# Patient Record
Sex: FEMALE | Race: WHITE | NOT HISPANIC OR LATINO | Employment: OTHER | URBAN - METROPOLITAN AREA
[De-identification: names, ages, dates, MRNs, and addresses within clinical notes are randomized per-mention and may not be internally consistent; named-entity substitution may affect disease eponyms.]

---

## 2017-02-14 ENCOUNTER — HISTORICAL (OUTPATIENT)
Dept: RADIOLOGY | Facility: HOSPITAL | Age: 62
End: 2017-02-14

## 2017-05-04 ENCOUNTER — HISTORICAL (OUTPATIENT)
Dept: HEMATOLOGY/ONCOLOGY | Facility: CLINIC | Age: 62
End: 2017-05-04

## 2017-07-24 ENCOUNTER — HISTORICAL (OUTPATIENT)
Dept: HEMATOLOGY/ONCOLOGY | Facility: CLINIC | Age: 62
End: 2017-07-24

## 2017-10-25 ENCOUNTER — HISTORICAL (OUTPATIENT)
Dept: HEMATOLOGY/ONCOLOGY | Facility: CLINIC | Age: 62
End: 2017-10-25

## 2017-11-03 ENCOUNTER — HISTORICAL (OUTPATIENT)
Dept: RADIOLOGY | Facility: HOSPITAL | Age: 62
End: 2017-11-03

## 2017-11-08 ENCOUNTER — HISTORICAL (OUTPATIENT)
Dept: LAB | Facility: HOSPITAL | Age: 62
End: 2017-11-08

## 2017-12-13 ENCOUNTER — HISTORICAL (OUTPATIENT)
Dept: ADMINISTRATIVE | Facility: HOSPITAL | Age: 62
End: 2017-12-13

## 2018-02-23 ENCOUNTER — HISTORICAL (OUTPATIENT)
Dept: HEMATOLOGY/ONCOLOGY | Facility: CLINIC | Age: 63
End: 2018-02-23

## 2018-05-25 ENCOUNTER — HISTORICAL (OUTPATIENT)
Dept: HEMATOLOGY/ONCOLOGY | Facility: CLINIC | Age: 63
End: 2018-05-25

## 2018-05-29 ENCOUNTER — HISTORICAL (OUTPATIENT)
Dept: HEMATOLOGY/ONCOLOGY | Facility: CLINIC | Age: 63
End: 2018-05-29

## 2018-06-05 ENCOUNTER — HISTORICAL (OUTPATIENT)
Dept: CARDIOLOGY | Facility: HOSPITAL | Age: 63
End: 2018-06-05

## 2018-06-05 ENCOUNTER — TELEPHONE (OUTPATIENT)
Dept: TRANSPLANT | Facility: CLINIC | Age: 63
End: 2018-06-05

## 2018-06-05 NOTE — TELEPHONE ENCOUNTER
----- Message from Greyson Hale sent at 6/5/2018  8:57 AM CDT -----  Rec'brian recs on pt, but there were no pt demos. Called ref MD office, Dr. DOMONIQUE Schneider @ [phone] and was transferred to HIM were I LVM for them to send pt demo sheet.

## 2018-06-11 ENCOUNTER — TELEPHONE (OUTPATIENT)
Dept: TRANSPLANT | Facility: CLINIC | Age: 63
End: 2018-06-11

## 2018-06-11 NOTE — TELEPHONE ENCOUNTER
----- Message from Sharron Trivedi sent at 6/11/2018 10:16 AM CDT -----  Contact: Dr. DOMONIQUE Schneider Office  Needs Advice    Reason for call:   Status check on Referral  Communication Preference: [phone]  Additional Information: n/a

## 2018-06-12 ENCOUNTER — TELEPHONE (OUTPATIENT)
Dept: TRANSPLANT | Facility: CLINIC | Age: 63
End: 2018-06-12

## 2018-06-12 ENCOUNTER — HISTORICAL (OUTPATIENT)
Dept: CARDIOLOGY | Facility: HOSPITAL | Age: 63
End: 2018-06-12

## 2018-06-12 NOTE — TELEPHONE ENCOUNTER
Pt called re referral rec. elhamm to call back. Call to mert Wakefield to return call. Dr Lujan placed a call to MERT Grover on his phone per DR Lujan. PT chart reviewed, Per Dr Lujan she does not meet criteria for liver transplant.

## 2018-06-12 NOTE — TELEPHONE ENCOUNTER
Initial referral received from Dr Heriberto Schneider    Patient with multifocal HCC   Referred for  SURGICAL CONSULT   Referral completed and forwarded to [facility].      Insurance: [employer]  Contact # [phone]  Policy [id number]  [facility] # [id number]

## 2018-06-12 NOTE — TELEPHONE ENCOUNTER
Call placed to AMELIA Sneed again regarding pt referral. Requested a call back.  Also called pt home twice, lvm on phone number listed.

## 2018-06-12 NOTE — TELEPHONE ENCOUNTER
Called spoke to the , agreed to appt on 6/20 but maybe sooner if wife would want.  She is currently being Mapped for a Tace procedure. He will call back. AMELIA Ramos requested the CD's of all imaging and the  will pick it up. As of now, the appt is made for 6/20 .

## 2018-06-12 NOTE — TELEPHONE ENCOUNTER
Placed call to patient no answer, left voice message.  Patient is scheduled to see Dr. Perkins on 06/20/18 at 2:00 pm.

## 2018-06-19 ENCOUNTER — TELEPHONE (OUTPATIENT)
Dept: TRANSPLANT | Facility: CLINIC | Age: 63
End: 2018-06-19

## 2018-06-19 ENCOUNTER — HISTORICAL (OUTPATIENT)
Dept: CARDIOLOGY | Facility: HOSPITAL | Age: 63
End: 2018-06-19

## 2018-06-19 RX ORDER — MULTIVITAMIN
1 TABLET ORAL DAILY
COMMUNITY

## 2018-06-19 RX ORDER — BIOTIN 1 MG
5000 TABLET ORAL DAILY
COMMUNITY

## 2018-06-19 RX ORDER — AMOXICILLIN 500 MG
2 CAPSULE ORAL DAILY
COMMUNITY
End: 2023-03-01

## 2018-06-19 NOTE — TELEPHONE ENCOUNTER
Pt called to confirm her appt for tomorrow, lvm on mobile [phone] phone. Call place to patients home number [phone] LV. Call placed to phone numer [phone] LV with Lev Lewis.      Called to speak with Lorenzo CISNEROS with DR Schneider's office. LVM to call if she has any updates.

## 2018-06-20 ENCOUNTER — EVALUATION (OUTPATIENT)
Dept: TRANSPLANT | Facility: CLINIC | Age: 63
End: 2018-06-20
Payer: COMMERCIAL

## 2018-06-20 ENCOUNTER — TELEPHONE (OUTPATIENT)
Dept: TRANSPLANT | Facility: CLINIC | Age: 63
End: 2018-06-20

## 2018-06-20 VITALS
DIASTOLIC BLOOD PRESSURE: 78 MMHG | HEIGHT: 66 IN | HEART RATE: 75 BPM | OXYGEN SATURATION: 96 % | TEMPERATURE: 99 F | SYSTOLIC BLOOD PRESSURE: 108 MMHG | WEIGHT: 143.06 LBS | BODY MASS INDEX: 22.99 KG/M2 | RESPIRATION RATE: 18 BRPM

## 2018-06-20 DIAGNOSIS — C22.0 HCC (HEPATOCELLULAR CARCINOMA): ICD-10-CM

## 2018-06-20 PROCEDURE — 3008F BODY MASS INDEX DOCD: CPT | Mod: CPTII,S$GLB,, | Performed by: TRANSPLANT SURGERY

## 2018-06-20 PROCEDURE — 99999 PR PBB SHADOW E&M-EST. PATIENT-LVL III: CPT | Mod: PBBFAC,TXP,,

## 2018-06-20 PROCEDURE — 99204 OFFICE O/P NEW MOD 45 MIN: CPT | Mod: S$GLB,,, | Performed by: TRANSPLANT SURGERY

## 2018-06-20 NOTE — TELEPHONE ENCOUNTER
Patient: Maci Lewis       MRN: [id number]      : 1955     Age: 62 y.o.  [address] [place]  [place] [address]    Provider: Gigi Perkins MD    Urgency of review: urgent    Patient Transplant Status: Not a candidate    Reason for presentation: Non-Transplant    Clinical Summary: 62 F with recurrent, multifocal HCC. No history of liver disease, prior breast cancer in 2012 treated, no recurrence. Initially presented in 2016 with tumor rupture and bleed, non-anatomic liver resection. She was disease free for over a year then developed a recurrent at the resection margin requiring a second, apparent non-anatomic liver resection at that time. On her second post op imaging, she had 4 tumors, biopsy proven HCC. No evidence of extrahepatic disease, macrovascular invasion or significant lymphadenopathy. Got first treatment of Y90 in [place] of right lobe.    Imaging to be reviewed: CT    HCC Treatment History: Resection x 2 and Y90    ABO:     Platelets: No results found for: PLT, EXTPLATELETS  Creatinine: No results found for: CREATININE, EXTCREATININ  Bilirubin: No results found for: BILITOT, EXTBILITOTAL, EXTBILIRUBIN  AFP Last 3 each if available: No results found for: AFP, EXTAFP    MELD: Computed MELD-Na score unavailable. Necessary lab results were not found in the last year.  Computed MELD score unavailable. Necessary lab results were not found in the last year.    Plan:     Follow-up Provider:

## 2018-06-20 NOTE — PROGRESS NOTES
Subjective:       Patient ID: Maci Lewis is a 62 y.o. female.    Chief Complaint: Hepatic Cancer (HCC)    62 F referred for consultation regarding multi-focal recurrent HCC. She initally presented in 2016 with acute intra-abdominal bleed from peripheral liver mass that required non-anatomic liver resection. Pathology confirmed moderately differentiated HCC by report. She recovered well and was disease free for over a year, but then developed a recurrence at the resection margin treated with a second liver resection. Based on review of imaging this appears to be a non-anatomic resection (op report not available). On her second follow up imaging she unfortunately had multifocal disease with three masses in the right lobe and a fourth mass in the left lobe. The dominant lesion was biopsy proven HCC. She recently had Y90 radioembolization of the right lobe. Most recent imaging is negative for extrahepatic disease, macrovascular invasion or extra-hepatic disease. She otherwise feels well and is very healthy. Her past medical history is significant for breast cancer in 2013 treated with bilateral mastectomy and adjuvant chemotherapy. She has remained disease free for nearly 5 years with respect to her breast cancer.       Review of Systems   Constitutional: Negative for activity change and chills.   HENT: Negative for congestion and sore throat.    Eyes: Negative for discharge and redness.   Respiratory: Negative for cough and shortness of breath.    Cardiovascular: Negative for chest pain and palpitations.   Gastrointestinal: Negative for nausea and vomiting.   Endocrine: Negative for polydipsia and polyuria.   Genitourinary: Negative for dysuria and frequency.   Musculoskeletal: Negative for arthralgias and gait problem.   Skin: Negative for pallor and rash.   Neurological: Negative for dizziness and light-headedness.   Hematological: Negative for adenopathy. Does not bruise/bleed easily.   Psychiatric/Behavioral:  Negative for agitation and suicidal ideas.       Objective:      Physical Exam   Constitutional: She is oriented to person, place, and time. No distress.   Neck: No JVD present.   Cardiovascular: Normal rate and regular rhythm.    Pulmonary/Chest: Effort normal. No respiratory distress. She has no wheezes.   Abdominal: Soft.   Neurological: She is alert and oriented to person, place, and time.   Skin: Skin is warm and dry. She is not diaphoretic.       Assessment:       1. HCC (hepatocellular carcinoma)        Plan:       Ms Lewis presents with multifocal recurrent HCC. We had an extensive discussion about treatment options including the potential role for liver transplantation. Based on her current disease burden, she is outside of [place] and [facility] staging criteria and is not a candidate for liver transplantation. The priority of treatment should be locoregional management with the intent to potentially downstage her tumor burden to within [place] criteria. I discussed that based on the multi-focal, bilobar distribution sustainable downstaging would be unlikely. However, there are cases from our center that have had significant response to Y90 treatment. We also discussed the possibility of living donor liver transplantation which may be a potential option in the future if we were to achieve downstaging.    I plan to present her case at tumor board and review her imaging to discuss additional treatment options. I addressed all of her questions.    Gigi Perkins MD  Liver Transplantation and Hepatobiliary Surgery

## 2018-07-06 ENCOUNTER — TELEPHONE (OUTPATIENT)
Dept: TRANSPLANT | Facility: CLINIC | Age: 63
End: 2018-07-06

## 2018-07-06 NOTE — TELEPHONE ENCOUNTER
Spoke with patient about the recommendations for Y90 treatment.  Patient reports that she has been treated about 2 weeks ago in [place] with Y90..  Patient given information about IR conference.  Patient requested that Dr Perkins give her a call.

## 2018-07-16 ENCOUNTER — TELEPHONE (OUTPATIENT)
Dept: TRANSPLANT | Facility: CLINIC | Age: 63
End: 2018-07-16

## 2018-07-18 ENCOUNTER — TELEPHONE (OUTPATIENT)
Dept: TRANSPLANT | Facility: CLINIC | Age: 63
End: 2018-07-18

## 2018-07-18 NOTE — TELEPHONE ENCOUNTER
----- Message from Jaleesa Gordon RN sent at 7/18/2018  8:30 AM CDT -----  Contact: Elinor  Allison called to say that patient referred for transplant and that patient needs treatment and she has patient's records from 06/20/18 but she needs further records.  Contact # [phone], FAX # [phone].  Please advise.

## 2018-07-18 NOTE — TELEPHONE ENCOUNTER
Call placed to [facility] , attempted to speak with Elinor, message left on nurse answering machine to see what is needed.

## 2018-07-19 ENCOUNTER — TELEPHONE (OUTPATIENT)
Dept: TRANSPLANT | Facility: CLINIC | Age: 63
End: 2018-07-19

## 2018-07-19 NOTE — TELEPHONE ENCOUNTER
"Received call from Elinor Pineda, stating she has been trying for a week to get information regarding "systemic"  treatmenton on this patient. She is asking if the pt has a return clinic visit with Dr Perkins. I informed her no. I will question need for return to clinic at [facility] with hepatology or Dr Perkins. She stated the pt informed her someone called the pt and spoke about NEXAVAR.  I do not see any notes related to that conversation.   Call back number [phone].   "

## 2018-08-01 ENCOUNTER — HISTORICAL (OUTPATIENT)
Dept: CARDIOLOGY | Facility: HOSPITAL | Age: 63
End: 2018-08-01

## 2018-08-14 ENCOUNTER — HISTORICAL (OUTPATIENT)
Dept: CARDIOLOGY | Facility: HOSPITAL | Age: 63
End: 2018-08-14

## 2018-08-22 ENCOUNTER — HISTORICAL (OUTPATIENT)
Dept: HEMATOLOGY/ONCOLOGY | Facility: CLINIC | Age: 63
End: 2018-08-22

## 2018-08-30 ENCOUNTER — HISTORICAL (OUTPATIENT)
Dept: HEMATOLOGY/ONCOLOGY | Facility: CLINIC | Age: 63
End: 2018-08-30

## 2018-09-11 ENCOUNTER — HISTORICAL (OUTPATIENT)
Dept: INFUSION THERAPY | Facility: HOSPITAL | Age: 63
End: 2018-09-11

## 2018-10-01 ENCOUNTER — HISTORICAL (OUTPATIENT)
Dept: HEMATOLOGY/ONCOLOGY | Facility: CLINIC | Age: 63
End: 2018-10-01

## 2018-10-08 ENCOUNTER — TELEPHONE (OUTPATIENT)
Dept: HEMATOLOGY/ONCOLOGY | Facility: CLINIC | Age: 63
End: 2018-10-08

## 2018-10-08 NOTE — TELEPHONE ENCOUNTER
----- Message from Marielos Dawn sent at 10/8/2018  3:36 PM CDT -----  Contact: Dr. Wyatt Schneider called to speak with Dr Owens about Pt   Callback#[phone]  Thank You  KENNY Dawn

## 2018-10-10 ENCOUNTER — TELEPHONE (OUTPATIENT)
Dept: HEMATOLOGY/ONCOLOGY | Facility: CLINIC | Age: 63
End: 2018-10-10

## 2018-10-18 ENCOUNTER — INITIAL CONSULT (OUTPATIENT)
Dept: HEMATOLOGY/ONCOLOGY | Facility: CLINIC | Age: 63
End: 2018-10-18
Payer: COMMERCIAL

## 2018-10-18 VITALS
TEMPERATURE: 97 F | BODY MASS INDEX: 22.82 KG/M2 | HEIGHT: 66 IN | DIASTOLIC BLOOD PRESSURE: 102 MMHG | SYSTOLIC BLOOD PRESSURE: 131 MMHG | HEART RATE: 68 BPM | OXYGEN SATURATION: 97 % | RESPIRATION RATE: 16 BRPM | WEIGHT: 142 LBS

## 2018-10-18 DIAGNOSIS — Z15.01 BRCA2 GENE MUTATION POSITIVE: ICD-10-CM

## 2018-10-18 DIAGNOSIS — Z15.09 BRCA2 GENE MUTATION POSITIVE: ICD-10-CM

## 2018-10-18 DIAGNOSIS — Z85.3 HISTORY OF BREAST CANCER: ICD-10-CM

## 2018-10-18 DIAGNOSIS — C22.0 HCC (HEPATOCELLULAR CARCINOMA): Primary | ICD-10-CM

## 2018-10-18 PROCEDURE — 3008F BODY MASS INDEX DOCD: CPT | Mod: CPTII,S$GLB,, | Performed by: INTERNAL MEDICINE

## 2018-10-18 PROCEDURE — 99999 PR PBB SHADOW E&M-EST. PATIENT-LVL IV: CPT | Mod: PBBFAC,,, | Performed by: INTERNAL MEDICINE

## 2018-10-18 PROCEDURE — 99205 OFFICE O/P NEW HI 60 MIN: CPT | Mod: S$GLB,,, | Performed by: INTERNAL MEDICINE

## 2018-10-18 RX ORDER — PANTOPRAZOLE SODIUM 40 MG/1
TABLET, DELAYED RELEASE ORAL
COMMUNITY
Start: 2018-10-08 | End: 2022-07-11 | Stop reason: ALTCHOICE

## 2018-10-18 RX ORDER — ONDANSETRON 4 MG/1
TABLET, ORALLY DISINTEGRATING ORAL
Refills: 4 | COMMUNITY
Start: 2018-07-25 | End: 2022-07-11 | Stop reason: ALTCHOICE

## 2018-10-18 RX ORDER — NAPROXEN 250 MG/1
TABLET ORAL
Refills: 0 | COMMUNITY
Start: 2018-08-09 | End: 2022-07-11 | Stop reason: ALTCHOICE

## 2018-10-18 RX ORDER — CALCIUM CARBONATE 600 MG
TABLET ORAL
COMMUNITY
Start: 2014-01-09

## 2018-10-18 NOTE — PATIENT INSTRUCTIONS
We will request your scans from [facility].         Nivolumab injection  What is this medicine?  NIVOLUMAB (ras VOL ue mab) is a monoclonal antibody. It is used to treat melanoma, lung cancer, kidney cancer, and Hodgkin lymphoma.  How should I use this medicine?  This medicine is for infusion into a vein. It is given by a health care professional in a hospital or clinic setting.  A special MedGuide will be given to you before each treatment. Be sure to read this information carefully each time.  Talk to your pediatrician regarding the use of this medicine in children. Special care may be needed.  What side effects may I notice from receiving this medicine?  Side effects that you should report to your doctor or health care professional as soon as possible:  · allergic reactions like skin rash, itching or hives, swelling of the face, lips, or tongue  · black, tarry stools  · blood in the urine  · bloody or watery diarrhea  · changes in vision  · change in sex drive  · changes in emotions or moods  · chest pain  · confusion  · cough  · decreased appetite  · diarrhea  · facial flushing  · feeling faint or lightheaded  · fever, chills  · hair loss  · hallucination, loss of contact with reality  · headache  · irritable  · joint pain  · loss of memory  · muscle pain  · muscle weakness  · seizures  · shortness of breath  · signs and symptoms of high blood sugar such as dizziness; dry mouth; dry skin; fruity breath; nausea; stomach pain; increased hunger or thirst; increased urination  · signs and symptoms of kidney injury like trouble passing urine or change in the amount of urine  · signs and symptoms of liver injury like dark yellow or brown urine; general ill feeling or flu-like symptoms; light-colored stools; loss of appetite; nausea; right upper belly pain; unusually weak or tired; yellowing of the eyes or skin  · stiff neck  · swelling of the ankles, feet, hands  · weight gain  Side effects that usually do not require  medical attention (report to your doctor or health care professional if they continue or are bothersome):  · bone pain  · constipation  · tiredness  · vomiting  What may interact with this medicine?  Interactions have not been studied.  Give your health care provider a list of all the medicines, herbs, non-prescription drugs, or dietary supplements you use. Also tell them if you smoke, drink alcohol, or use illegal drugs. Some items may interact with your medicine.  What if I miss a dose?  It is important not to miss your dose. Call your doctor or health care professional if you are unable to keep an appointment.  Where should I keep my medicine?  This drug is given in a hospital or clinic and will not be stored at home.  What should I tell my health care provider before I take this medicine?  They need to know if you have any of these conditions:  · diabetes  · immune system problems  · kidney disease  · liver disease  · lung disease  · organ transplant  · stomach or intestine problems  · thyroid disease  · an unusual or allergic reaction to nivolumab, other medicines, foods, dyes, or preservatives  · pregnant or trying to get pregnant  · breast-feeding  What should I watch for while using this medicine?  This drug may make you feel generally unwell. Continue your course of treatment even though you feel ill unless your doctor tells you to stop.  You may need blood work done while you are taking this medicine.  Do not become pregnant while taking this medicine or for 5 months after stopping it. Women should inform their doctor if they wish to become pregnant or think they might be pregnant. There is a potential for serious side effects to an unborn child. Talk to your health care professional or pharmacist for more information. Do not breast-feed an infant while taking this medicine.  NOTE:This sheet is a summary. It may not cover all possible information. If you have questions about this medicine, talk to your  doctor, pharmacist, or health care provider. Copyright© 2017 Gold Standard        Regorafenib tablets  What is this medicine?  REGORAFENIB (RE carlie STEPHEN e nib) is a medicine that targets proteins in cancer cells and stops the cancer cells from growing. It is used to treat colorectal cancer and gastrointestinal stromal tumors (GIST).  How should I use this medicine?  Take this medicine by mouth with a glass of water. Follow the directions on the prescription label. Do not take it more often than directed. Take this medicine with food. Do not take with grapefruit juice. Do not stop taking except on your doctor's advice.  Talk to your pediatrician regarding the use of this medicine in children. Special care may be needed.  What side effects may I notice from receiving this medicine?  Side effects that you should report to your doctor or health care professional as soon as possible:  · allergic reactions like skin rash, itching or hives, swelling of the face, lips, or tongue  · bloody or black, tarry stools  · breathing problems  · changes in vision  · chest pain or chest tightness  · confusion  · dizziness  · feeling faint or lightheaded  · high fever  · light-colored stools  · nausea, vomiting  · red or dark-brown urine  · red spots on the skin  · right upper belly pain  · seizures  · severe headache  · sores on the hands or feet  · spitting up blood or brown material that looks like coffee grounds  · stomach pain  · unusual bruising or bleeding from the eye, gums, or nose  · unusually weak or tired  · yellowing of the eyes or skin  Side effects that usually do not require medical attention (Report these to your doctor or health care professional if they continue or are bothersome.):  · diarrhea  · hoarseness  · loss of appetite  · sore throat  · tiredness  · weight loss  What may interact with this medicine?  This medicine may interact with the  following:  · carbamazepine  · irinotecan  · itraconazole  · ketoconazole  · phenobarbital  · phenytoin  · posaconazole  · rifampin  · Krysten's Wort  · telithromycin  · voriconazole  · warfarin  What if I miss a dose?  If you miss a dose, take it as soon as you can. If it is almost time for your next dose, take only that dose. Do not take double or extra doses.  Where should I keep my medicine?  Keep out of the reach of children.  Store between 20 and 25 degrees C (68 and 77 degrees F). Keep this medicine in the original container. Throw away any unused medicine after the expiration date.  What should I tell my health care provider before I take this medicine?  They need to know if you have any of these conditions:  · bleeding disorders  · heart disease  · high blood pressure  · liver disease  · recent surgery  · an unusual or allergic reaction to regorafenib, other medicines, foods, dyes, or preservatives  · pregnant or trying to get pregnant  · breast-feeding  What should I watch for while using this medicine?  This drug may make you feel generally unwell. This is not uncommon, as chemotherapy can affect healthy cells as well as cancer cells. Report any side effects. Continue your course of treatment even though you feel ill unless your doctor tells you to stop.  You may need blood work done while you are taking this medicine.  Do not become pregnant while taking this medicine or for 2 months after stopping it. Women should inform their doctor if they wish to become pregnant or think they might be pregnant. Men should not father a child while taking this medicine and for 2 months after stopping it. There is a potential for serious side effects to an unborn child. Talk to your health care professional or pharmacist for more information. Do not breast-feed an infant while taking this medicine.  If you are going to have surgery or any other procedures, tell your doctor you are taking this medicine.  Talk to your  doctor about your risk of cancer. You may be more at risk for certain types of cancers if you take this medicine.  NOTE:This sheet is a summary. It may not cover all possible information. If you have questions about this medicine, talk to your doctor, pharmacist, or health care provider. Copyright© 2017 Gold Standard        Lenvatinib oral capsule  What is this medicine?  LENVATINIB (saravanan VA ti nib) is a medicine that targets proteins in cancer cells and stops the cancer cells from growing. It is used to treat thyroid cancer and kidney cancer.  How should I use this medicine?  Take this medicine by mouth with a glass of water. Follow the directions on the prescription label. Swallow the capsules whole. Do not cut, crush or chew this medicine. Take your medicine at regular intervals. Do not take it more often than directed. Do not stop taking except on your doctor's advice.  Talk to your pediatrician regarding the use of this medicine in children. Special care may be needed.  What side effects may I notice from receiving this medicine?  Side effects that you should report to your doctor or health care professional as soon as possible:  · allergic reactions like skin rash, itching or hives, swelling of the face, lips, or tongue  · breathing problems  · chest pain or palpitations  · dizziness  · feeling faint or lightheaded, falls  · headache  · high blood pressure  · seizures  · signs and symptoms of bleeding such as bloody or black, tarry stools; red or dark-brown urine; spitting up blood or brown material that looks like coffee grounds; red spots on the skin; unusual bruising or bleeding from the eye, gums, or nose  · signs and symptoms of a dangerous change in heartbeat or heart rhythm like chest pain; dizziness; fast or irregular heartbeat; palpitations; feeling faint or lightheaded, falls; breathing problems  · signs and symptoms of kidney injury like trouble passing urine or change in the amount of urine  · signs  and symptoms of liver injury like dark yellow or brown urine; general ill feeling or flu-like symptoms; light-colored stools; loss of appetite; nausea; right upper belly pain; unusually weak or tired; yellowing of the eyes or skin  · signs and symptoms of low potassium like muscle cramps or muscle pain; chest pain; dizziness; feeling faint or lightheaded, falls; palpitations; breathing problems; or fast, irregular heartbeat  · signs and symptoms of a stroke like changes in vision; confusion; trouble speaking or understanding; severe headaches; sudden numbness or weakness of the face, arm or leg; trouble walking; dizziness; loss of balance or coordination  · stomach pain  · swelling of the legs or ankles  · unusually weak or tired  Side effects that usually do not require medical attention (Report these to your doctor or health care professional if they continue or are bothersome.):  · diarrhea  · joint pain  · loss of appetite  · mouth sores  · muscle pain  · nausea, vomiting  · weight loss  What may interact with this medicine?  Do not take this medicine with any of the following medications:  · cisapride  · dofetilide  · dronedarone  · pimozide  · thioridazine  · ziprasidone  This medicine may also interact with the following medications:  · alfuzosin  · bedaquiline  · certain antibiotics like azithromycin, clarithromycin or erythromycin  · certain medicines for bladder problems like solifenacin, tolterodine  · certain medicines for depression, anxiety, or psychotic disturbances  · certain medicines for fungal infections like ketoconazole, posaconazole, voriconazole, fluconazole, and itraconazole  · certain medicines for irregular heart beat like amiodarone, dofetilide, flecainide, propafenone, quinidine  · chloroquine  · ciprofloxacin  · cyclobenzaprine  · ezogabine  · fingolimod  · granisetron  · leuprolide  · ritonavir  · lopinavir;  ritonavir  · methadone  · metronidazole  · mifepristone  · octreotide  · ondansetron  · other medicines that prolong the QT interval (cause an abnormal heart rhythm)  · pasireotide  · pentamidine  · promethazine  · quinine  · ranolazine  · rifampin  · rilpivirine  · romidepsin  · saquinavir  · tacrolimus  · telavancin  · telithromycin  · tetrabenazine  · tizanidine  · toremifene  · vardenafil  · vorinostat  What if I miss a dose?  If you miss a dose, take it as soon as you can. If your next dose is to be taken in less than 12 hours, then do not take the missed dose. Take the next dose at your regular time. Do not take double or extra doses.  Where should I keep my medicine?  Keep out of the reach of children.  Store between 20 and 25 degrees C (68 and 77 degrees F). Throw away any unused medicine after the expiration date.  What should I tell my health care provider before I take this medicine?  They need to know if you have any of these conditions:  · diabetes  · high blood pressure  · heart disease  · history of blood clots  · history of a drug or alcohol abuse problem  · history of irregular heartbeat  · history of low levels of calcium, magnesium, or potassium in the blood  · inflammatory bowel disease  · kidney disease  · liver disease  · protein in your urine  · an unusual or allergic reaction to lenvatinib, other medicines, foods, dyes, or preservatives  · pregnant or trying to get pregnant  · breast-feeding  What should I watch for while using this medicine?  Visit your doctor for regular check ups. Report any side effects. Continue your course of treatment unless your doctor tells you to stop. You will need blood work done while you are taking this medicine.  Do not become pregnant while taking this medicine or for 2 weeks after stopping it. Women should inform their doctor if they wish to become pregnant or think they might be pregnant. There is a potential for serious side effects to an unborn child. Talk  to your health care professional or pharmacist for more information. Do not breast-feed an infant while taking this medicine.  Be careful brushing and flossing your teeth or using a toothpick because you may get an infection or bleed more easily. If you have any dental work done, tell your dentist you are receiving this medicine.  This medicine may increase your risk to bruise or bleed. Call your doctor or health care professional if you notice any unusual bleeding.  This drug may make you feel generally unwell. This is not uncommon, as chemotherapy can affect healthy cells as well as cancer cells. Report any side effects. Continue your course of treatment even though you feel ill unless your doctor tells you to stop.  This medicine may interfere with the ability to have a child. You should talk with your doctor or health care professional if you are concerned about your fertility.  NOTE:This sheet is a summary. It may not cover all possible information. If you have questions about this medicine, talk to your doctor, pharmacist, or health care provider. Copyright© 2017 Gold Standard

## 2018-10-18 NOTE — PROGRESS NOTES
PATIENT: Maci Lewis  MRN: [id number]  DATE: 10/18/2018      Diagnosis:   1. HCC (hepatocellular carcinoma)    2. BRCA2 gene mutation positive    3. History of breast cancer        Chief Complaint: HCC and research patient      Oncologic History:      Oncologic History Hepatocellular carcinoma diagnosed 07/2016    Oncologic Treatment Partial right hepatectomy 07/2016  Y90 6/2018, 8/2018  Nexavar 8/2018 (discontinued following 2 weeks due to adverse reactions)    Pathology 07/2016:  Intermediate grade hepatocellular carcinoma          Subjective:    Interval History: Ms. Lewis is a 62 y.o. female who is seen as an initial visit for hepatocellular carcinoma.  She has a history of left breast cancer which was diagnosed in 10/2013 with final pathology showing a 2.5 cm, grade 3 infiltrating ductal carcinoma.  Lymph nodes were negative for involvement and she was [place], [place], HER2 negative.  She underwent adjuvant chemotherapy with Adriamycin/Cytoxan followed by Taxol and radiation.  She completed all adjuvant therapy in 06/2014.  She did test positive for a BRCA2 mutation and then underwent bilateral mastectomies followed by prophylactic bilateral salpingo oophorectomy in 06/2015.  She was followed with screening abdominal ultrasounds of the pancreas, liver and bile ducts which were negative.  In 07/2016 she sought medical attention because of abdominal pain and a CT of the abdomen was performed showing 2 complex heterogeneous masses in the right hepatic lobe.  One of the masses showed evidence of active hemorrhage and there was a large subcapsular hematoma with free fluid in the pelvis.  She was taken to the OR for exploratory laparotomy and was found to have tumor in segment 6 with necrosis and rupture but no active bleeding. A partial right hepatectomy was performed.  An intraoperative ultrasound did not show evidence other lesions. Her final pathology had shown a 5.3 cm intermediate grade hepatocellular carcinoma  with clear margins.  Subsequent scans did not show any residual disease and she was placed on surveillance.  A CT in 07/2017 had shown a new area of hypoattenuation along the suture line possibly suggesting recurrence and short-term follow-up was recommended.  The follow-up CT in 10/2017 showed a significant increase in this lesion now measuring 4.7 x 4.2.  She was referred to Surgical Oncology and underwent resection of segment 6 in 11/2017 with pathology showing a moderately differentiated hepatocellular carcinoma measuring 6.7 cm with clear margins.  Follow-up CT scan in 05/2018 had shown progression within the liver in segment 5 measuring 6.7 cm and a new mass in the right lobe measuring 3.7 cm and also in segment 8 measuring 1.6 cm.  She underwent a CT-guided biopsy in 06/2018 with pathology confirming hepatocellular carcinoma, well-differentiated.  She was evaluated for liver transplantation in 06/2018 but was felt to not be a candidate due to her multifocal disease and radioembolization was recommended.  She underwent her 1st Y 90 radioembolization to the right hepatic artery in 06/2018.  She subsequently underwent a 2nd Y 90 radioembolization to the left hepatic artery in 08/2018.  At around the same time she was started on treatment with Nexavar but developed a severe rash and itching to her face and scalp along with worsening transaminitis and anemia and this was discontinued following 2 weeks.  Repeat imaging in 10/2018 had shown that the dominant lesion in hepatic segment 5 had decreased slightly to 5.8 x 5.4 cm however there was disease progression elsewhere with a dominant left hepatic lobe mass measuring 10.1 x 6.9 cm which had more than doubled in size.  Other lesions were also larger.    She is referred here today for consideration of a clinical trial.  Other treatment options had previously been discussed with her including reduced dosed Nexavar, nivolumab, Stivarga.  She states that she generally  feels well.  She does have occasional right-sided abdominal pain and indigestion.  She is otherwise fully active and has traveled recently.  Her weight has been stable.  She does desire further treatment if it will prolong her quality of life.  She has no other new complaints.      Past Medical History:   Past Medical History:   Diagnosis Date    Arthritis     BRCA2 gene mutation positive 10/18/2018    Breast cancer     Cancer     History of breast cancer 10/18/2018       Past Surgical HIstory:   Past Surgical History:   Procedure Laterality Date    BREAST LUMPECTOMY      BREAST RECONSTRUCTION      Reconstruction twice    HEEL SPUR SURGERY      LIVER RESECTION      2016 and 2017     MASTECTOMY      double 2014    OOPHORECTOMY      TONSILLECTOMY  1959       Family History:   Family History   Problem Relation Age of Onset    Heart disease Father     Cancer Father         RCC    Cancer Paternal Uncle         RCC    Cancer Paternal Grandmother         RCC       Social History:  reports that  has never smoked. She does not have any smokeless tobacco history on file. She reports that she drinks alcohol. She reports that she does not use drugs.    Allergies:  Review of patient's allergies indicates:  No Known Allergies    Medications:  Current Outpatient Medications   Medication Sig Dispense Refill    calcium carbonate (OS-MENDEZ) 600 mg calcium (1,500 mg) Tab Take by mouth.      omega-3 fatty acids fish oil (OMEGA-3 2100) 1,050-1,200 mg Cap capsule Take by mouth.      pantoprazole (PROTONIX) 40 MG tablet Take by mouth.      pedi multivit no.12 w-fluoride (MULTIVITAMINS-FLUORIDE-FOLIC A ORAL) Take by mouth.      ranitidine (ZANTAC) 150 MG tablet Take 150 mg by mouth.      biotin 1 mg tablet Take 5,000 mcg by mouth once daily.       calcium-vitamin D (OSCAL) 250 (625)-125 mg-unit per tablet Take 1 tablet by mouth once daily.      fish oil-omega-3 fatty acids 300-1,000 mg capsule Take 2 capsules by mouth  "once daily.       multivitamin (THERAGRAN) per tablet Take 1 tablet by mouth once daily.      naproxen (NAPROSYN) 250 MG tablet TAKE 1 TO 2 TABLETS EVERY 12 HOURS AS NEEDED FOR FEVER  0    ondansetron (ZOFRAN-ODT) 4 MG TbDL DISSOLVE ONE TABLET ON TONGUE EVERY 8 HOURS AS NEEDED FOR NAUSEA  4     No current facility-administered medications for this visit.        Review of Systems   Constitutional: Negative for chills, fever and unexpected weight change.   HENT: Negative for congestion, hearing loss and nosebleeds.    Eyes: Negative for visual disturbance.   Respiratory: Negative for cough and shortness of breath.    Cardiovascular: Negative for chest pain and palpitations.   Gastrointestinal: Positive for abdominal pain. Negative for blood in stool, constipation, diarrhea, nausea and vomiting.   Genitourinary: Negative for dysuria.   Musculoskeletal: Negative for back pain and gait problem.   Skin: Negative for color change and rash.   Neurological: Negative for dizziness, weakness and headaches.   Hematological: Negative for adenopathy. Does not bruise/bleed easily.   Psychiatric/Behavioral: Negative for confusion.       ECOG Performance Status:   ECOG SCORE    0 - Fully active-able to carry on all pre-disease performance without restriction         Objective:      Vitals:   Vitals:    10/18/18 1021   BP: (!) 131/102   Pulse: 68   Resp: 16   Temp: 97.2 °F (36.2 °C)   SpO2: 97%   Weight: 64.4 kg (141 lb 15.6 oz)   Height: 5' 6" (1.676 m)     BMI: Body mass index is 22.92 kg/m².    Physical Exam   Constitutional: She is oriented to person, place, and time. She appears well-developed and well-nourished. No distress.   HENT:   Head: Normocephalic.   Mouth/Throat: No oropharyngeal exudate.   Eyes: EOM are normal. No scleral icterus.   Neck: Neck supple. No tracheal deviation present. No thyromegaly present.   Cardiovascular: Normal rate and regular rhythm.   Pulmonary/Chest: Effort normal and breath sounds normal. No " respiratory distress. She has no wheezes. She has no rales.   Abdominal: Soft. She exhibits no distension and no mass. There is no tenderness. There is no rebound and no guarding.   Musculoskeletal: Normal range of motion. She exhibits no edema.   Lymphadenopathy:     She has no cervical adenopathy.   Neurological: She is alert and oriented to person, place, and time. No cranial nerve deficit.   Skin: Skin is warm and dry.   Psychiatric: She has a normal mood and affect.       Laboratory Data:  No visits with results within 1 Week(s) from this visit.   Latest known visit with results is:   No results found for any previous visit.         Imaging:   Outside imaging reports reviewed     Assessment:       1. HCC (hepatocellular carcinoma)    2. BRCA2 gene mutation positive    3. History of breast cancer           Plan:     I have had a long conversation with Mrs. Lewis and her  today concerning her disease.  She has a multifocal hepatocellular carcinoma and has received treatments with radioembolization x2 and also treatment with Nexavar which she did not tolerate.  Her most recent scans show overall rapid progression of disease.  Fortunately, she is minimally symptomatic right now.  We have discussed further standard of care options including rechallenge with Nexavar at a lower dose, Lenvima, nivolumab or regorafenib.  We have also discussed the role for clinical trials and she does have excellent performance status, however, I do not have any treatment trials that she qualifies for at this time.  We do have a clinical trial examining the use next generation sequencing in patients with advanced malignancies which she is interested in and we will plan to send her prior biopsy specimen for testing which may open doors for other clinical trials and treatment.  In the interim, I do think he would probably be best to start her on treatment with nivolumab and have given her written information on this.  She is  going to follow back up with Dr. Schneider for further discussion and initiation of treatment.  I have told her we would notify her of the results of the next generation sequencing and if there are further options for therapy based on this.  All questions were answered and she is agreeable with this plan.      Errol Christopher DO, FACP  Hematology & Oncology  [address] [place] [address]  ph. [phone]  Fax. [phone]    60 minutes were spent in coordination of patient's care, record review and counseling.  More than 50% of the time was face-to-face.

## 2018-10-18 NOTE — LETTER
October 18, 2018      René Mederos MD  [address]  [facility] [address]           [facility]  [address]  [facility] [address]  Phone: [phone]          Patient: Maci Lewis   MR Number: [id number]   YOB: 1955   Date of Visit: 10/18/2018       Dear Dr. René Mederos:    Thank you for referring Maci Lewis to me for evaluation. Attached you will find relevant portions of my assessment and plan of care.    If you have questions, please do not hesitate to call me. I look forward to following Maci Lewis along with you.    Sincerely,    Errol Christopher DO, FACP    Enclosure  CC:  Heriberto Schneider MD    If you would like to receive this communication electronically, please contact [email or web address] or [phone] to request more information on [facility] access.    For providers and/or their staff who would like to refer a patient to [facility], please contact us through our one-stop-shop provider referral line, [facility] , at [phone].    If you feel you have received this communication in error or would no longer like to receive these types of communications, please e-mail [email or web address]

## 2018-10-25 ENCOUNTER — HISTORICAL (OUTPATIENT)
Dept: INFUSION THERAPY | Facility: HOSPITAL | Age: 63
End: 2018-10-25

## 2018-11-07 ENCOUNTER — TELEPHONE (OUTPATIENT)
Dept: HEMATOLOGY/ONCOLOGY | Facility: CLINIC | Age: 63
End: 2018-11-07

## 2018-11-07 NOTE — TELEPHONE ENCOUNTER
----- Message from Tessie Sandhu sent at 11/7/2018  9:35 AM CST -----  Contact: Pt  Shauna,    Pt states she had a test done that was sent off to be analyzed, she would like to know if the results are in?    Contact number [phone] (home) or [phone] (cell)  Thanks

## 2018-11-08 ENCOUNTER — HISTORICAL (OUTPATIENT)
Dept: INFUSION THERAPY | Facility: HOSPITAL | Age: 63
End: 2018-11-08

## 2018-11-21 ENCOUNTER — HISTORICAL (OUTPATIENT)
Dept: HEMATOLOGY/ONCOLOGY | Facility: CLINIC | Age: 63
End: 2018-11-21

## 2018-11-23 ENCOUNTER — HISTORICAL (OUTPATIENT)
Dept: INFUSION THERAPY | Facility: HOSPITAL | Age: 63
End: 2018-11-23

## 2018-12-06 ENCOUNTER — HISTORICAL (OUTPATIENT)
Dept: INFUSION THERAPY | Facility: HOSPITAL | Age: 63
End: 2018-12-06

## 2018-12-21 ENCOUNTER — HISTORICAL (OUTPATIENT)
Dept: INFUSION THERAPY | Facility: HOSPITAL | Age: 63
End: 2018-12-21

## 2019-01-04 ENCOUNTER — HISTORICAL (OUTPATIENT)
Dept: INFUSION THERAPY | Facility: HOSPITAL | Age: 64
End: 2019-01-04

## 2019-01-15 ENCOUNTER — HISTORICAL (OUTPATIENT)
Dept: RADIOLOGY | Facility: HOSPITAL | Age: 64
End: 2019-01-15

## 2019-01-18 ENCOUNTER — HISTORICAL (OUTPATIENT)
Dept: INFUSION THERAPY | Facility: HOSPITAL | Age: 64
End: 2019-01-18

## 2019-01-24 ENCOUNTER — TELEPHONE (OUTPATIENT)
Dept: HEMATOLOGY/ONCOLOGY | Facility: CLINIC | Age: 64
End: 2019-01-24

## 2019-01-24 NOTE — TELEPHONE ENCOUNTER
Spoke with patient's oncologist today.  The patient was placed on nivolumab after I had seen her and had a phenomenal response to therapy.  Some tumors that were 8 cm are now 2 cm and the patient is tolerating treatment very well. He asked about reconsideration for transplant and I have asked him to follow up with Dr. Perkins

## 2019-02-06 ENCOUNTER — HISTORICAL (OUTPATIENT)
Dept: ADMINISTRATIVE | Facility: HOSPITAL | Age: 64
End: 2019-02-06

## 2019-02-06 LAB
CHOLEST SERPL-MCNC: 160 MG/DL (ref 0–200)
CHOLEST/HDLC SERPL: 3 {RATIO} (ref 0–4)
HDLC SERPL-MCNC: 53 MG/DL (ref 35–60)
LDLC SERPL CALC-MCNC: 88 MG/DL (ref 0–129)
TRIGL SERPL-MCNC: 97 MG/DL (ref 30–150)
VLDLC SERPL CALC-MCNC: 19 MG/DL

## 2019-02-08 ENCOUNTER — HISTORICAL (OUTPATIENT)
Dept: HEMATOLOGY/ONCOLOGY | Facility: CLINIC | Age: 64
End: 2019-02-08

## 2019-02-08 LAB
ABS NEUT (OLG): 3.55 X10(3)/MCL (ref 2.1–9.2)
ALBUMIN SERPL-MCNC: 3.7 GM/DL (ref 3.4–5)
ALP SERPL-CCNC: 121 UNIT/L (ref 38–126)
ALT SERPL-CCNC: 42 UNIT/L (ref 12–78)
ANION GAP SERPL CALC-SCNC: 16 MMOL/L
AST SERPL-CCNC: 26 UNIT/L (ref 15–37)
BASOPHILS # BLD AUTO: 0 X10(3)/MCL (ref 0–0.2)
BASOPHILS NFR BLD AUTO: 0.4 %
BILIRUB SERPL-MCNC: 0.7 MG/DL (ref 0.2–1)
BILIRUBIN DIRECT+TOT PNL SERPL-MCNC: 0.2 MG/DL (ref 0–0.5)
BILIRUBIN DIRECT+TOT PNL SERPL-MCNC: 0.5 MG/DL (ref 0–0.8)
BUN SERPL-MCNC: 19 MG/DL (ref 8–26)
CHLORIDE SERPL-SCNC: 102 MMOL/L (ref 98–109)
CREAT SERPL-MCNC: 0.8 MG/DL (ref 0.6–1.3)
EOSINOPHIL # BLD AUTO: 0.2 X10(3)/MCL (ref 0–0.9)
EOSINOPHIL NFR BLD AUTO: 3 %
ERYTHROCYTE [DISTWIDTH] IN BLOOD BY AUTOMATED COUNT: 14.3 % (ref 11.5–17)
GLUCOSE SERPL-MCNC: 94 MG/DL (ref 70–105)
HCT VFR BLD AUTO: 43.7 % (ref 37–47)
HCT VFR BLD CALC: 43 % (ref 38–51)
HGB BLD-MCNC: 14.1 GM/DL (ref 12–16)
HGB BLD-MCNC: 14.6 MG/DL (ref 12–17)
LIVER PROFILE INTERP: NORMAL
LYMPHOCYTES # BLD AUTO: 0.7 X10(3)/MCL (ref 0.6–4.6)
LYMPHOCYTES NFR BLD AUTO: 14.2 %
MCH RBC QN AUTO: 29.4 PG (ref 27–31)
MCHC RBC AUTO-ENTMCNC: 32.3 GM/DL (ref 33–36)
MCV RBC AUTO: 91 FL (ref 80–94)
MONOCYTES # BLD AUTO: 0.6 X10(3)/MCL (ref 0.1–1.3)
MONOCYTES NFR BLD AUTO: 11 %
NEUTROPHILS # BLD AUTO: 3.6 X10(3)/MCL (ref 2.1–9.2)
NEUTROPHILS NFR BLD AUTO: 71.2 %
PLATELET # BLD AUTO: 158 X10(3)/MCL (ref 130–400)
PMV BLD AUTO: 8.6 FL (ref 9.4–12.4)
POC IONIZED CALCIUM: 1.16 MMOL/L (ref 1.12–1.32)
POC TCO2: 27 MMOL/L (ref 24–29)
POTASSIUM BLD-SCNC: 3.9 MMOL/L (ref 3.5–4.9)
PROT SERPL-MCNC: 7.3 GM/DL (ref 6.4–8.2)
RBC # BLD AUTO: 4.8 X10(6)/MCL (ref 4.2–5.4)
SODIUM BLD-SCNC: 140 MMOL/L (ref 138–146)
TSH SERPL-ACNC: 1.26 MIU/L (ref 0.36–3.74)
WBC # SPEC AUTO: 5 X10(3)/MCL (ref 4.5–11.5)

## 2019-02-15 ENCOUNTER — HISTORICAL (OUTPATIENT)
Dept: INFUSION THERAPY | Facility: HOSPITAL | Age: 64
End: 2019-02-15

## 2019-03-13 ENCOUNTER — HISTORICAL (OUTPATIENT)
Dept: ADMINISTRATIVE | Facility: HOSPITAL | Age: 64
End: 2019-03-13

## 2019-03-13 LAB
ABS NEUT (OLG): 3.5 X10(3)/MCL (ref 2.1–9.2)
ALBUMIN SERPL-MCNC: 3.5 GM/DL (ref 3.4–5)
ALP SERPL-CCNC: 106 UNIT/L (ref 38–126)
ALT SERPL-CCNC: 37 UNIT/L (ref 12–78)
ANION GAP SERPL CALC-SCNC: 15 MMOL/L
AST SERPL-CCNC: 28 UNIT/L (ref 15–37)
BASOPHILS # BLD AUTO: 0 X10(3)/MCL (ref 0–0.2)
BASOPHILS NFR BLD AUTO: 0.4 %
BILIRUB SERPL-MCNC: 0.6 MG/DL (ref 0.2–1)
BILIRUBIN DIRECT+TOT PNL SERPL-MCNC: 0.1 MG/DL (ref 0–0.2)
BILIRUBIN DIRECT+TOT PNL SERPL-MCNC: 0.5 MG/DL (ref 0–0.8)
BUN SERPL-MCNC: 16 MG/DL (ref 8–26)
CHLORIDE SERPL-SCNC: 107 MMOL/L (ref 98–109)
CREAT SERPL-MCNC: 0.8 MG/DL (ref 0.6–1.3)
EOSINOPHIL # BLD AUTO: 0.1 X10(3)/MCL (ref 0–0.9)
EOSINOPHIL NFR BLD AUTO: 2.9 %
ERYTHROCYTE [DISTWIDTH] IN BLOOD BY AUTOMATED COUNT: 14 % (ref 11.5–17)
GLUCOSE SERPL-MCNC: 107 MG/DL (ref 70–105)
HCT VFR BLD AUTO: 43.7 % (ref 37–47)
HCT VFR BLD CALC: 42 % (ref 38–51)
HGB BLD-MCNC: 14.2 GM/DL (ref 12–16)
HGB BLD-MCNC: 14.3 MG/DL (ref 12–17)
LIVER PROFILE INTERP: NORMAL
LYMPHOCYTES # BLD AUTO: 0.6 X10(3)/MCL (ref 0.6–4.6)
LYMPHOCYTES NFR BLD AUTO: 12.4 %
MCH RBC QN AUTO: 30.1 PG (ref 27–31)
MCHC RBC AUTO-ENTMCNC: 32.5 GM/DL (ref 33–36)
MCV RBC AUTO: 92.8 FL (ref 80–94)
MONOCYTES # BLD AUTO: 0.5 X10(3)/MCL (ref 0.1–1.3)
MONOCYTES NFR BLD AUTO: 10.5 %
NEUTROPHILS # BLD AUTO: 3.5 X10(3)/MCL (ref 2.1–9.2)
NEUTROPHILS NFR BLD AUTO: 73.8 %
PLATELET # BLD AUTO: 165 X10(3)/MCL (ref 130–400)
PMV BLD AUTO: 8.8 FL (ref 9.4–12.4)
POC IONIZED CALCIUM: 1.15 MMOL/L (ref 1.12–1.32)
POC TCO2: 26 MMOL/L (ref 24–29)
POTASSIUM BLD-SCNC: 4.3 MMOL/L (ref 3.5–4.9)
PROT SERPL-MCNC: 7.4 GM/DL (ref 6.4–8.2)
RBC # BLD AUTO: 4.71 X10(6)/MCL (ref 4.2–5.4)
SODIUM BLD-SCNC: 142 MMOL/L (ref 138–146)
TSH SERPL-ACNC: 1.12 MIU/L (ref 0.36–3.74)
WBC # SPEC AUTO: 4.8 X10(3)/MCL (ref 4.5–11.5)

## 2019-03-14 ENCOUNTER — HISTORICAL (OUTPATIENT)
Dept: INFUSION THERAPY | Facility: HOSPITAL | Age: 64
End: 2019-03-14

## 2019-04-04 ENCOUNTER — HISTORICAL (OUTPATIENT)
Dept: RADIOLOGY | Facility: HOSPITAL | Age: 64
End: 2019-04-04

## 2019-04-10 ENCOUNTER — HISTORICAL (OUTPATIENT)
Dept: ADMINISTRATIVE | Facility: HOSPITAL | Age: 64
End: 2019-04-10

## 2019-04-10 LAB
ABS NEUT (OLG): 3.62 X10(3)/MCL (ref 2.1–9.2)
ALBUMIN SERPL-MCNC: 3.7 GM/DL (ref 3.4–5)
ALP SERPL-CCNC: 115 UNIT/L (ref 38–126)
ALT SERPL-CCNC: 44 UNIT/L (ref 12–78)
ANION GAP SERPL CALC-SCNC: 17 MMOL/L
AST SERPL-CCNC: 22 UNIT/L (ref 15–37)
BASOPHILS # BLD AUTO: 0 X10(3)/MCL (ref 0–0.2)
BASOPHILS NFR BLD AUTO: 0.4 %
BILIRUB SERPL-MCNC: 0.5 MG/DL (ref 0.2–1)
BILIRUBIN DIRECT+TOT PNL SERPL-MCNC: 0.2 MG/DL (ref 0–0.5)
BILIRUBIN DIRECT+TOT PNL SERPL-MCNC: 0.3 MG/DL (ref 0–0.8)
BUN SERPL-MCNC: 20 MG/DL (ref 8–26)
CHLORIDE SERPL-SCNC: 103 MMOL/L (ref 98–109)
CREAT SERPL-MCNC: 0.8 MG/DL (ref 0.6–1.3)
EOSINOPHIL # BLD AUTO: 0.1 X10(3)/MCL (ref 0–0.9)
EOSINOPHIL NFR BLD AUTO: 2.5 %
ERYTHROCYTE [DISTWIDTH] IN BLOOD BY AUTOMATED COUNT: 13.1 % (ref 11.5–17)
GLUCOSE SERPL-MCNC: 119 MG/DL (ref 70–105)
HCT VFR BLD AUTO: 45.4 % (ref 37–47)
HCT VFR BLD CALC: 45 % (ref 38–51)
HGB BLD-MCNC: 14.9 GM/DL (ref 12–16)
HGB BLD-MCNC: 15.3 MG/DL (ref 12–17)
LIVER PROFILE INTERP: NORMAL
LYMPHOCYTES # BLD AUTO: 0.6 X10(3)/MCL (ref 0.6–4.6)
LYMPHOCYTES NFR BLD AUTO: 12.6 %
MCH RBC QN AUTO: 30.7 PG (ref 27–31)
MCHC RBC AUTO-ENTMCNC: 32.8 GM/DL (ref 33–36)
MCV RBC AUTO: 93.4 FL (ref 80–94)
MONOCYTES # BLD AUTO: 0.4 X10(3)/MCL (ref 0.1–1.3)
MONOCYTES NFR BLD AUTO: 8.4 %
NEUTROPHILS # BLD AUTO: 3.6 X10(3)/MCL (ref 2.1–9.2)
NEUTROPHILS NFR BLD AUTO: 76.1 %
PLATELET # BLD AUTO: 160 X10(3)/MCL (ref 130–400)
PMV BLD AUTO: 8.8 FL (ref 9.4–12.4)
POC IONIZED CALCIUM: 1.18 MMOL/L (ref 1.12–1.32)
POC TCO2: 26 MMOL/L (ref 24–29)
POTASSIUM BLD-SCNC: 3.8 MMOL/L (ref 3.5–4.9)
PROT SERPL-MCNC: 7.3 GM/DL (ref 6.4–8.2)
RBC # BLD AUTO: 4.86 X10(6)/MCL (ref 4.2–5.4)
SODIUM BLD-SCNC: 142 MMOL/L (ref 138–146)
TSH SERPL-ACNC: 1.83 MIU/L (ref 0.36–3.74)
WBC # SPEC AUTO: 4.8 X10(3)/MCL (ref 4.5–11.5)

## 2019-04-11 ENCOUNTER — HISTORICAL (OUTPATIENT)
Dept: INFUSION THERAPY | Facility: HOSPITAL | Age: 64
End: 2019-04-11

## 2019-04-20 LAB
BILIRUB SERPL-MCNC: NEGATIVE MG/DL
BLOOD URINE, POC: NORMAL
CLARITY, POC UA: NORMAL
GLUCOSE UR QL STRIP: NEGATIVE
KETONES UR QL STRIP: NEGATIVE
LEUKOCYTE EST, POC UA: NORMAL
NITRITE, POC UA: NEGATIVE
PH, POC UA: 6.5
PROTEIN, POC: NORMAL
SPECIFIC GRAVITY, POC UA: 1.01
UROBILINOGEN, POC UA: NORMAL

## 2019-04-29 ENCOUNTER — HISTORICAL (OUTPATIENT)
Dept: ADMINISTRATIVE | Facility: HOSPITAL | Age: 64
End: 2019-04-29

## 2019-04-29 LAB
APPEARANCE, UA: ABNORMAL
BACTERIA SPEC CULT: ABNORMAL /HPF
BILIRUB UR QL STRIP: NEGATIVE
COLOR UR: YELLOW
GLUCOSE (UA): NEGATIVE
HGB UR QL STRIP: ABNORMAL
KETONES UR QL STRIP: NEGATIVE
LEUKOCYTE ESTERASE UR QL STRIP: ABNORMAL
NITRITE UR QL STRIP: NEGATIVE
PH UR STRIP: 6.5 [PH] (ref 5–9)
PROT UR QL STRIP: ABNORMAL
RBC #/AREA URNS HPF: 6 /HPF (ref 0–2)
SP GR UR STRIP: 1.01 (ref 1–1.03)
SQUAMOUS EPITHELIAL, UA: ABNORMAL
UROBILINOGEN UR STRIP-ACNC: 0.2
WBC #/AREA URNS HPF: 211 /HPF (ref 0–3)

## 2019-05-06 ENCOUNTER — HISTORICAL (OUTPATIENT)
Dept: ADMINISTRATIVE | Facility: HOSPITAL | Age: 64
End: 2019-05-06

## 2019-05-06 LAB
APPEARANCE, UA: ABNORMAL
BACTERIA SPEC CULT: ABNORMAL /HPF
BILIRUB UR QL STRIP: NEGATIVE
COLOR UR: YELLOW
GLUCOSE (UA): NEGATIVE
HGB UR QL STRIP: ABNORMAL
KETONES UR QL STRIP: NEGATIVE
LEUKOCYTE ESTERASE UR QL STRIP: ABNORMAL
NITRITE UR QL STRIP: NEGATIVE
PH UR STRIP: 5 [PH] (ref 5–9)
PROT UR QL STRIP: ABNORMAL
RBC #/AREA URNS HPF: 76 /HPF (ref 0–2)
SP GR UR STRIP: 1.02 (ref 1–1.03)
SQUAMOUS EPITHELIAL, UA: 0 /HPF (ref 0–4)
UROBILINOGEN UR STRIP-ACNC: 0.2
WBC #/AREA URNS HPF: >900 /HPF (ref 0–3)

## 2019-05-08 ENCOUNTER — HISTORICAL (OUTPATIENT)
Dept: ADMINISTRATIVE | Facility: HOSPITAL | Age: 64
End: 2019-05-08

## 2019-05-08 LAB
ABS NEUT (OLG): 4.31 X10(3)/MCL (ref 2.1–9.2)
ALBUMIN SERPL-MCNC: 3.5 GM/DL (ref 3.4–5)
ALP SERPL-CCNC: 121 UNIT/L (ref 38–126)
ALT SERPL-CCNC: 31 UNIT/L (ref 12–78)
ANION GAP SERPL CALC-SCNC: 16 MMOL/L
AST SERPL-CCNC: 23 UNIT/L (ref 15–37)
BASOPHILS # BLD AUTO: 0 X10(3)/MCL (ref 0–0.2)
BASOPHILS NFR BLD AUTO: 0.6 %
BILIRUB SERPL-MCNC: 0.8 MG/DL (ref 0.2–1)
BILIRUBIN DIRECT+TOT PNL SERPL-MCNC: 0.2 MG/DL (ref 0–0.2)
BILIRUBIN DIRECT+TOT PNL SERPL-MCNC: 0.6 MG/DL (ref 0–0.8)
BUN SERPL-MCNC: 20 MG/DL (ref 8–26)
CHLORIDE SERPL-SCNC: 103 MMOL/L (ref 98–109)
CREAT SERPL-MCNC: 1.2 MG/DL (ref 0.6–1.3)
EOSINOPHIL # BLD AUTO: 0.1 X10(3)/MCL (ref 0–0.9)
EOSINOPHIL NFR BLD AUTO: 1.5 %
ERYTHROCYTE [DISTWIDTH] IN BLOOD BY AUTOMATED COUNT: 12.1 % (ref 11.5–17)
GLUCOSE SERPL-MCNC: 97 MG/DL (ref 70–105)
HCT VFR BLD AUTO: 42.9 % (ref 37–47)
HCT VFR BLD CALC: 42 % (ref 38–51)
HGB BLD-MCNC: 14 GM/DL (ref 12–16)
HGB BLD-MCNC: 14.3 MG/DL (ref 12–17)
LIVER PROFILE INTERP: NORMAL
LYMPHOCYTES # BLD AUTO: 0.5 X10(3)/MCL (ref 0.6–4.6)
LYMPHOCYTES NFR BLD AUTO: 9.1 %
MCH RBC QN AUTO: 30.6 PG (ref 27–31)
MCHC RBC AUTO-ENTMCNC: 32.6 GM/DL (ref 33–36)
MCV RBC AUTO: 93.9 FL (ref 80–94)
MONOCYTES # BLD AUTO: 0.4 X10(3)/MCL (ref 0.1–1.3)
MONOCYTES NFR BLD AUTO: 8.4 %
NEUTROPHILS # BLD AUTO: 4.3 X10(3)/MCL (ref 2.1–9.2)
NEUTROPHILS NFR BLD AUTO: 80.2 %
PLATELET # BLD AUTO: 203 X10(3)/MCL (ref 130–400)
PMV BLD AUTO: 8.6 FL (ref 9.4–12.4)
POC IONIZED CALCIUM: 1.17 MMOL/L (ref 1.12–1.32)
POC TCO2: 25 MMOL/L (ref 24–29)
POTASSIUM BLD-SCNC: 4.3 MMOL/L (ref 3.5–4.9)
PROT SERPL-MCNC: 7.7 GM/DL (ref 6.4–8.2)
RBC # BLD AUTO: 4.57 X10(6)/MCL (ref 4.2–5.4)
SODIUM BLD-SCNC: 138 MMOL/L (ref 138–146)
TSH SERPL-ACNC: 1.71 MIU/L (ref 0.36–3.74)
WBC # SPEC AUTO: 5.4 X10(3)/MCL (ref 4.5–11.5)

## 2019-05-09 ENCOUNTER — HISTORICAL (OUTPATIENT)
Dept: INFUSION THERAPY | Facility: HOSPITAL | Age: 64
End: 2019-05-09

## 2019-06-05 ENCOUNTER — HISTORICAL (OUTPATIENT)
Dept: ADMINISTRATIVE | Facility: HOSPITAL | Age: 64
End: 2019-06-05

## 2019-06-05 LAB
ABS NEUT (OLG): 2.05 X10(3)/MCL (ref 2.1–9.2)
ALBUMIN SERPL-MCNC: 3.7 GM/DL (ref 3.4–5)
ALP SERPL-CCNC: 102 UNIT/L (ref 38–126)
ALT SERPL-CCNC: 41 UNIT/L (ref 12–78)
ANION GAP SERPL CALC-SCNC: 17 MMOL/L
AST SERPL-CCNC: 24 UNIT/L (ref 15–37)
BASOPHILS # BLD AUTO: 0 X10(3)/MCL (ref 0–0.2)
BASOPHILS NFR BLD AUTO: 0.3 %
BILIRUB SERPL-MCNC: 1.1 MG/DL (ref 0.2–1)
BILIRUBIN DIRECT+TOT PNL SERPL-MCNC: 0.2 MG/DL (ref 0–0.2)
BILIRUBIN DIRECT+TOT PNL SERPL-MCNC: 0.9 MG/DL (ref 0–0.8)
BUN SERPL-MCNC: 17 MG/DL (ref 8–26)
CHLORIDE SERPL-SCNC: 102 MMOL/L (ref 98–109)
CREAT SERPL-MCNC: 0.9 MG/DL (ref 0.6–1.3)
EOSINOPHIL # BLD AUTO: 0.2 X10(3)/MCL (ref 0–0.9)
EOSINOPHIL NFR BLD AUTO: 5 %
ERYTHROCYTE [DISTWIDTH] IN BLOOD BY AUTOMATED COUNT: 12.4 % (ref 11.5–17)
GLUCOSE SERPL-MCNC: 106 MG/DL (ref 70–105)
HCT VFR BLD AUTO: 43.4 % (ref 37–47)
HCT VFR BLD CALC: 41 % (ref 38–51)
HGB BLD-MCNC: 13.8 GM/DL (ref 12–16)
HGB BLD-MCNC: 13.9 MG/DL (ref 12–17)
LIVER PROFILE INTERP: ABNORMAL
LYMPHOCYTES # BLD AUTO: 0.6 X10(3)/MCL (ref 0.6–4.6)
LYMPHOCYTES NFR BLD AUTO: 18.3 %
MCH RBC QN AUTO: 30.7 PG (ref 27–31)
MCHC RBC AUTO-ENTMCNC: 31.8 GM/DL (ref 33–36)
MCV RBC AUTO: 96.7 FL (ref 80–94)
MONOCYTES # BLD AUTO: 0.4 X10(3)/MCL (ref 0.1–1.3)
MONOCYTES NFR BLD AUTO: 11.7 %
NEUTROPHILS # BLD AUTO: 2 X10(3)/MCL (ref 2.1–9.2)
NEUTROPHILS NFR BLD AUTO: 64.7 %
PLATELET # BLD AUTO: 154 X10(3)/MCL (ref 130–400)
PMV BLD AUTO: 8.8 FL (ref 9.4–12.4)
POC IONIZED CALCIUM: 1.2 MMOL/L (ref 1.12–1.32)
POC TCO2: 29 MMOL/L (ref 24–29)
POTASSIUM BLD-SCNC: 4 MMOL/L (ref 3.5–4.9)
PROT SERPL-MCNC: 7.3 GM/DL (ref 6.4–8.2)
RBC # BLD AUTO: 4.49 X10(6)/MCL (ref 4.2–5.4)
SODIUM BLD-SCNC: 142 MMOL/L (ref 138–146)
TSH SERPL-ACNC: 1.56 MIU/L (ref 0.36–3.74)
WBC # SPEC AUTO: 3.2 X10(3)/MCL (ref 4.5–11.5)

## 2019-06-06 ENCOUNTER — HISTORICAL (OUTPATIENT)
Dept: INFUSION THERAPY | Facility: HOSPITAL | Age: 64
End: 2019-06-06

## 2019-07-01 ENCOUNTER — HISTORICAL (OUTPATIENT)
Dept: HEMATOLOGY/ONCOLOGY | Facility: CLINIC | Age: 64
End: 2019-07-01

## 2019-07-01 LAB
ABS NEUT (OLG): 2.3 X10(3)/MCL (ref 2.1–9.2)
ALBUMIN SERPL-MCNC: 3.6 GM/DL (ref 3.4–5)
ALBUMIN/GLOB SERPL: 1 {RATIO}
ALP SERPL-CCNC: 105 UNIT/L (ref 38–126)
ALT SERPL-CCNC: 41 UNIT/L (ref 12–78)
AST SERPL-CCNC: 22 UNIT/L (ref 15–37)
BASOPHILS # BLD AUTO: 0 X10(3)/MCL (ref 0–0.2)
BASOPHILS NFR BLD AUTO: 0.6 %
BILIRUB SERPL-MCNC: 0.5 MG/DL (ref 0.2–1)
BILIRUBIN DIRECT+TOT PNL SERPL-MCNC: 0.2 MG/DL (ref 0–0.2)
BILIRUBIN DIRECT+TOT PNL SERPL-MCNC: 0.3 MG/DL (ref 0–0.8)
BUN SERPL-MCNC: 15 MG/DL (ref 7–18)
CALCIUM SERPL-MCNC: 9 MG/DL (ref 8.5–10.1)
CEA SERPL-MCNC: 2.1 NG/ML (ref 0–3)
CHLORIDE SERPL-SCNC: 103 MMOL/L (ref 98–107)
CO2 SERPL-SCNC: 31 MMOL/L (ref 21–32)
CREAT SERPL-MCNC: 0.88 MG/DL (ref 0.55–1.02)
EOSINOPHIL # BLD AUTO: 0.2 X10(3)/MCL (ref 0–0.9)
EOSINOPHIL NFR BLD AUTO: 5 %
ERYTHROCYTE [DISTWIDTH] IN BLOOD BY AUTOMATED COUNT: 12.4 % (ref 11.5–17)
GLOBULIN SER-MCNC: 3.7 GM/DL (ref 2.4–3.5)
GLUCOSE SERPL-MCNC: 85 MG/DL (ref 74–106)
HCT VFR BLD AUTO: 45.3 % (ref 37–47)
HGB BLD-MCNC: 14.5 GM/DL (ref 12–16)
LYMPHOCYTES # BLD AUTO: 0.6 X10(3)/MCL (ref 0.6–4.6)
LYMPHOCYTES NFR BLD AUTO: 16.1 %
MCH RBC QN AUTO: 31 PG (ref 27–31)
MCHC RBC AUTO-ENTMCNC: 32 GM/DL (ref 33–36)
MCV RBC AUTO: 97 FL (ref 80–94)
MONOCYTES # BLD AUTO: 0.4 X10(3)/MCL (ref 0.1–1.3)
MONOCYTES NFR BLD AUTO: 10.6 %
NEUTROPHILS # BLD AUTO: 2.3 X10(3)/MCL (ref 2.1–9.2)
NEUTROPHILS NFR BLD AUTO: 67.4 %
PLATELET # BLD AUTO: 140 X10(3)/MCL (ref 130–400)
PMV BLD AUTO: 9 FL (ref 9.4–12.4)
POTASSIUM SERPL-SCNC: 4.1 MMOL/L (ref 3.5–5.1)
PROT SERPL-MCNC: 7.3 GM/DL (ref 6.4–8.2)
RBC # BLD AUTO: 4.67 X10(6)/MCL (ref 4.2–5.4)
SODIUM SERPL-SCNC: 138 MMOL/L (ref 136–145)
TSH SERPL-ACNC: 1.43 MIU/L (ref 0.36–3.74)
WBC # SPEC AUTO: 3.4 X10(3)/MCL (ref 4.5–11.5)

## 2019-07-05 ENCOUNTER — HISTORICAL (OUTPATIENT)
Dept: INFUSION THERAPY | Facility: HOSPITAL | Age: 64
End: 2019-07-05

## 2019-08-02 ENCOUNTER — HISTORICAL (OUTPATIENT)
Dept: INFUSION THERAPY | Facility: HOSPITAL | Age: 64
End: 2019-08-02

## 2019-08-02 LAB
ABS NEUT (OLG): 2.25 X10(3)/MCL (ref 2.1–9.2)
ALBUMIN SERPL-MCNC: 3.7 GM/DL (ref 3.4–5)
ALP SERPL-CCNC: 92 UNIT/L (ref 38–126)
ALT SERPL-CCNC: 37 UNIT/L (ref 12–78)
ANION GAP SERPL CALC-SCNC: 14 MMOL/L
AST SERPL-CCNC: 19 UNIT/L (ref 15–37)
BASOPHILS # BLD AUTO: 0 X10(3)/MCL (ref 0–0.2)
BASOPHILS NFR BLD AUTO: 0.6 %
BILIRUB SERPL-MCNC: 0.6 MG/DL (ref 0.2–1)
BILIRUBIN DIRECT+TOT PNL SERPL-MCNC: 0.1 MG/DL (ref 0–0.5)
BILIRUBIN DIRECT+TOT PNL SERPL-MCNC: 0.5 MG/DL (ref 0–0.8)
BUN SERPL-MCNC: 19 MG/DL (ref 8–26)
CHLORIDE SERPL-SCNC: 103 MMOL/L (ref 98–109)
CREAT SERPL-MCNC: 0.9 MG/DL (ref 0.6–1.3)
EOSINOPHIL # BLD AUTO: 0.1 X10(3)/MCL (ref 0–0.9)
EOSINOPHIL NFR BLD AUTO: 3 %
ERYTHROCYTE [DISTWIDTH] IN BLOOD BY AUTOMATED COUNT: 12.2 % (ref 11.5–17)
GLUCOSE SERPL-MCNC: 98 MG/DL (ref 70–105)
HCT VFR BLD AUTO: 44.1 % (ref 37–47)
HCT VFR BLD CALC: 44 % (ref 38–51)
HGB BLD-MCNC: 14.3 GM/DL (ref 12–16)
HGB BLD-MCNC: 15 MG/DL (ref 12–17)
LIVER PROFILE INTERP: NORMAL
LYMPHOCYTES # BLD AUTO: 0.6 X10(3)/MCL (ref 0.6–4.6)
LYMPHOCYTES NFR BLD AUTO: 17.8 %
MCH RBC QN AUTO: 30.7 PG (ref 27–31)
MCHC RBC AUTO-ENTMCNC: 32.4 GM/DL (ref 33–36)
MCV RBC AUTO: 94.6 FL (ref 80–94)
MONOCYTES # BLD AUTO: 0.4 X10(3)/MCL (ref 0.1–1.3)
MONOCYTES NFR BLD AUTO: 12.1 %
NEUTROPHILS # BLD AUTO: 2.2 X10(3)/MCL (ref 2.1–9.2)
NEUTROPHILS NFR BLD AUTO: 66.5 %
PLATELET # BLD AUTO: 159 X10(3)/MCL (ref 130–400)
PMV BLD AUTO: 8.7 FL (ref 9.4–12.4)
POC IONIZED CALCIUM: 1.28 MMOL/L (ref 1.12–1.32)
POC TCO2: 28 MMOL/L (ref 24–29)
POTASSIUM BLD-SCNC: 4.3 MMOL/L (ref 3.5–4.9)
PROT SERPL-MCNC: 7.6 GM/DL (ref 6.4–8.2)
RBC # BLD AUTO: 4.66 X10(6)/MCL (ref 4.2–5.4)
SODIUM BLD-SCNC: 140 MMOL/L (ref 138–146)
TSH SERPL-ACNC: 1.24 MIU/L (ref 0.36–3.74)
WBC # SPEC AUTO: 3.4 X10(3)/MCL (ref 4.5–11.5)

## 2019-08-30 ENCOUNTER — HISTORICAL (OUTPATIENT)
Dept: INFUSION THERAPY | Facility: HOSPITAL | Age: 64
End: 2019-08-30

## 2019-08-30 LAB
ABS NEUT (OLG): 2.26 X10(3)/MCL (ref 2.1–9.2)
ALBUMIN SERPL-MCNC: 3.7 GM/DL (ref 3.4–5)
ALP SERPL-CCNC: 91 UNIT/L (ref 38–126)
ALT SERPL-CCNC: 33 UNIT/L (ref 12–78)
ANION GAP SERPL CALC-SCNC: 15 MMOL/L
AST SERPL-CCNC: 21 UNIT/L (ref 15–37)
BASOPHILS # BLD AUTO: 0 X10(3)/MCL (ref 0–0.2)
BASOPHILS NFR BLD AUTO: 0.6 %
BILIRUB SERPL-MCNC: 0.6 MG/DL (ref 0.2–1)
BILIRUBIN DIRECT+TOT PNL SERPL-MCNC: 0.2 MG/DL (ref 0–0.5)
BILIRUBIN DIRECT+TOT PNL SERPL-MCNC: 0.4 MG/DL (ref 0–0.8)
BUN SERPL-MCNC: 17 MG/DL (ref 8–26)
CHLORIDE SERPL-SCNC: 103 MMOL/L (ref 98–109)
CREAT SERPL-MCNC: 0.9 MG/DL (ref 0.6–1.3)
EOSINOPHIL # BLD AUTO: 0.1 X10(3)/MCL (ref 0–0.9)
EOSINOPHIL NFR BLD AUTO: 4 %
ERYTHROCYTE [DISTWIDTH] IN BLOOD BY AUTOMATED COUNT: 12 % (ref 11.5–17)
GLUCOSE SERPL-MCNC: 95 MG/DL (ref 70–105)
HCT VFR BLD AUTO: 44.9 % (ref 37–47)
HCT VFR BLD CALC: 45 % (ref 38–51)
HGB BLD-MCNC: 14.6 GM/DL (ref 12–16)
HGB BLD-MCNC: 15.3 MG/DL (ref 12–17)
LIVER PROFILE INTERP: NORMAL
LYMPHOCYTES # BLD AUTO: 0.6 X10(3)/MCL (ref 0.6–4.6)
LYMPHOCYTES NFR BLD AUTO: 17.8 %
MCH RBC QN AUTO: 30.7 PG (ref 27–31)
MCHC RBC AUTO-ENTMCNC: 32.5 GM/DL (ref 33–36)
MCV RBC AUTO: 94.5 FL (ref 80–94)
MONOCYTES # BLD AUTO: 0.4 X10(3)/MCL (ref 0.1–1.3)
MONOCYTES NFR BLD AUTO: 12.6 %
NEUTROPHILS # BLD AUTO: 2.3 X10(3)/MCL (ref 2.1–9.2)
NEUTROPHILS NFR BLD AUTO: 65 %
PLATELET # BLD AUTO: 150 X10(3)/MCL (ref 130–400)
PMV BLD AUTO: 8.9 FL (ref 9.4–12.4)
POC IONIZED CALCIUM: 1.23 MMOL/L (ref 1.12–1.32)
POC TCO2: 27 MMOL/L (ref 24–29)
POTASSIUM BLD-SCNC: 4.3 MMOL/L (ref 3.5–4.9)
PROT SERPL-MCNC: 7.4 GM/DL (ref 6.4–8.2)
RBC # BLD AUTO: 4.75 X10(6)/MCL (ref 4.2–5.4)
SODIUM BLD-SCNC: 140 MMOL/L (ref 138–146)
TSH SERPL-ACNC: 1.33 MIU/L (ref 0.36–3.74)
WBC # SPEC AUTO: 3.5 X10(3)/MCL (ref 4.5–11.5)

## 2019-09-27 ENCOUNTER — HISTORICAL (OUTPATIENT)
Dept: INFUSION THERAPY | Facility: HOSPITAL | Age: 64
End: 2019-09-27

## 2019-09-27 LAB
ABS NEUT (OLG): 1.79 X10(3)/MCL (ref 2.1–9.2)
ALBUMIN SERPL-MCNC: 3.7 GM/DL (ref 3.4–5)
ALP SERPL-CCNC: 86 UNIT/L (ref 38–126)
ALT SERPL-CCNC: 32 UNIT/L (ref 12–78)
ANION GAP SERPL CALC-SCNC: 15 MMOL/L
ANISOCYTOSIS BLD QL SMEAR: 0
AST SERPL-CCNC: 23 UNIT/L (ref 15–37)
BASOPHILS # BLD AUTO: 0 X10(3)/MCL (ref 0–0.2)
BASOPHILS NFR BLD AUTO: 1 %
BASOPHILS NFR BLD MANUAL: 4 % (ref 0–2)
BILIRUB SERPL-MCNC: 0.4 MG/DL (ref 0.2–1)
BILIRUBIN DIRECT+TOT PNL SERPL-MCNC: 0.1 MG/DL (ref 0–0.5)
BILIRUBIN DIRECT+TOT PNL SERPL-MCNC: 0.3 MG/DL (ref 0–0.8)
BUN SERPL-MCNC: 17 MG/DL (ref 8–26)
CHLORIDE SERPL-SCNC: 106 MMOL/L (ref 98–109)
CREAT SERPL-MCNC: 0.8 MG/DL (ref 0.6–1.3)
EOSINOPHIL # BLD AUTO: 0.1 X10(3)/MCL (ref 0–0.9)
EOSINOPHIL NFR BLD AUTO: 3 %
EOSINOPHIL NFR BLD MANUAL: 1 % (ref 0–8)
ERYTHROCYTE [DISTWIDTH] IN BLOOD BY AUTOMATED COUNT: 12.2 % (ref 11.5–17)
GLUCOSE SERPL-MCNC: 90 MG/DL (ref 70–105)
HCT VFR BLD AUTO: 44.5 % (ref 37–47)
HCT VFR BLD CALC: 43 % (ref 38–51)
HGB BLD-MCNC: 14.4 GM/DL (ref 12–16)
HGB BLD-MCNC: 14.6 MG/DL (ref 12–17)
HYPOCHROMIA BLD QL SMEAR: 0
LIVER PROFILE INTERP: NORMAL
LYMPHOCYTES # BLD AUTO: 0.6 X10(3)/MCL (ref 0.6–4.6)
LYMPHOCYTES NFR BLD AUTO: 21.8 %
LYMPHOCYTES NFR BLD MANUAL: 17 % (ref 13–40)
MACROCYTES BLD QL SMEAR: 0
MCH RBC QN AUTO: 30.6 PG (ref 27–31)
MCHC RBC AUTO-ENTMCNC: 32.4 GM/DL (ref 33–36)
MCV RBC AUTO: 94.7 FL (ref 80–94)
MICROCYTES BLD QL SMEAR: 0
MONOCYTES # BLD AUTO: 0.3 X10(3)/MCL (ref 0.1–1.3)
MONOCYTES NFR BLD AUTO: 12.5 %
MONOCYTES NFR BLD MANUAL: 9 % (ref 2–11)
NEUTROPHILS # BLD AUTO: 1.8 X10(3)/MCL (ref 2.1–9.2)
NEUTROPHILS NFR BLD AUTO: 62 %
NEUTROPHILS NFR BLD MANUAL: 70 % (ref 47–80)
PLATELET # BLD AUTO: 152 X10(3)/MCL (ref 130–400)
PLATELET # BLD EST: NORMAL 10*3/UL
PMV BLD AUTO: 9.3 FL (ref 9.4–12.4)
POC IONIZED CALCIUM: 1.1 MMOL/L (ref 1.12–1.32)
POC TCO2: 25 MMOL/L (ref 24–29)
POIKILOCYTOSIS BLD QL SMEAR: 0
POLYCHROMASIA BLD QL SMEAR: 0
POTASSIUM BLD-SCNC: 4.2 MMOL/L (ref 3.5–4.9)
PROT SERPL-MCNC: 7.2 GM/DL (ref 6.4–8.2)
RBC # BLD AUTO: 4.7 X10(6)/MCL (ref 4.2–5.4)
RBC MORPH BLD: NORMAL
SCHISTOCYTES BLD QL AUTO: 0
SODIUM BLD-SCNC: 141 MMOL/L (ref 138–146)
SPHEROCYTES BLD QL SMEAR: 0
TARGETS BLD QL SMEAR: 0
TSH SERPL-ACNC: 1.29 MIU/L (ref 0.36–3.74)
WBC # SPEC AUTO: 2.9 X10(3)/MCL (ref 4.5–11.5)

## 2019-10-25 ENCOUNTER — HISTORICAL (OUTPATIENT)
Dept: INFUSION THERAPY | Facility: HOSPITAL | Age: 64
End: 2019-10-25

## 2019-10-25 LAB
ABS NEUT (OLG): 2.08 X10(3)/MCL (ref 2.1–9.2)
ALBUMIN SERPL-MCNC: 3.7 GM/DL (ref 3.4–5)
ALP SERPL-CCNC: 89 UNIT/L (ref 38–126)
ALT SERPL-CCNC: 31 UNIT/L (ref 12–78)
ANION GAP SERPL CALC-SCNC: 15 MMOL/L
AST SERPL-CCNC: 22 UNIT/L (ref 15–37)
BASOPHILS # BLD AUTO: 0 X10(3)/MCL (ref 0–0.2)
BASOPHILS NFR BLD AUTO: 0.3 %
BILIRUB SERPL-MCNC: 0.8 MG/DL (ref 0.2–1)
BILIRUBIN DIRECT+TOT PNL SERPL-MCNC: 0.2 MG/DL (ref 0–0.5)
BILIRUBIN DIRECT+TOT PNL SERPL-MCNC: 0.6 MG/DL (ref 0–0.8)
BUN SERPL-MCNC: 19 MG/DL (ref 8–26)
CHLORIDE SERPL-SCNC: 105 MMOL/L (ref 98–109)
CREAT SERPL-MCNC: 0.8 MG/DL (ref 0.6–1.3)
EOSINOPHIL # BLD AUTO: 0.2 X10(3)/MCL (ref 0–0.9)
EOSINOPHIL NFR BLD AUTO: 4.3 %
ERYTHROCYTE [DISTWIDTH] IN BLOOD BY AUTOMATED COUNT: 12.2 % (ref 11.5–17)
GLUCOSE SERPL-MCNC: 86 MG/DL (ref 70–105)
HCT VFR BLD AUTO: 44.1 % (ref 37–47)
HCT VFR BLD CALC: 45 % (ref 38–51)
HGB BLD-MCNC: 14.8 GM/DL (ref 12–16)
HGB BLD-MCNC: 15.3 MG/DL (ref 12–17)
LIVER PROFILE INTERP: NORMAL
LYMPHOCYTES # BLD AUTO: 0.8 X10(3)/MCL (ref 0.6–4.6)
LYMPHOCYTES NFR BLD AUTO: 22.8 %
MCH RBC QN AUTO: 31.2 PG (ref 27–31)
MCHC RBC AUTO-ENTMCNC: 33.6 GM/DL (ref 33–36)
MCV RBC AUTO: 92.8 FL (ref 80–94)
MONOCYTES # BLD AUTO: 0.4 X10(3)/MCL (ref 0.1–1.3)
MONOCYTES NFR BLD AUTO: 12.1 %
NEUTROPHILS # BLD AUTO: 2.1 X10(3)/MCL (ref 2.1–9.2)
NEUTROPHILS NFR BLD AUTO: 60.2 %
PLATELET # BLD AUTO: 152 X10(3)/MCL (ref 130–400)
PMV BLD AUTO: 9.1 FL (ref 9.4–12.4)
POC IONIZED CALCIUM: 1.13 MMOL/L (ref 1.12–1.32)
POC TCO2: 26 MMOL/L (ref 24–29)
POTASSIUM BLD-SCNC: 4.1 MMOL/L (ref 3.5–4.9)
PROT SERPL-MCNC: 7.4 GM/DL (ref 6.4–8.2)
RBC # BLD AUTO: 4.75 X10(6)/MCL (ref 4.2–5.4)
SODIUM BLD-SCNC: 140 MMOL/L (ref 138–146)
TSH SERPL-ACNC: 2.03 MIU/L (ref 0.36–3.74)
WBC # SPEC AUTO: 3.5 X10(3)/MCL (ref 4.5–11.5)

## 2019-10-28 ENCOUNTER — HISTORICAL (OUTPATIENT)
Dept: HEMATOLOGY/ONCOLOGY | Facility: CLINIC | Age: 64
End: 2019-10-28

## 2019-11-22 ENCOUNTER — HISTORICAL (OUTPATIENT)
Dept: INFUSION THERAPY | Facility: HOSPITAL | Age: 64
End: 2019-11-22

## 2019-11-22 LAB
ABS NEUT (OLG): 3.27 X10(3)/MCL (ref 2.1–9.2)
ALBUMIN SERPL-MCNC: 3.8 GM/DL (ref 3.4–5)
ALP SERPL-CCNC: 91 UNIT/L (ref 38–126)
ALT SERPL-CCNC: 32 UNIT/L (ref 12–78)
ANION GAP SERPL CALC-SCNC: 16 MMOL/L
AST SERPL-CCNC: 22 UNIT/L (ref 15–37)
BASOPHILS # BLD AUTO: 0 X10(3)/MCL (ref 0–0.2)
BASOPHILS NFR BLD AUTO: 0.2 %
BILIRUB SERPL-MCNC: 0.7 MG/DL (ref 0.2–1)
BILIRUBIN DIRECT+TOT PNL SERPL-MCNC: 0.2 MG/DL (ref 0–0.5)
BILIRUBIN DIRECT+TOT PNL SERPL-MCNC: 0.5 MG/DL (ref 0–0.8)
BUN SERPL-MCNC: 18 MG/DL (ref 8–26)
CHLORIDE SERPL-SCNC: 101 MMOL/L (ref 98–109)
CREAT SERPL-MCNC: 0.9 MG/DL (ref 0.6–1.3)
EOSINOPHIL # BLD AUTO: 0.2 X10(3)/MCL (ref 0–0.9)
EOSINOPHIL NFR BLD AUTO: 3.5 %
ERYTHROCYTE [DISTWIDTH] IN BLOOD BY AUTOMATED COUNT: 12.2 % (ref 11.5–17)
GLUCOSE SERPL-MCNC: 107 MG/DL (ref 70–105)
HCT VFR BLD AUTO: 44.2 % (ref 37–47)
HCT VFR BLD CALC: 44 % (ref 38–51)
HGB BLD-MCNC: 14.4 GM/DL (ref 12–16)
HGB BLD-MCNC: 15 MG/DL (ref 12–17)
LIVER PROFILE INTERP: NORMAL
LYMPHOCYTES # BLD AUTO: 0.7 X10(3)/MCL (ref 0.6–4.6)
LYMPHOCYTES NFR BLD AUTO: 14.8 %
MCH RBC QN AUTO: 30.4 PG (ref 27–31)
MCHC RBC AUTO-ENTMCNC: 32.6 GM/DL (ref 33–36)
MCV RBC AUTO: 93.2 FL (ref 80–94)
MONOCYTES # BLD AUTO: 0.4 X10(3)/MCL (ref 0.1–1.3)
MONOCYTES NFR BLD AUTO: 9.3 %
NEUTROPHILS # BLD AUTO: 3.3 X10(3)/MCL (ref 2.1–9.2)
NEUTROPHILS NFR BLD AUTO: 72 %
PLATELET # BLD AUTO: 163 X10(3)/MCL (ref 130–400)
PMV BLD AUTO: 8.7 FL (ref 9.4–12.4)
POC IONIZED CALCIUM: 1.19 MMOL/L (ref 1.12–1.32)
POC TCO2: 27 MMOL/L (ref 24–29)
POTASSIUM BLD-SCNC: 4.1 MMOL/L (ref 3.5–4.9)
PROT SERPL-MCNC: 7.5 GM/DL (ref 6.4–8.2)
RBC # BLD AUTO: 4.74 X10(6)/MCL (ref 4.2–5.4)
SODIUM BLD-SCNC: 139 MMOL/L (ref 138–146)
TSH SERPL-ACNC: 1.5 MIU/L (ref 0.36–3.74)
WBC # SPEC AUTO: 4.5 X10(3)/MCL (ref 4.5–11.5)

## 2019-12-19 ENCOUNTER — HISTORICAL (OUTPATIENT)
Dept: INFUSION THERAPY | Facility: HOSPITAL | Age: 64
End: 2019-12-19

## 2019-12-19 LAB
ABS NEUT (OLG): 2.92 X10(3)/MCL (ref 2.1–9.2)
ALBUMIN SERPL-MCNC: 3.9 GM/DL (ref 3.4–5)
ALP SERPL-CCNC: 88 UNIT/L (ref 38–126)
ALT SERPL-CCNC: 34 UNIT/L (ref 12–78)
ANION GAP SERPL CALC-SCNC: 13 MMOL/L
AST SERPL-CCNC: 19 UNIT/L (ref 15–37)
BASOPHILS # BLD AUTO: 0 X10(3)/MCL (ref 0–0.2)
BASOPHILS NFR BLD AUTO: 0.2 %
BILIRUB SERPL-MCNC: 0.7 MG/DL (ref 0.2–1)
BILIRUBIN DIRECT+TOT PNL SERPL-MCNC: 0.2 MG/DL (ref 0–0.2)
BILIRUBIN DIRECT+TOT PNL SERPL-MCNC: 0.5 MG/DL (ref 0–0.8)
BUN SERPL-MCNC: 17 MG/DL (ref 8–26)
CHLORIDE SERPL-SCNC: 103 MMOL/L (ref 98–109)
CREAT SERPL-MCNC: 0.9 MG/DL (ref 0.6–1.3)
EOSINOPHIL # BLD AUTO: 0.1 X10(3)/MCL (ref 0–0.9)
EOSINOPHIL NFR BLD AUTO: 2.7 %
ERYTHROCYTE [DISTWIDTH] IN BLOOD BY AUTOMATED COUNT: 12.3 % (ref 11.5–17)
GLUCOSE SERPL-MCNC: 100 MG/DL (ref 70–105)
HCT VFR BLD AUTO: 43.6 % (ref 37–47)
HCT VFR BLD CALC: 43 % (ref 38–51)
HGB BLD-MCNC: 14.2 GM/DL (ref 12–16)
HGB BLD-MCNC: 14.6 MG/DL (ref 12–17)
LIVER PROFILE INTERP: NORMAL
LYMPHOCYTES # BLD AUTO: 0.7 X10(3)/MCL (ref 0.6–4.6)
LYMPHOCYTES NFR BLD AUTO: 16.6 %
MCH RBC QN AUTO: 30.5 PG (ref 27–31)
MCHC RBC AUTO-ENTMCNC: 32.6 GM/DL (ref 33–36)
MCV RBC AUTO: 93.8 FL (ref 80–94)
MONOCYTES # BLD AUTO: 0.4 X10(3)/MCL (ref 0.1–1.3)
MONOCYTES NFR BLD AUTO: 9.3 %
NEUTROPHILS # BLD AUTO: 2.9 X10(3)/MCL (ref 2.1–9.2)
NEUTROPHILS NFR BLD AUTO: 71.2 %
PLATELET # BLD AUTO: 155 X10(3)/MCL (ref 130–400)
PMV BLD AUTO: 8.7 FL (ref 9.4–12.4)
POC IONIZED CALCIUM: 1.19 MMOL/L (ref 1.12–1.32)
POC TCO2: 28 MMOL/L (ref 24–29)
POTASSIUM BLD-SCNC: 4.2 MMOL/L (ref 3.5–4.9)
PROT SERPL-MCNC: 7.3 GM/DL (ref 6.4–8.2)
RBC # BLD AUTO: 4.65 X10(6)/MCL (ref 4.2–5.4)
SODIUM BLD-SCNC: 139 MMOL/L (ref 138–146)
TSH SERPL-ACNC: 1.45 MIU/L (ref 0.36–3.74)
WBC # SPEC AUTO: 4.1 X10(3)/MCL (ref 4.5–11.5)

## 2020-01-16 ENCOUNTER — HISTORICAL (OUTPATIENT)
Dept: INFUSION THERAPY | Facility: HOSPITAL | Age: 65
End: 2020-01-16

## 2020-02-13 ENCOUNTER — HISTORICAL (OUTPATIENT)
Dept: INFUSION THERAPY | Facility: HOSPITAL | Age: 65
End: 2020-02-13

## 2020-02-21 ENCOUNTER — HISTORICAL (OUTPATIENT)
Dept: RADIOLOGY | Facility: HOSPITAL | Age: 65
End: 2020-02-21

## 2020-02-24 ENCOUNTER — HISTORICAL (OUTPATIENT)
Dept: RADIOLOGY | Facility: HOSPITAL | Age: 65
End: 2020-02-24

## 2020-03-12 ENCOUNTER — HISTORICAL (OUTPATIENT)
Dept: INFUSION THERAPY | Facility: HOSPITAL | Age: 65
End: 2020-03-12

## 2020-04-09 ENCOUNTER — HISTORICAL (OUTPATIENT)
Dept: INFUSION THERAPY | Facility: HOSPITAL | Age: 65
End: 2020-04-09

## 2020-05-07 ENCOUNTER — HISTORICAL (OUTPATIENT)
Dept: INFUSION THERAPY | Facility: HOSPITAL | Age: 65
End: 2020-05-07

## 2020-06-04 ENCOUNTER — HISTORICAL (OUTPATIENT)
Dept: INFUSION THERAPY | Facility: HOSPITAL | Age: 65
End: 2020-06-04

## 2020-06-17 ENCOUNTER — HISTORICAL (OUTPATIENT)
Dept: RADIOLOGY | Facility: HOSPITAL | Age: 65
End: 2020-06-17

## 2020-07-02 ENCOUNTER — HISTORICAL (OUTPATIENT)
Dept: INFUSION THERAPY | Facility: HOSPITAL | Age: 65
End: 2020-07-02

## 2020-07-30 ENCOUNTER — HISTORICAL (OUTPATIENT)
Dept: INFUSION THERAPY | Facility: HOSPITAL | Age: 65
End: 2020-07-30

## 2020-08-28 ENCOUNTER — HISTORICAL (OUTPATIENT)
Dept: INFUSION THERAPY | Facility: HOSPITAL | Age: 65
End: 2020-08-28

## 2020-09-24 ENCOUNTER — HISTORICAL (OUTPATIENT)
Dept: INFUSION THERAPY | Facility: HOSPITAL | Age: 65
End: 2020-09-24

## 2020-10-16 ENCOUNTER — HISTORICAL (OUTPATIENT)
Dept: RADIOLOGY | Facility: HOSPITAL | Age: 65
End: 2020-10-16

## 2020-10-21 ENCOUNTER — HISTORICAL (OUTPATIENT)
Dept: ADMINISTRATIVE | Facility: HOSPITAL | Age: 65
End: 2020-10-21

## 2020-10-21 LAB
ABS NEUT (OLG): 2.91 X10(3)/MCL (ref 2.1–9.2)
ALBUMIN SERPL-MCNC: 4 GM/DL (ref 3.4–5)
ALBUMIN/GLOB SERPL: 1.2 RATIO (ref 1.1–2)
ALP SERPL-CCNC: 74 UNIT/L (ref 40–150)
ALT SERPL-CCNC: 23 UNIT/L (ref 0–55)
AST SERPL-CCNC: 20 UNIT/L (ref 5–34)
BASOPHILS # BLD AUTO: 0 X10(3)/MCL (ref 0–0.2)
BASOPHILS NFR BLD AUTO: 0.7 %
BILIRUB SERPL-MCNC: 0.6 MG/DL
BILIRUBIN DIRECT+TOT PNL SERPL-MCNC: 0.2 MG/DL (ref 0–0.5)
BILIRUBIN DIRECT+TOT PNL SERPL-MCNC: 0.4 MG/DL (ref 0–0.8)
BUN SERPL-MCNC: 16.8 MG/DL (ref 9.8–20.1)
CALCIUM SERPL-MCNC: 8.8 MG/DL (ref 8.4–10.2)
CHLORIDE SERPL-SCNC: 104 MMOL/L (ref 98–107)
CO2 SERPL-SCNC: 28 MMOL/L (ref 23–31)
CREAT SERPL-MCNC: 0.84 MG/DL (ref 0.55–1.02)
EOSINOPHIL # BLD AUTO: 0.3 X10(3)/MCL (ref 0–0.9)
EOSINOPHIL NFR BLD AUTO: 6.4 %
ERYTHROCYTE [DISTWIDTH] IN BLOOD BY AUTOMATED COUNT: 12 % (ref 11.5–17)
GLOBULIN SER-MCNC: 3.2 GM/DL (ref 2.4–3.5)
GLUCOSE SERPL-MCNC: 91 MG/DL (ref 82–115)
HCT VFR BLD AUTO: 41.6 % (ref 37–47)
HGB BLD-MCNC: 14.1 GM/DL (ref 12–16)
LYMPHOCYTES # BLD AUTO: 0.9 X10(3)/MCL (ref 0.6–4.6)
LYMPHOCYTES NFR BLD AUTO: 20.8 %
MCH RBC QN AUTO: 31.1 PG (ref 27–31)
MCHC RBC AUTO-ENTMCNC: 33.9 GM/DL (ref 33–36)
MCV RBC AUTO: 91.6 FL (ref 80–94)
MONOCYTES # BLD AUTO: 0.3 X10(3)/MCL (ref 0.1–1.3)
MONOCYTES NFR BLD AUTO: 7.5 %
NEUTROPHILS # BLD AUTO: 2.9 X10(3)/MCL (ref 2.1–9.2)
NEUTROPHILS NFR BLD AUTO: 64.4 %
PLATELET # BLD AUTO: 157 X10(3)/MCL (ref 130–400)
PMV BLD AUTO: 8.8 FL (ref 9.4–12.4)
POTASSIUM SERPL-SCNC: 4.3 MMOL/L (ref 3.5–5.1)
PROT SERPL-MCNC: 7.2 GM/DL (ref 5.8–7.6)
RBC # BLD AUTO: 4.54 X10(6)/MCL (ref 4.2–5.4)
SODIUM SERPL-SCNC: 138 MMOL/L (ref 136–145)
TSH SERPL-ACNC: 1.56 UIU/ML (ref 0.35–4.94)
WBC # SPEC AUTO: 4.5 X10(3)/MCL (ref 4.5–11.5)

## 2020-10-22 ENCOUNTER — HISTORICAL (OUTPATIENT)
Dept: INFUSION THERAPY | Facility: HOSPITAL | Age: 65
End: 2020-10-22

## 2020-11-19 ENCOUNTER — HISTORICAL (OUTPATIENT)
Dept: INFUSION THERAPY | Facility: HOSPITAL | Age: 65
End: 2020-11-19

## 2020-11-19 LAB
ABS NEUT (OLG): 2.95 X10(3)/MCL (ref 2.1–9.2)
ALBUMIN SERPL-MCNC: 3.9 GM/DL (ref 3.4–4.8)
ALBUMIN/GLOB SERPL: 1.3 RATIO (ref 1.1–2)
ALP SERPL-CCNC: 74 UNIT/L (ref 40–150)
ALT SERPL-CCNC: 26 UNIT/L (ref 0–55)
AST SERPL-CCNC: 23 UNIT/L (ref 5–34)
BASOPHILS # BLD AUTO: 0 X10(3)/MCL (ref 0–0.2)
BASOPHILS NFR BLD AUTO: 0.4 %
BILIRUB SERPL-MCNC: 0.8 MG/DL
BILIRUBIN DIRECT+TOT PNL SERPL-MCNC: 0.3 MG/DL (ref 0–0.5)
BILIRUBIN DIRECT+TOT PNL SERPL-MCNC: 0.5 MG/DL (ref 0–0.8)
BUN SERPL-MCNC: 14.6 MG/DL (ref 9.8–20.1)
CALCIUM SERPL-MCNC: 8.8 MG/DL (ref 8.4–10.2)
CHLORIDE SERPL-SCNC: 106 MMOL/L (ref 98–107)
CO2 SERPL-SCNC: 27 MMOL/L (ref 23–31)
CREAT SERPL-MCNC: 0.86 MG/DL (ref 0.55–1.02)
EOSINOPHIL # BLD AUTO: 0.6 X10(3)/MCL (ref 0–0.9)
EOSINOPHIL NFR BLD AUTO: 13.5 %
ERYTHROCYTE [DISTWIDTH] IN BLOOD BY AUTOMATED COUNT: 12.2 % (ref 11.5–17)
GLOBULIN SER-MCNC: 3.1 GM/DL (ref 2.4–3.5)
GLUCOSE SERPL-MCNC: 99 MG/DL (ref 82–115)
HCT VFR BLD AUTO: 41.7 % (ref 37–47)
HGB BLD-MCNC: 14 GM/DL (ref 12–16)
LYMPHOCYTES # BLD AUTO: 0.7 X10(3)/MCL (ref 0.6–4.6)
LYMPHOCYTES NFR BLD AUTO: 15.1 %
MCH RBC QN AUTO: 30.7 PG (ref 27–31)
MCHC RBC AUTO-ENTMCNC: 33.6 GM/DL (ref 33–36)
MCV RBC AUTO: 91.4 FL (ref 80–94)
MONOCYTES # BLD AUTO: 0.4 X10(3)/MCL (ref 0.1–1.3)
MONOCYTES NFR BLD AUTO: 7.5 %
NEUTROPHILS # BLD AUTO: 3 X10(3)/MCL (ref 2.1–9.2)
NEUTROPHILS NFR BLD AUTO: 63.5 %
PLATELET # BLD AUTO: 151 X10(3)/MCL (ref 130–400)
PMV BLD AUTO: 8.7 FL (ref 9.4–12.4)
POTASSIUM SERPL-SCNC: 4.5 MMOL/L (ref 3.5–5.1)
PROT SERPL-MCNC: 7 GM/DL (ref 5.8–7.6)
RBC # BLD AUTO: 4.56 X10(6)/MCL (ref 4.2–5.4)
SODIUM SERPL-SCNC: 141 MMOL/L (ref 136–145)
TSH SERPL-ACNC: 1.13 UIU/ML (ref 0.35–4.94)
WBC # SPEC AUTO: 4.6 X10(3)/MCL (ref 4.5–11.5)

## 2020-12-17 ENCOUNTER — HISTORICAL (OUTPATIENT)
Dept: INFUSION THERAPY | Facility: HOSPITAL | Age: 65
End: 2020-12-17

## 2020-12-17 LAB
ABS NEUT (OLG): 1.86 X10(3)/MCL (ref 2.1–9.2)
ALBUMIN SERPL-MCNC: 4 GM/DL (ref 3.4–4.8)
ALBUMIN/GLOB SERPL: 1.3 RATIO (ref 1.1–2)
ALP SERPL-CCNC: 70 UNIT/L (ref 40–150)
ALT SERPL-CCNC: 24 UNIT/L (ref 0–55)
AST SERPL-CCNC: 21 UNIT/L (ref 5–34)
BASOPHILS # BLD AUTO: 0 X10(3)/MCL (ref 0–0.2)
BASOPHILS NFR BLD AUTO: 0.6 %
BILIRUB SERPL-MCNC: 0.7 MG/DL
BILIRUBIN DIRECT+TOT PNL SERPL-MCNC: 0.3 MG/DL (ref 0–0.5)
BILIRUBIN DIRECT+TOT PNL SERPL-MCNC: 0.4 MG/DL (ref 0–0.8)
BUN SERPL-MCNC: 17 MG/DL (ref 9.8–20.1)
CALCIUM SERPL-MCNC: 8.8 MG/DL (ref 8.4–10.2)
CHLORIDE SERPL-SCNC: 106 MMOL/L (ref 98–107)
CO2 SERPL-SCNC: 28 MMOL/L (ref 23–31)
CREAT SERPL-MCNC: 0.85 MG/DL (ref 0.55–1.02)
EOSINOPHIL # BLD AUTO: 0.4 X10(3)/MCL (ref 0–0.9)
EOSINOPHIL NFR BLD AUTO: 10.5 %
ERYTHROCYTE [DISTWIDTH] IN BLOOD BY AUTOMATED COUNT: 11.9 % (ref 11.5–17)
GLOBULIN SER-MCNC: 3.1 GM/DL (ref 2.4–3.5)
GLUCOSE SERPL-MCNC: 89 MG/DL (ref 82–115)
HCT VFR BLD AUTO: 42.9 % (ref 37–47)
HGB BLD-MCNC: 14.3 GM/DL (ref 12–16)
LYMPHOCYTES # BLD AUTO: 0.8 X10(3)/MCL (ref 0.6–4.6)
LYMPHOCYTES NFR BLD AUTO: 24.1 %
MCH RBC QN AUTO: 30.6 PG (ref 27–31)
MCHC RBC AUTO-ENTMCNC: 33.3 GM/DL (ref 33–36)
MCV RBC AUTO: 91.7 FL (ref 80–94)
MONOCYTES # BLD AUTO: 0.3 X10(3)/MCL (ref 0.1–1.3)
MONOCYTES NFR BLD AUTO: 8.7 %
NEUTROPHILS # BLD AUTO: 1.9 X10(3)/MCL (ref 2.1–9.2)
NEUTROPHILS NFR BLD AUTO: 56.1 %
PLATELET # BLD AUTO: 162 X10(3)/MCL (ref 130–400)
PMV BLD AUTO: 8.8 FL (ref 9.4–12.4)
POTASSIUM SERPL-SCNC: 4.1 MMOL/L (ref 3.5–5.1)
PROT SERPL-MCNC: 7.1 GM/DL (ref 5.8–7.6)
RBC # BLD AUTO: 4.68 X10(6)/MCL (ref 4.2–5.4)
SODIUM SERPL-SCNC: 141 MMOL/L (ref 136–145)
TSH SERPL-ACNC: 1.61 UIU/ML (ref 0.35–4.94)
WBC # SPEC AUTO: 3.3 X10(3)/MCL (ref 4.5–11.5)

## 2021-01-14 ENCOUNTER — HISTORICAL (OUTPATIENT)
Dept: INFUSION THERAPY | Facility: HOSPITAL | Age: 66
End: 2021-01-14

## 2021-01-14 LAB
ABS NEUT (OLG): 1.47 X10(3)/MCL (ref 2.1–9.2)
ALBUMIN SERPL-MCNC: 3.9 GM/DL (ref 3.4–4.8)
ALBUMIN/GLOB SERPL: 1.2 RATIO (ref 1.1–2)
ALP SERPL-CCNC: 81 UNIT/L (ref 40–150)
ALT SERPL-CCNC: 26 UNIT/L (ref 0–55)
ANISOCYTOSIS BLD QL SMEAR: ABNORMAL
AST SERPL-CCNC: 20 UNIT/L (ref 5–34)
BASOPHILS # BLD AUTO: 0 X10(3)/MCL (ref 0–0.2)
BASOPHILS NFR BLD AUTO: 0.7 %
BASOPHILS NFR BLD MANUAL: 1 % (ref 0–2)
BILIRUB SERPL-MCNC: 0.6 MG/DL
BILIRUBIN DIRECT+TOT PNL SERPL-MCNC: 0.2 MG/DL (ref 0–0.5)
BILIRUBIN DIRECT+TOT PNL SERPL-MCNC: 0.4 MG/DL (ref 0–0.8)
BUN SERPL-MCNC: 16.5 MG/DL (ref 9.8–20.1)
CALCIUM SERPL-MCNC: 9.1 MG/DL (ref 8.4–10.2)
CHLORIDE SERPL-SCNC: 105 MMOL/L (ref 98–107)
CO2 SERPL-SCNC: 27 MMOL/L (ref 23–31)
CREAT SERPL-MCNC: 0.75 MG/DL (ref 0.55–1.02)
EOSINOPHIL # BLD AUTO: 0.2 X10(3)/MCL (ref 0–0.9)
EOSINOPHIL NFR BLD AUTO: 8.8 %
EOSINOPHIL NFR BLD MANUAL: 2 % (ref 0–8)
ERYTHROCYTE [DISTWIDTH] IN BLOOD BY AUTOMATED COUNT: 12.2 % (ref 11.5–17)
GLOBULIN SER-MCNC: 3.2 GM/DL (ref 2.4–3.5)
GLUCOSE SERPL-MCNC: 81 MG/DL (ref 82–115)
GRANULOCYTES NFR BLD MANUAL: 25 % (ref 47–80)
HCT VFR BLD AUTO: 42.4 % (ref 37–47)
HGB BLD-MCNC: 13.9 GM/DL (ref 12–16)
LYMPHOCYTES # BLD AUTO: 0.8 X10(3)/MCL (ref 0.6–4.6)
LYMPHOCYTES NFR BLD AUTO: 27.4 %
LYMPHOCYTES NFR BLD MANUAL: 67 % (ref 13–40)
MACROCYTES BLD QL SMEAR: ABNORMAL
MCH RBC QN AUTO: 30.3 PG (ref 27–31)
MCHC RBC AUTO-ENTMCNC: 32.8 GM/DL (ref 33–36)
MCV RBC AUTO: 92.6 FL (ref 80–94)
MONOCYTES # BLD AUTO: 0.3 X10(3)/MCL (ref 0.1–1.3)
MONOCYTES NFR BLD AUTO: 11.6 %
MONOCYTES NFR BLD MANUAL: 5 % (ref 2–11)
NEUTROPHILS # BLD AUTO: 1.5 X10(3)/MCL (ref 2.1–9.2)
NEUTROPHILS NFR BLD AUTO: 51.5 %
PLATELET # BLD AUTO: 146 X10(3)/MCL (ref 130–400)
PLATELET # BLD EST: ADEQUATE 10*3/UL
PMV BLD AUTO: 8.6 FL (ref 9.4–12.4)
POTASSIUM SERPL-SCNC: 4.4 MMOL/L (ref 3.5–5.1)
PROT SERPL-MCNC: 7.1 GM/DL (ref 5.8–7.6)
RBC # BLD AUTO: 4.58 X10(6)/MCL (ref 4.2–5.4)
RBC MORPH BLD: ABNORMAL
SODIUM SERPL-SCNC: 140 MMOL/L (ref 136–145)
TSH SERPL-ACNC: 1.59 UIU/ML (ref 0.35–4.94)
WBC # SPEC AUTO: 2.8 X10(3)/MCL (ref 4.5–11.5)

## 2021-02-11 ENCOUNTER — HISTORICAL (OUTPATIENT)
Dept: INFUSION THERAPY | Facility: HOSPITAL | Age: 66
End: 2021-02-11

## 2021-02-11 LAB
ABS NEUT (OLG): 1.83 X10(3)/MCL (ref 2.1–9.2)
ALBUMIN SERPL-MCNC: 3.9 GM/DL (ref 3.4–4.8)
ALBUMIN/GLOB SERPL: 1.1 RATIO (ref 1.1–2)
ALP SERPL-CCNC: 81 UNIT/L (ref 40–150)
ALT SERPL-CCNC: 27 UNIT/L (ref 0–55)
AST SERPL-CCNC: 19 UNIT/L (ref 5–34)
BASOPHILS # BLD AUTO: 0 X10(3)/MCL (ref 0–0.2)
BASOPHILS NFR BLD AUTO: 1.2 %
BILIRUB SERPL-MCNC: 0.7 MG/DL
BILIRUBIN DIRECT+TOT PNL SERPL-MCNC: 0.3 MG/DL (ref 0–0.5)
BILIRUBIN DIRECT+TOT PNL SERPL-MCNC: 0.4 MG/DL (ref 0–0.8)
BUN SERPL-MCNC: 17.6 MG/DL (ref 9.8–20.1)
CALCIUM SERPL-MCNC: 9.3 MG/DL (ref 8.4–10.2)
CHLORIDE SERPL-SCNC: 104 MMOL/L (ref 98–107)
CO2 SERPL-SCNC: 31 MMOL/L (ref 23–31)
CREAT SERPL-MCNC: 0.83 MG/DL (ref 0.55–1.02)
EOSINOPHIL # BLD AUTO: 0.1 X10(3)/MCL (ref 0–0.9)
EOSINOPHIL NFR BLD AUTO: 3.4 %
ERYTHROCYTE [DISTWIDTH] IN BLOOD BY AUTOMATED COUNT: 12.3 % (ref 11.5–17)
GLOBULIN SER-MCNC: 3.5 GM/DL (ref 2.4–3.5)
GLUCOSE SERPL-MCNC: 88 MG/DL (ref 82–115)
HCT VFR BLD AUTO: 42.1 % (ref 37–47)
HGB BLD-MCNC: 14.1 GM/DL (ref 12–16)
LYMPHOCYTES # BLD AUTO: 0.9 X10(3)/MCL (ref 0.6–4.6)
LYMPHOCYTES NFR BLD AUTO: 27.1 %
MCH RBC QN AUTO: 30.5 PG (ref 27–31)
MCHC RBC AUTO-ENTMCNC: 33.5 GM/DL (ref 33–36)
MCV RBC AUTO: 91.1 FL (ref 80–94)
MONOCYTES # BLD AUTO: 0.4 X10(3)/MCL (ref 0.1–1.3)
MONOCYTES NFR BLD AUTO: 12 %
NEUTROPHILS # BLD AUTO: 1.8 X10(3)/MCL (ref 2.1–9.2)
NEUTROPHILS NFR BLD AUTO: 56.3 %
PLATELET # BLD AUTO: 184 X10(3)/MCL (ref 130–400)
PMV BLD AUTO: 8.6 FL (ref 9.4–12.4)
POTASSIUM SERPL-SCNC: 4.4 MMOL/L (ref 3.5–5.1)
PROT SERPL-MCNC: 7.4 GM/DL (ref 5.8–7.6)
RBC # BLD AUTO: 4.62 X10(6)/MCL (ref 4.2–5.4)
SODIUM SERPL-SCNC: 140 MMOL/L (ref 136–145)
TSH SERPL-ACNC: 1.58 UIU/ML (ref 0.35–4.94)
WBC # SPEC AUTO: 3.2 X10(3)/MCL (ref 4.5–11.5)

## 2021-02-17 ENCOUNTER — HISTORICAL (OUTPATIENT)
Dept: ADMINISTRATIVE | Facility: HOSPITAL | Age: 66
End: 2021-02-17

## 2021-02-17 LAB
ABS NEUT (OLG): 2.22 X10(3)/MCL (ref 2.1–9.2)
ALBUMIN SERPL-MCNC: 3.8 GM/DL (ref 3.4–4.8)
ALBUMIN/GLOB SERPL: 1.1 RATIO (ref 1.1–2)
ALP SERPL-CCNC: 81 UNIT/L (ref 40–150)
ALT SERPL-CCNC: 34 UNIT/L (ref 0–55)
AST SERPL-CCNC: 21 UNIT/L (ref 5–34)
BASOPHILS # BLD AUTO: 0 X10(3)/MCL (ref 0–0.2)
BASOPHILS NFR BLD AUTO: 1 %
BILIRUB SERPL-MCNC: 0.6 MG/DL
BILIRUBIN DIRECT+TOT PNL SERPL-MCNC: 0.2 MG/DL (ref 0–0.5)
BILIRUBIN DIRECT+TOT PNL SERPL-MCNC: 0.4 MG/DL (ref 0–0.8)
BUN SERPL-MCNC: 18 MG/DL (ref 9.8–20.1)
CALCIUM SERPL-MCNC: 8.9 MG/DL (ref 8.4–10.2)
CHLORIDE SERPL-SCNC: 104 MMOL/L (ref 98–107)
CHOLEST SERPL-MCNC: 188 MG/DL
CHOLEST/HDLC SERPL: 4 {RATIO} (ref 0–5)
CO2 SERPL-SCNC: 27 MMOL/L (ref 23–31)
CREAT SERPL-MCNC: 0.9 MG/DL (ref 0.55–1.02)
EOSINOPHIL # BLD AUTO: 0.2 X10(3)/MCL (ref 0–0.9)
EOSINOPHIL NFR BLD AUTO: 4 %
ERYTHROCYTE [DISTWIDTH] IN BLOOD BY AUTOMATED COUNT: 12.3 % (ref 11.5–17)
GLOBULIN SER-MCNC: 3.4 GM/DL (ref 2.4–3.5)
GLUCOSE SERPL-MCNC: 92 MG/DL (ref 82–115)
HCT VFR BLD AUTO: 45.6 % (ref 37–47)
HDLC SERPL-MCNC: 47 MG/DL (ref 35–60)
HGB BLD-MCNC: 14.6 GM/DL (ref 12–16)
LDLC SERPL CALC-MCNC: 115 MG/DL (ref 50–140)
LYMPHOCYTES # BLD AUTO: 1 X10(3)/MCL (ref 0.6–4.6)
LYMPHOCYTES NFR BLD AUTO: 26 %
MCH RBC QN AUTO: 30.3 PG (ref 27–31)
MCHC RBC AUTO-ENTMCNC: 32 GM/DL (ref 33–36)
MCV RBC AUTO: 94.6 FL (ref 80–94)
MONOCYTES # BLD AUTO: 0.3 X10(3)/MCL (ref 0.1–1.3)
MONOCYTES NFR BLD AUTO: 9 %
NEUTROPHILS # BLD AUTO: 2.22 X10(3)/MCL (ref 2.1–9.2)
NEUTROPHILS NFR BLD AUTO: 60 %
PLATELET # BLD AUTO: 205 X10(3)/MCL (ref 130–400)
PMV BLD AUTO: 9.4 FL (ref 9.4–12.4)
POTASSIUM SERPL-SCNC: 3.9 MMOL/L (ref 3.5–5.1)
PROT SERPL-MCNC: 7.2 GM/DL (ref 5.8–7.6)
RBC # BLD AUTO: 4.82 X10(6)/MCL (ref 4.2–5.4)
SODIUM SERPL-SCNC: 140 MMOL/L (ref 136–145)
TRIGL SERPL-MCNC: 132 MG/DL (ref 37–140)
TSH SERPL-ACNC: 3.16 UIU/ML (ref 0.35–4.94)
VLDLC SERPL CALC-MCNC: 26 MG/DL
WBC # SPEC AUTO: 3.7 X10(3)/MCL (ref 4.5–11.5)

## 2021-03-01 ENCOUNTER — HISTORICAL (OUTPATIENT)
Dept: RADIOLOGY | Facility: HOSPITAL | Age: 66
End: 2021-03-01

## 2021-03-11 ENCOUNTER — HISTORICAL (OUTPATIENT)
Dept: INFUSION THERAPY | Facility: HOSPITAL | Age: 66
End: 2021-03-11

## 2021-03-11 LAB
ABS NEUT (OLG): 1.84 X10(3)/MCL (ref 2.1–9.2)
ALBUMIN SERPL-MCNC: 3.9 GM/DL (ref 3.4–4.8)
ALBUMIN/GLOB SERPL: 1.3 RATIO (ref 1.1–2)
ALP SERPL-CCNC: 68 UNIT/L (ref 40–150)
ALT SERPL-CCNC: 26 UNIT/L (ref 0–55)
AST SERPL-CCNC: 20 UNIT/L (ref 5–34)
BASOPHILS # BLD AUTO: 0 X10(3)/MCL (ref 0–0.2)
BASOPHILS NFR BLD AUTO: 0.7 %
BILIRUB SERPL-MCNC: 0.6 MG/DL
BILIRUBIN DIRECT+TOT PNL SERPL-MCNC: 0.2 MG/DL (ref 0–0.5)
BILIRUBIN DIRECT+TOT PNL SERPL-MCNC: 0.4 MG/DL (ref 0–0.8)
BUN SERPL-MCNC: 13.5 MG/DL (ref 9.8–20.1)
CALCIUM SERPL-MCNC: 8.8 MG/DL (ref 8.4–10.2)
CHLORIDE SERPL-SCNC: 105 MMOL/L (ref 98–107)
CO2 SERPL-SCNC: 30 MMOL/L (ref 23–31)
CREAT SERPL-MCNC: 0.81 MG/DL (ref 0.55–1.02)
EOSINOPHIL # BLD AUTO: 0.2 X10(3)/MCL (ref 0–0.9)
EOSINOPHIL NFR BLD AUTO: 5 %
ERYTHROCYTE [DISTWIDTH] IN BLOOD BY AUTOMATED COUNT: 12.2 % (ref 11.5–17)
GLOBULIN SER-MCNC: 3.1 GM/DL (ref 2.4–3.5)
GLUCOSE SERPL-MCNC: 104 MG/DL (ref 82–115)
HCT VFR BLD AUTO: 42.3 % (ref 37–47)
HGB BLD-MCNC: 13.9 GM/DL (ref 12–16)
LYMPHOCYTES # BLD AUTO: 0.7 X10(3)/MCL (ref 0.6–4.6)
LYMPHOCYTES NFR BLD AUTO: 22.9 %
MCH RBC QN AUTO: 30.5 PG (ref 27–31)
MCHC RBC AUTO-ENTMCNC: 32.9 GM/DL (ref 33–36)
MCV RBC AUTO: 92.8 FL (ref 80–94)
MONOCYTES # BLD AUTO: 0.3 X10(3)/MCL (ref 0.1–1.3)
MONOCYTES NFR BLD AUTO: 10.3 %
NEUTROPHILS # BLD AUTO: 1.8 X10(3)/MCL (ref 2.1–9.2)
NEUTROPHILS NFR BLD AUTO: 61.1 %
PLATELET # BLD AUTO: 144 X10(3)/MCL (ref 130–400)
PMV BLD AUTO: 8.4 FL (ref 9.4–12.4)
POTASSIUM SERPL-SCNC: 4.4 MMOL/L (ref 3.5–5.1)
PROT SERPL-MCNC: 7 GM/DL (ref 5.8–7.6)
RBC # BLD AUTO: 4.56 X10(6)/MCL (ref 4.2–5.4)
SODIUM SERPL-SCNC: 143 MMOL/L (ref 136–145)
TSH SERPL-ACNC: 1.44 UIU/ML (ref 0.35–4.94)
WBC # SPEC AUTO: 3 X10(3)/MCL (ref 4.5–11.5)

## 2021-04-08 ENCOUNTER — HISTORICAL (OUTPATIENT)
Dept: INFUSION THERAPY | Facility: HOSPITAL | Age: 66
End: 2021-04-08

## 2021-04-08 LAB
ABS NEUT (OLG): 1.48 X10(3)/MCL (ref 2.1–9.2)
ALBUMIN SERPL-MCNC: 3.9 GM/DL (ref 3.4–4.8)
ALBUMIN/GLOB SERPL: 1.3 RATIO (ref 1.1–2)
ALP SERPL-CCNC: 73 UNIT/L (ref 40–150)
ALT SERPL-CCNC: 19 UNIT/L (ref 0–55)
AST SERPL-CCNC: 18 UNIT/L (ref 5–34)
BASOPHILS # BLD AUTO: 0 X10(3)/MCL (ref 0–0.2)
BASOPHILS NFR BLD AUTO: 0.9 %
BILIRUB SERPL-MCNC: 0.5 MG/DL
BILIRUBIN DIRECT+TOT PNL SERPL-MCNC: 0.2 MG/DL (ref 0–0.5)
BILIRUBIN DIRECT+TOT PNL SERPL-MCNC: 0.3 MG/DL (ref 0–0.8)
BUN SERPL-MCNC: 16.8 MG/DL (ref 9.8–20.1)
CALCIUM SERPL-MCNC: 9.3 MG/DL (ref 8.4–10.2)
CHLORIDE SERPL-SCNC: 105 MMOL/L (ref 98–107)
CO2 SERPL-SCNC: 27 MMOL/L (ref 23–31)
CREAT SERPL-MCNC: 0.8 MG/DL (ref 0.55–1.02)
EOSINOPHIL # BLD AUTO: 0.2 X10(3)/MCL (ref 0–0.9)
EOSINOPHIL NFR BLD AUTO: 6.9 %
ERYTHROCYTE [DISTWIDTH] IN BLOOD BY AUTOMATED COUNT: 12.4 % (ref 11.5–17)
GLOBULIN SER-MCNC: 3.1 GM/DL (ref 2.4–3.5)
GLUCOSE SERPL-MCNC: 88 MG/DL (ref 82–115)
HCT VFR BLD AUTO: 42.9 % (ref 37–47)
HGB BLD-MCNC: 14.1 GM/DL (ref 12–16)
LYMPHOCYTES # BLD AUTO: 1.1 X10(3)/MCL (ref 0.6–4.6)
LYMPHOCYTES NFR BLD AUTO: 34.5 %
MCH RBC QN AUTO: 30.3 PG (ref 27–31)
MCHC RBC AUTO-ENTMCNC: 32.9 GM/DL (ref 33–36)
MCV RBC AUTO: 92.1 FL (ref 80–94)
MONOCYTES # BLD AUTO: 0.4 X10(3)/MCL (ref 0.1–1.3)
MONOCYTES NFR BLD AUTO: 11.3 %
NEUTROPHILS # BLD AUTO: 1.5 X10(3)/MCL (ref 2.1–9.2)
NEUTROPHILS NFR BLD AUTO: 46.4 %
PLATELET # BLD AUTO: 151 X10(3)/MCL (ref 130–400)
PMV BLD AUTO: 8.7 FL (ref 9.4–12.4)
POTASSIUM SERPL-SCNC: 4.4 MMOL/L (ref 3.5–5.1)
PROT SERPL-MCNC: 7 GM/DL (ref 5.8–7.6)
RBC # BLD AUTO: 4.66 X10(6)/MCL (ref 4.2–5.4)
SODIUM SERPL-SCNC: 138 MMOL/L (ref 136–145)
TSH SERPL-ACNC: 1.44 UIU/ML (ref 0.35–4.94)
WBC # SPEC AUTO: 3.2 X10(3)/MCL (ref 4.5–11.5)

## 2021-04-14 ENCOUNTER — HISTORICAL (OUTPATIENT)
Dept: RADIOLOGY | Facility: HOSPITAL | Age: 66
End: 2021-04-14

## 2021-05-06 ENCOUNTER — HISTORICAL (OUTPATIENT)
Dept: INFUSION THERAPY | Facility: HOSPITAL | Age: 66
End: 2021-05-06

## 2021-05-06 LAB
ABS NEUT (OLG): 2.52 X10(3)/MCL (ref 2.1–9.2)
ALBUMIN SERPL-MCNC: 3.9 GM/DL (ref 3.4–4.8)
ALBUMIN/GLOB SERPL: 1.2 RATIO (ref 1.1–2)
ALP SERPL-CCNC: 75 UNIT/L (ref 40–150)
ALT SERPL-CCNC: 22 UNIT/L (ref 0–55)
AST SERPL-CCNC: 19 UNIT/L (ref 5–34)
BASOPHILS # BLD AUTO: 0 X10(3)/MCL (ref 0–0.2)
BASOPHILS NFR BLD AUTO: 0.5 %
BILIRUB SERPL-MCNC: 0.6 MG/DL
BILIRUBIN DIRECT+TOT PNL SERPL-MCNC: 0.2 MG/DL (ref 0–0.5)
BILIRUBIN DIRECT+TOT PNL SERPL-MCNC: 0.4 MG/DL (ref 0–0.8)
BUN SERPL-MCNC: 15.2 MG/DL (ref 9.8–20.1)
CALCIUM SERPL-MCNC: 9.6 MG/DL (ref 8.4–10.2)
CHLORIDE SERPL-SCNC: 103 MMOL/L (ref 98–107)
CO2 SERPL-SCNC: 28 MMOL/L (ref 23–31)
CREAT SERPL-MCNC: 0.89 MG/DL (ref 0.55–1.02)
EOSINOPHIL # BLD AUTO: 0.2 X10(3)/MCL (ref 0–0.9)
EOSINOPHIL NFR BLD AUTO: 4.6 %
ERYTHROCYTE [DISTWIDTH] IN BLOOD BY AUTOMATED COUNT: 12.4 % (ref 11.5–17)
GLOBULIN SER-MCNC: 3.3 GM/DL (ref 2.4–3.5)
GLUCOSE SERPL-MCNC: 113 MG/DL (ref 82–115)
HCT VFR BLD AUTO: 43.5 % (ref 37–47)
HGB BLD-MCNC: 14.2 GM/DL (ref 12–16)
LYMPHOCYTES # BLD AUTO: 0.9 X10(3)/MCL (ref 0.6–4.6)
LYMPHOCYTES NFR BLD AUTO: 22.8 %
MCH RBC QN AUTO: 30.3 PG (ref 27–31)
MCHC RBC AUTO-ENTMCNC: 32.6 GM/DL (ref 33–36)
MCV RBC AUTO: 92.9 FL (ref 80–94)
MONOCYTES # BLD AUTO: 0.3 X10(3)/MCL (ref 0.1–1.3)
MONOCYTES NFR BLD AUTO: 8.1 %
NEUTROPHILS # BLD AUTO: 2.5 X10(3)/MCL (ref 2.1–9.2)
NEUTROPHILS NFR BLD AUTO: 64 %
PLATELET # BLD AUTO: 158 X10(3)/MCL (ref 130–400)
PMV BLD AUTO: 9 FL (ref 9.4–12.4)
POTASSIUM SERPL-SCNC: 3.9 MMOL/L (ref 3.5–5.1)
PROT SERPL-MCNC: 7.2 GM/DL (ref 5.8–7.6)
RBC # BLD AUTO: 4.68 X10(6)/MCL (ref 4.2–5.4)
SODIUM SERPL-SCNC: 142 MMOL/L (ref 136–145)
TSH SERPL-ACNC: 1.23 UIU/ML (ref 0.35–4.94)
WBC # SPEC AUTO: 3.9 X10(3)/MCL (ref 4.5–11.5)

## 2021-06-03 ENCOUNTER — HISTORICAL (OUTPATIENT)
Dept: INFUSION THERAPY | Facility: HOSPITAL | Age: 66
End: 2021-06-03

## 2021-06-03 LAB
ABS NEUT (OLG): 2.52 X10(3)/MCL (ref 2.1–9.2)
ALBUMIN SERPL-MCNC: 3.9 GM/DL (ref 3.4–4.8)
ALBUMIN/GLOB SERPL: 1.3 RATIO (ref 1.1–2)
ALP SERPL-CCNC: 73 UNIT/L (ref 40–150)
ALT SERPL-CCNC: 22 UNIT/L (ref 0–55)
AST SERPL-CCNC: 19 UNIT/L (ref 5–34)
BASOPHILS # BLD AUTO: 0 X10(3)/MCL (ref 0–0.2)
BASOPHILS NFR BLD AUTO: 0.5 %
BILIRUB SERPL-MCNC: 0.5 MG/DL
BILIRUBIN DIRECT+TOT PNL SERPL-MCNC: 0.2 MG/DL (ref 0–0.5)
BILIRUBIN DIRECT+TOT PNL SERPL-MCNC: 0.3 MG/DL (ref 0–0.8)
BUN SERPL-MCNC: 16.3 MG/DL (ref 9.8–20.1)
CALCIUM SERPL-MCNC: 9.4 MG/DL (ref 8.4–10.2)
CHLORIDE SERPL-SCNC: 103 MMOL/L (ref 98–107)
CO2 SERPL-SCNC: 28 MMOL/L (ref 23–31)
CREAT SERPL-MCNC: 0.85 MG/DL (ref 0.55–1.02)
EOSINOPHIL # BLD AUTO: 0.3 X10(3)/MCL (ref 0–0.9)
EOSINOPHIL NFR BLD AUTO: 7.3 %
ERYTHROCYTE [DISTWIDTH] IN BLOOD BY AUTOMATED COUNT: 12.4 % (ref 11.5–17)
GLOBULIN SER-MCNC: 3.1 GM/DL (ref 2.4–3.5)
GLUCOSE SERPL-MCNC: 94 MG/DL (ref 82–115)
HCT VFR BLD AUTO: 41.3 % (ref 37–47)
HGB BLD-MCNC: 13.6 GM/DL (ref 12–16)
LYMPHOCYTES # BLD AUTO: 0.8 X10(3)/MCL (ref 0.6–4.6)
LYMPHOCYTES NFR BLD AUTO: 20.3 %
MCH RBC QN AUTO: 30.3 PG (ref 27–31)
MCHC RBC AUTO-ENTMCNC: 32.9 GM/DL (ref 33–36)
MCV RBC AUTO: 92 FL (ref 80–94)
MONOCYTES # BLD AUTO: 0.4 X10(3)/MCL (ref 0.1–1.3)
MONOCYTES NFR BLD AUTO: 9 %
NEUTROPHILS # BLD AUTO: 2.5 X10(3)/MCL (ref 2.1–9.2)
NEUTROPHILS NFR BLD AUTO: 62.9 %
PLATELET # BLD AUTO: 153 X10(3)/MCL (ref 130–400)
PMV BLD AUTO: 8.8 FL (ref 9.4–12.4)
POTASSIUM SERPL-SCNC: 4.2 MMOL/L (ref 3.5–5.1)
PROT SERPL-MCNC: 7 GM/DL (ref 5.8–7.6)
RBC # BLD AUTO: 4.49 X10(6)/MCL (ref 4.2–5.4)
SODIUM SERPL-SCNC: 142 MMOL/L (ref 136–145)
TSH SERPL-ACNC: 1.13 UIU/ML (ref 0.35–4.94)
WBC # SPEC AUTO: 4 X10(3)/MCL (ref 4.5–11.5)

## 2021-07-01 ENCOUNTER — HISTORICAL (OUTPATIENT)
Dept: INFUSION THERAPY | Facility: HOSPITAL | Age: 66
End: 2021-07-01

## 2021-07-01 LAB
ABS NEUT (OLG): 2.12 X10(3)/MCL (ref 2.1–9.2)
ALBUMIN SERPL-MCNC: 3.6 GM/DL (ref 3.4–4.8)
ALBUMIN/GLOB SERPL: 1 RATIO (ref 1.1–2)
ALP SERPL-CCNC: 72 UNIT/L (ref 40–150)
ALT SERPL-CCNC: 21 UNIT/L (ref 0–55)
AST SERPL-CCNC: 19 UNIT/L (ref 5–34)
BASOPHILS # BLD AUTO: 0 X10(3)/MCL (ref 0–0.2)
BASOPHILS NFR BLD AUTO: 0.6 %
BILIRUB SERPL-MCNC: 0.5 MG/DL
BILIRUBIN DIRECT+TOT PNL SERPL-MCNC: 0.2 MG/DL (ref 0–0.5)
BILIRUBIN DIRECT+TOT PNL SERPL-MCNC: 0.3 MG/DL (ref 0–0.8)
BUN SERPL-MCNC: 14.9 MG/DL (ref 9.8–20.1)
CALCIUM SERPL-MCNC: 9.4 MG/DL (ref 8.4–10.2)
CHLORIDE SERPL-SCNC: 105 MMOL/L (ref 98–107)
CO2 SERPL-SCNC: 26 MMOL/L (ref 23–31)
CREAT SERPL-MCNC: 0.85 MG/DL (ref 0.55–1.02)
EOSINOPHIL # BLD AUTO: 0.2 X10(3)/MCL (ref 0–0.9)
EOSINOPHIL NFR BLD AUTO: 4.3 %
ERYTHROCYTE [DISTWIDTH] IN BLOOD BY AUTOMATED COUNT: 12.5 % (ref 11.5–17)
GLOBULIN SER-MCNC: 3.6 GM/DL (ref 2.4–3.5)
GLUCOSE SERPL-MCNC: 111 MG/DL (ref 82–115)
HCT VFR BLD AUTO: 41.3 % (ref 37–47)
HGB BLD-MCNC: 13.9 GM/DL (ref 12–16)
LYMPHOCYTES # BLD AUTO: 0.8 X10(3)/MCL (ref 0.6–4.6)
LYMPHOCYTES NFR BLD AUTO: 24.1 %
MCH RBC QN AUTO: 30.5 PG (ref 27–31)
MCHC RBC AUTO-ENTMCNC: 33.7 GM/DL (ref 33–36)
MCV RBC AUTO: 90.8 FL (ref 80–94)
MONOCYTES # BLD AUTO: 0.4 X10(3)/MCL (ref 0.1–1.3)
MONOCYTES NFR BLD AUTO: 10.1 %
NEUTROPHILS # BLD AUTO: 2.1 X10(3)/MCL (ref 2.1–9.2)
NEUTROPHILS NFR BLD AUTO: 60.9 %
PLATELET # BLD AUTO: 143 X10(3)/MCL (ref 130–400)
PMV BLD AUTO: 8.7 FL (ref 9.4–12.4)
POTASSIUM SERPL-SCNC: 4.2 MMOL/L (ref 3.5–5.1)
PROT SERPL-MCNC: 7.2 GM/DL (ref 5.8–7.6)
RBC # BLD AUTO: 4.55 X10(6)/MCL (ref 4.2–5.4)
SODIUM SERPL-SCNC: 139 MMOL/L (ref 136–145)
TSH SERPL-ACNC: 1.39 UIU/ML (ref 0.35–4.94)
WBC # SPEC AUTO: 3.5 X10(3)/MCL (ref 4.5–11.5)

## 2021-07-29 ENCOUNTER — HISTORICAL (OUTPATIENT)
Dept: INFUSION THERAPY | Facility: HOSPITAL | Age: 66
End: 2021-07-29

## 2021-07-29 LAB
ABS NEUT (OLG): 2.32 X10(3)/MCL (ref 2.1–9.2)
ALBUMIN SERPL-MCNC: 3.6 GM/DL (ref 3.4–4.8)
ALBUMIN/GLOB SERPL: 1 RATIO (ref 1.1–2)
ALP SERPL-CCNC: 75 UNIT/L (ref 40–150)
ALT SERPL-CCNC: 24 UNIT/L (ref 0–55)
AST SERPL-CCNC: 21 UNIT/L (ref 5–34)
BASOPHILS # BLD AUTO: 0 X10(3)/MCL (ref 0–0.2)
BASOPHILS NFR BLD AUTO: 0.5 %
BILIRUB SERPL-MCNC: 0.7 MG/DL
BILIRUBIN DIRECT+TOT PNL SERPL-MCNC: 0.2 MG/DL (ref 0–0.5)
BILIRUBIN DIRECT+TOT PNL SERPL-MCNC: 0.5 MG/DL (ref 0–0.8)
BUN SERPL-MCNC: 16 MG/DL (ref 9.8–20.1)
CALCIUM SERPL-MCNC: 9.2 MG/DL (ref 8.4–10.2)
CHLORIDE SERPL-SCNC: 107 MMOL/L (ref 98–107)
CO2 SERPL-SCNC: 23 MMOL/L (ref 23–31)
CREAT SERPL-MCNC: 0.8 MG/DL (ref 0.55–1.02)
EOSINOPHIL # BLD AUTO: 0.2 X10(3)/MCL (ref 0–0.9)
EOSINOPHIL NFR BLD AUTO: 5.2 %
ERYTHROCYTE [DISTWIDTH] IN BLOOD BY AUTOMATED COUNT: 12.4 % (ref 11.5–17)
GLOBULIN SER-MCNC: 3.5 GM/DL (ref 2.4–3.5)
GLUCOSE SERPL-MCNC: 96 MG/DL (ref 82–115)
HCT VFR BLD AUTO: 42 % (ref 37–47)
HGB BLD-MCNC: 14 GM/DL (ref 12–16)
LYMPHOCYTES # BLD AUTO: 1 X10(3)/MCL (ref 0.6–4.6)
LYMPHOCYTES NFR BLD AUTO: 25.7 %
MCH RBC QN AUTO: 30.4 PG (ref 27–31)
MCHC RBC AUTO-ENTMCNC: 33.3 GM/DL (ref 33–36)
MCV RBC AUTO: 91.1 FL (ref 80–94)
MONOCYTES # BLD AUTO: 0.4 X10(3)/MCL (ref 0.1–1.3)
MONOCYTES NFR BLD AUTO: 10.9 %
NEUTROPHILS # BLD AUTO: 2.3 X10(3)/MCL (ref 2.1–9.2)
NEUTROPHILS NFR BLD AUTO: 57.2 %
PLATELET # BLD AUTO: 150 X10(3)/MCL (ref 130–400)
PMV BLD AUTO: 8.7 FL (ref 9.4–12.4)
POTASSIUM SERPL-SCNC: 4.3 MMOL/L (ref 3.5–5.1)
PROT SERPL-MCNC: 7.1 GM/DL (ref 5.8–7.6)
RBC # BLD AUTO: 4.61 X10(6)/MCL (ref 4.2–5.4)
SODIUM SERPL-SCNC: 139 MMOL/L (ref 136–145)
TSH SERPL-ACNC: 1.64 UIU/ML (ref 0.35–4.94)
WBC # SPEC AUTO: 4 X10(3)/MCL (ref 4.5–11.5)

## 2021-08-26 ENCOUNTER — HISTORICAL (OUTPATIENT)
Dept: INFUSION THERAPY | Facility: HOSPITAL | Age: 66
End: 2021-08-26

## 2021-08-26 LAB
ABS NEUT (OLG): 2.19 X10(3)/MCL (ref 2.1–9.2)
ALBUMIN SERPL-MCNC: 3.6 GM/DL (ref 3.4–4.8)
ALBUMIN/GLOB SERPL: 1.1 RATIO (ref 1.1–2)
ALP SERPL-CCNC: 73 UNIT/L (ref 40–150)
ALT SERPL-CCNC: 24 UNIT/L (ref 0–55)
AST SERPL-CCNC: 19 UNIT/L (ref 5–34)
BASOPHILS # BLD AUTO: 0 X10(3)/MCL (ref 0–0.2)
BASOPHILS NFR BLD AUTO: 0.2 %
BILIRUB SERPL-MCNC: 0.5 MG/DL
BILIRUBIN DIRECT+TOT PNL SERPL-MCNC: 0.2 MG/DL (ref 0–0.5)
BILIRUBIN DIRECT+TOT PNL SERPL-MCNC: 0.3 MG/DL (ref 0–0.8)
BUN SERPL-MCNC: 15.8 MG/DL (ref 9.8–20.1)
CALCIUM SERPL-MCNC: 9.2 MG/DL (ref 8.4–10.2)
CHLORIDE SERPL-SCNC: 106 MMOL/L (ref 98–107)
CO2 SERPL-SCNC: 26 MMOL/L (ref 23–31)
CREAT SERPL-MCNC: 0.83 MG/DL (ref 0.55–1.02)
EOSINOPHIL # BLD AUTO: 0.4 X10(3)/MCL (ref 0–0.9)
EOSINOPHIL NFR BLD AUTO: 10 %
ERYTHROCYTE [DISTWIDTH] IN BLOOD BY AUTOMATED COUNT: 12.4 % (ref 11.5–17)
GLOBULIN SER-MCNC: 3.3 GM/DL (ref 2.4–3.5)
GLUCOSE SERPL-MCNC: 96 MG/DL (ref 82–115)
HCT VFR BLD AUTO: 42.1 % (ref 37–47)
HGB BLD-MCNC: 13.4 GM/DL (ref 12–16)
LYMPHOCYTES # BLD AUTO: 1 X10(3)/MCL (ref 0.6–4.6)
LYMPHOCYTES NFR BLD AUTO: 24.1 %
MCH RBC QN AUTO: 30 PG (ref 27–31)
MCHC RBC AUTO-ENTMCNC: 31.8 GM/DL (ref 33–36)
MCV RBC AUTO: 94.2 FL (ref 80–94)
MONOCYTES # BLD AUTO: 0.4 X10(3)/MCL (ref 0.1–1.3)
MONOCYTES NFR BLD AUTO: 10.9 %
NEUTROPHILS # BLD AUTO: 2.2 X10(3)/MCL (ref 2.1–9.2)
NEUTROPHILS NFR BLD AUTO: 54.6 %
PLATELET # BLD AUTO: 144 X10(3)/MCL (ref 130–400)
PMV BLD AUTO: 8.8 FL (ref 9.4–12.4)
POTASSIUM SERPL-SCNC: 4.2 MMOL/L (ref 3.5–5.1)
PROT SERPL-MCNC: 6.9 GM/DL (ref 5.8–7.6)
RBC # BLD AUTO: 4.47 X10(6)/MCL (ref 4.2–5.4)
SODIUM SERPL-SCNC: 141 MMOL/L (ref 136–145)
TSH SERPL-ACNC: 1.5 UIU/ML (ref 0.35–4.94)
WBC # SPEC AUTO: 4 X10(3)/MCL (ref 4.5–11.5)

## 2021-09-23 ENCOUNTER — HISTORICAL (OUTPATIENT)
Dept: INFUSION THERAPY | Facility: HOSPITAL | Age: 66
End: 2021-09-23

## 2021-09-23 LAB
ABS NEUT (OLG): 2.15 X10(3)/MCL (ref 2.1–9.2)
ALBUMIN SERPL-MCNC: 3.8 GM/DL (ref 3.4–4.8)
ALBUMIN/GLOB SERPL: 1.2 RATIO (ref 1.1–2)
ALP SERPL-CCNC: 68 UNIT/L (ref 40–150)
ALT SERPL-CCNC: 17 UNIT/L (ref 0–55)
AST SERPL-CCNC: 18 UNIT/L (ref 5–34)
BASOPHILS # BLD AUTO: 0 X10(3)/MCL (ref 0–0.2)
BASOPHILS NFR BLD AUTO: 0.5 %
BILIRUB SERPL-MCNC: 0.8 MG/DL
BILIRUBIN DIRECT+TOT PNL SERPL-MCNC: 0.3 MG/DL (ref 0–0.5)
BILIRUBIN DIRECT+TOT PNL SERPL-MCNC: 0.5 MG/DL (ref 0–0.8)
BUN SERPL-MCNC: 20 MG/DL (ref 9.8–20.1)
CALCIUM SERPL-MCNC: 9.3 MG/DL (ref 8.4–10.2)
CHLORIDE SERPL-SCNC: 106 MMOL/L (ref 98–107)
CO2 SERPL-SCNC: 24 MMOL/L (ref 23–31)
CREAT SERPL-MCNC: 0.82 MG/DL (ref 0.55–1.02)
EOSINOPHIL # BLD AUTO: 0.2 X10(3)/MCL (ref 0–0.9)
EOSINOPHIL NFR BLD AUTO: 4.3 %
ERYTHROCYTE [DISTWIDTH] IN BLOOD BY AUTOMATED COUNT: 12.3 % (ref 11.5–17)
GLOBULIN SER-MCNC: 3.3 GM/DL (ref 2.4–3.5)
GLUCOSE SERPL-MCNC: 100 MG/DL (ref 82–115)
HCT VFR BLD AUTO: 41.8 % (ref 37–47)
HGB BLD-MCNC: 13.6 GM/DL (ref 12–16)
LYMPHOCYTES # BLD AUTO: 1 X10(3)/MCL (ref 0.6–4.6)
LYMPHOCYTES NFR BLD AUTO: 28 %
MCH RBC QN AUTO: 30.1 PG (ref 27–31)
MCHC RBC AUTO-ENTMCNC: 32.5 GM/DL (ref 33–36)
MCV RBC AUTO: 92.5 FL (ref 80–94)
MONOCYTES # BLD AUTO: 0.4 X10(3)/MCL (ref 0.1–1.3)
MONOCYTES NFR BLD AUTO: 9.9 %
NEUTROPHILS # BLD AUTO: 2.2 X10(3)/MCL (ref 2.1–9.2)
NEUTROPHILS NFR BLD AUTO: 57.3 %
PLATELET # BLD AUTO: 168 X10(3)/MCL (ref 130–400)
PMV BLD AUTO: 8.8 FL (ref 9.4–12.4)
POTASSIUM SERPL-SCNC: 4.3 MMOL/L (ref 3.5–5.1)
PROT SERPL-MCNC: 7.1 GM/DL (ref 5.8–7.6)
RBC # BLD AUTO: 4.52 X10(6)/MCL (ref 4.2–5.4)
SODIUM SERPL-SCNC: 142 MMOL/L (ref 136–145)
TSH SERPL-ACNC: 1.29 UIU/ML (ref 0.35–4.94)
WBC # SPEC AUTO: 3.8 X10(3)/MCL (ref 4.5–11.5)

## 2021-10-11 ENCOUNTER — HISTORICAL (OUTPATIENT)
Dept: RADIOLOGY | Facility: HOSPITAL | Age: 66
End: 2021-10-11

## 2021-10-21 ENCOUNTER — HISTORICAL (OUTPATIENT)
Dept: INFUSION THERAPY | Facility: HOSPITAL | Age: 66
End: 2021-10-21

## 2021-10-21 LAB
ABS NEUT (OLG): 2.66 X10(3)/MCL (ref 2.1–9.2)
ALBUMIN SERPL-MCNC: 3.8 GM/DL (ref 3.4–4.8)
ALBUMIN/GLOB SERPL: 1.2 RATIO (ref 1.1–2)
ALP SERPL-CCNC: 68 UNIT/L (ref 40–150)
ALT SERPL-CCNC: 20 UNIT/L (ref 0–55)
AST SERPL-CCNC: 22 UNIT/L (ref 5–34)
BASOPHILS # BLD AUTO: 0 X10(3)/MCL (ref 0–0.2)
BASOPHILS NFR BLD AUTO: 0.4 %
BILIRUB SERPL-MCNC: 0.6 MG/DL
BILIRUBIN DIRECT+TOT PNL SERPL-MCNC: 0.2 MG/DL (ref 0–0.5)
BILIRUBIN DIRECT+TOT PNL SERPL-MCNC: 0.4 MG/DL (ref 0–0.8)
BUN SERPL-MCNC: 20.2 MG/DL (ref 9.8–20.1)
CALCIUM SERPL-MCNC: 9.2 MG/DL (ref 8.7–10.5)
CHLORIDE SERPL-SCNC: 108 MMOL/L (ref 98–107)
CO2 SERPL-SCNC: 25 MMOL/L (ref 23–31)
CREAT SERPL-MCNC: 0.79 MG/DL (ref 0.55–1.02)
EOSINOPHIL # BLD AUTO: 0.2 X10(3)/MCL (ref 0–0.9)
EOSINOPHIL NFR BLD AUTO: 5.3 %
ERYTHROCYTE [DISTWIDTH] IN BLOOD BY AUTOMATED COUNT: 12.7 % (ref 11.5–17)
GLOBULIN SER-MCNC: 3.3 GM/DL (ref 2.4–3.5)
GLUCOSE SERPL-MCNC: 93 MG/DL (ref 82–115)
HCT VFR BLD AUTO: 42.8 % (ref 37–47)
HGB BLD-MCNC: 13.9 GM/DL (ref 12–16)
LYMPHOCYTES # BLD AUTO: 1.1 X10(3)/MCL (ref 0.6–4.6)
LYMPHOCYTES NFR BLD AUTO: 24.5 %
MCH RBC QN AUTO: 30.1 PG (ref 27–31)
MCHC RBC AUTO-ENTMCNC: 32.5 GM/DL (ref 33–36)
MCV RBC AUTO: 92.6 FL (ref 80–94)
MONOCYTES # BLD AUTO: 0.5 X10(3)/MCL (ref 0.1–1.3)
MONOCYTES NFR BLD AUTO: 10.5 %
NEUTROPHILS # BLD AUTO: 2.7 X10(3)/MCL (ref 2.1–9.2)
NEUTROPHILS NFR BLD AUTO: 59.3 %
PLATELET # BLD AUTO: 157 X10(3)/MCL (ref 130–400)
PMV BLD AUTO: 8.6 FL (ref 9.4–12.4)
POTASSIUM SERPL-SCNC: 4.4 MMOL/L (ref 3.5–5.1)
PROT SERPL-MCNC: 7.1 GM/DL (ref 5.8–7.6)
RBC # BLD AUTO: 4.62 X10(6)/MCL (ref 4.2–5.4)
SODIUM SERPL-SCNC: 141 MMOL/L (ref 136–145)
TSH SERPL-ACNC: 1.51 UIU/ML (ref 0.35–4.94)
WBC # SPEC AUTO: 4.5 X10(3)/MCL (ref 4.5–11.5)

## 2021-11-18 ENCOUNTER — HISTORICAL (OUTPATIENT)
Dept: INFUSION THERAPY | Facility: HOSPITAL | Age: 66
End: 2021-11-18

## 2021-11-18 LAB
ABS NEUT (OLG): 1.97 X10(3)/MCL (ref 2.1–9.2)
ALBUMIN SERPL-MCNC: 3.8 GM/DL (ref 3.4–4.8)
ALBUMIN/GLOB SERPL: 1.1 RATIO (ref 1.1–2)
ALP SERPL-CCNC: 68 UNIT/L (ref 40–150)
ALT SERPL-CCNC: 22 UNIT/L (ref 0–55)
AST SERPL-CCNC: 19 UNIT/L (ref 5–34)
BASOPHILS # BLD AUTO: 0 X10(3)/MCL (ref 0–0.2)
BASOPHILS NFR BLD AUTO: 0.6 %
BILIRUB SERPL-MCNC: 0.7 MG/DL
BILIRUBIN DIRECT+TOT PNL SERPL-MCNC: 0.2 MG/DL (ref 0–0.5)
BILIRUBIN DIRECT+TOT PNL SERPL-MCNC: 0.5 MG/DL (ref 0–0.8)
BUN SERPL-MCNC: 16.5 MG/DL (ref 9.8–20.1)
CALCIUM SERPL-MCNC: 9.4 MG/DL (ref 8.7–10.5)
CHLORIDE SERPL-SCNC: 107 MMOL/L (ref 98–107)
CO2 SERPL-SCNC: 25 MMOL/L (ref 23–31)
CREAT SERPL-MCNC: 0.77 MG/DL (ref 0.55–1.02)
EOSINOPHIL # BLD AUTO: 0.2 X10(3)/MCL (ref 0–0.9)
EOSINOPHIL NFR BLD AUTO: 6.1 %
ERYTHROCYTE [DISTWIDTH] IN BLOOD BY AUTOMATED COUNT: 12.5 % (ref 11.5–17)
GLOBULIN SER-MCNC: 3.5 GM/DL (ref 2.4–3.5)
GLUCOSE SERPL-MCNC: 96 MG/DL (ref 82–115)
HCT VFR BLD AUTO: 43.2 % (ref 37–47)
HGB BLD-MCNC: 14.2 GM/DL (ref 12–16)
LYMPHOCYTES # BLD AUTO: 1 X10(3)/MCL (ref 0.6–4.6)
LYMPHOCYTES NFR BLD AUTO: 27.9 %
MCH RBC QN AUTO: 30.1 PG (ref 27–31)
MCHC RBC AUTO-ENTMCNC: 32.9 GM/DL (ref 33–36)
MCV RBC AUTO: 91.5 FL (ref 80–94)
MONOCYTES # BLD AUTO: 0.4 X10(3)/MCL (ref 0.1–1.3)
MONOCYTES NFR BLD AUTO: 10.6 %
NEUTROPHILS # BLD AUTO: 2 X10(3)/MCL (ref 2.1–9.2)
NEUTROPHILS NFR BLD AUTO: 54.8 %
PLATELET # BLD AUTO: 140 X10(3)/MCL (ref 130–400)
PMV BLD AUTO: 8.7 FL (ref 9.4–12.4)
POTASSIUM SERPL-SCNC: 4.5 MMOL/L (ref 3.5–5.1)
PROT SERPL-MCNC: 7.3 GM/DL (ref 5.8–7.6)
RBC # BLD AUTO: 4.72 X10(6)/MCL (ref 4.2–5.4)
SODIUM SERPL-SCNC: 140 MMOL/L (ref 136–145)
TSH SERPL-ACNC: 1.38 UIU/ML (ref 0.35–4.94)
WBC # SPEC AUTO: 3.6 X10(3)/MCL (ref 4.5–11.5)

## 2021-12-16 ENCOUNTER — HISTORICAL (OUTPATIENT)
Dept: INFUSION THERAPY | Facility: HOSPITAL | Age: 66
End: 2021-12-16

## 2021-12-16 LAB
ABS NEUT (OLG): 1.9 X10(3)/MCL (ref 2.1–9.2)
ALBUMIN SERPL-MCNC: 3.8 GM/DL (ref 3.4–4.8)
ALBUMIN/GLOB SERPL: 1.2 RATIO (ref 1.1–2)
ALP SERPL-CCNC: 64 UNIT/L (ref 40–150)
ALT SERPL-CCNC: 24 UNIT/L (ref 0–55)
AST SERPL-CCNC: 19 UNIT/L (ref 5–34)
BASOPHILS # BLD AUTO: 0 X10(3)/MCL (ref 0–0.2)
BASOPHILS NFR BLD AUTO: 0.3 %
BILIRUB SERPL-MCNC: 0.6 MG/DL
BILIRUBIN DIRECT+TOT PNL SERPL-MCNC: 0.2 MG/DL (ref 0–0.5)
BILIRUBIN DIRECT+TOT PNL SERPL-MCNC: 0.4 MG/DL (ref 0–0.8)
BUN SERPL-MCNC: 17.1 MG/DL (ref 9.8–20.1)
CALCIUM SERPL-MCNC: 9.1 MG/DL (ref 8.7–10.5)
CHLORIDE SERPL-SCNC: 106 MMOL/L (ref 98–107)
CO2 SERPL-SCNC: 29 MMOL/L (ref 23–31)
CREAT SERPL-MCNC: 0.76 MG/DL (ref 0.55–1.02)
EOSINOPHIL # BLD AUTO: 0.3 X10(3)/MCL (ref 0–0.9)
EOSINOPHIL NFR BLD AUTO: 7.2 %
ERYTHROCYTE [DISTWIDTH] IN BLOOD BY AUTOMATED COUNT: 12.6 % (ref 11.5–17)
EST CREAT CLEARANCE SER (OHS): 67.5 ML/MIN
GLOBULIN SER-MCNC: 3.2 GM/DL (ref 2.4–3.5)
GLUCOSE SERPL-MCNC: 91 MG/DL (ref 82–115)
HCT VFR BLD AUTO: 42.1 % (ref 37–47)
HGB BLD-MCNC: 13.9 GM/DL (ref 12–16)
LYMPHOCYTES # BLD AUTO: 1 X10(3)/MCL (ref 0.6–4.6)
LYMPHOCYTES NFR BLD AUTO: 28.3 %
MCH RBC QN AUTO: 30.5 PG (ref 27–31)
MCHC RBC AUTO-ENTMCNC: 33 GM/DL (ref 33–36)
MCV RBC AUTO: 92.3 FL (ref 80–94)
MONOCYTES # BLD AUTO: 0.4 X10(3)/MCL (ref 0.1–1.3)
MONOCYTES NFR BLD AUTO: 11.4 %
NEUTROPHILS # BLD AUTO: 1.9 X10(3)/MCL (ref 2.1–9.2)
NEUTROPHILS NFR BLD AUTO: 52.8 %
PLATELET # BLD AUTO: 155 X10(3)/MCL (ref 130–400)
PMV BLD AUTO: 8.9 FL (ref 9.4–12.4)
POTASSIUM SERPL-SCNC: 4.6 MMOL/L (ref 3.5–5.1)
PROT SERPL-MCNC: 7 GM/DL (ref 5.8–7.6)
RBC # BLD AUTO: 4.56 X10(6)/MCL (ref 4.2–5.4)
SODIUM SERPL-SCNC: 141 MMOL/L (ref 136–145)
TSH SERPL-ACNC: 1.51 UIU/ML (ref 0.35–4.94)
WBC # SPEC AUTO: 3.6 X10(3)/MCL (ref 4.5–11.5)

## 2022-01-13 ENCOUNTER — HISTORICAL (OUTPATIENT)
Dept: INFUSION THERAPY | Facility: HOSPITAL | Age: 67
End: 2022-01-13

## 2022-02-10 ENCOUNTER — HISTORICAL (OUTPATIENT)
Dept: INFUSION THERAPY | Facility: HOSPITAL | Age: 67
End: 2022-02-10

## 2022-02-10 LAB
ABS NEUT (OLG): 1.67 (ref 2.1–9.2)
ALBUMIN SERPL-MCNC: 3.8 G/DL (ref 3.4–4.8)
ALBUMIN/GLOB SERPL: 1.1 {RATIO} (ref 1.1–2)
ALP SERPL-CCNC: 91 U/L (ref 40–150)
ALT SERPL-CCNC: 21 U/L (ref 0–55)
AST SERPL-CCNC: 18 U/L (ref 5–34)
BASOPHILS # BLD AUTO: 0 10*3/UL (ref 0–0.2)
BASOPHILS NFR BLD AUTO: 0.3 %
BILIRUB SERPL-MCNC: 0.7 MG/DL
BILIRUBIN DIRECT+TOT PNL SERPL-MCNC: 0.3 (ref 0–0.5)
BILIRUBIN DIRECT+TOT PNL SERPL-MCNC: 0.4 (ref 0–0.8)
BUN SERPL-MCNC: 16.4 MG/DL (ref 9.8–20.1)
CALCIUM SERPL-MCNC: 9.2 MG/DL (ref 8.7–10.5)
CHLORIDE SERPL-SCNC: 105 MMOL/L (ref 98–107)
CO2 SERPL-SCNC: 28 MMOL/L (ref 23–31)
CREAT SERPL-MCNC: 0.83 MG/DL (ref 0.55–1.02)
EOSINOPHIL # BLD AUTO: 0.1 10*3/UL (ref 0–0.9)
EOSINOPHIL NFR BLD AUTO: 3.7 %
ERYTHROCYTE [DISTWIDTH] IN BLOOD BY AUTOMATED COUNT: 12.5 % (ref 11.5–17)
GLOBULIN SER-MCNC: 3.4 G/DL (ref 2.4–3.5)
GLUCOSE SERPL-MCNC: 82 MG/DL (ref 82–115)
HCT VFR BLD AUTO: 43.5 % (ref 37–47)
HEMOLYSIS INTERF INDEX SERPL-ACNC: 7
HGB BLD-MCNC: 14 G/DL (ref 12–16)
ICTERIC INTERF INDEX SERPL-ACNC: 1
LIPEMIC INTERF INDEX SERPL-ACNC: 5
LYMPHOCYTES # BLD AUTO: 0.8 10*3/UL (ref 0.6–4.6)
LYMPHOCYTES NFR BLD AUTO: 28.2 %
MANUAL DIFF? (OHS): NO
MCH RBC QN AUTO: 30 PG (ref 27–31)
MCHC RBC AUTO-ENTMCNC: 32.2 G/DL (ref 33–36)
MCV RBC AUTO: 93.3 FL (ref 80–94)
MONOCYTES # BLD AUTO: 0.3 10*3/UL (ref 0.1–1.3)
MONOCYTES NFR BLD AUTO: 10.9 %
NEUTROPHILS # BLD AUTO: 1.7 10*3/UL (ref 2.1–9.2)
NEUTROPHILS NFR BLD AUTO: 56.9 %
PLATELET # BLD AUTO: 155 10*3/UL (ref 130–400)
PMV BLD AUTO: 9.1 FL (ref 9.4–12.4)
POTASSIUM SERPL-SCNC: 4.2 MMOL/L (ref 3.5–5.1)
PROT SERPL-MCNC: 7.2 G/DL (ref 5.8–7.6)
RBC # BLD AUTO: 4.66 10*6/UL (ref 4.2–5.4)
SODIUM SERPL-SCNC: 139 MMOL/L (ref 136–145)
TSH SERPL-ACNC: 1.48 M[IU]/L (ref 0.35–4.94)
WBC # SPEC AUTO: 2.9 10*3/UL (ref 4.5–11.5)

## 2022-02-24 ENCOUNTER — HISTORICAL (OUTPATIENT)
Dept: ADMINISTRATIVE | Facility: HOSPITAL | Age: 67
End: 2022-02-24

## 2022-02-24 LAB
ABS NEUT (OLG): 1.2 (ref 2.1–9.2)
ALBUMIN SERPL-MCNC: 3.6 G/DL (ref 3.4–4.8)
ALBUMIN/GLOB SERPL: 1.2 {RATIO} (ref 1.1–2)
ALP SERPL-CCNC: 62 U/L (ref 40–150)
ALT SERPL-CCNC: 20 U/L (ref 0–55)
AST SERPL-CCNC: 18 U/L (ref 5–34)
BASOPHILS # BLD AUTO: 0 10*3/UL (ref 0–0.2)
BASOPHILS NFR BLD AUTO: 1 %
BILIRUB SERPL-MCNC: 0.7 MG/DL
BILIRUBIN DIRECT+TOT PNL SERPL-MCNC: 0.2 (ref 0–0.5)
BILIRUBIN DIRECT+TOT PNL SERPL-MCNC: 0.5 (ref 0–0.8)
BUN SERPL-MCNC: 16.5 MG/DL (ref 9.8–20.1)
CALCIUM SERPL-MCNC: 9.2 MG/DL (ref 8.7–10.5)
CHLORIDE SERPL-SCNC: 107 MMOL/L (ref 98–107)
CHOLEST SERPL-MCNC: 185 MG/DL
CHOLEST/HDLC SERPL: 3 {RATIO} (ref 0–5)
CO2 SERPL-SCNC: 27 MMOL/L (ref 23–31)
CREAT SERPL-MCNC: 0.77 MG/DL (ref 0.55–1.02)
DEPRECATED CALCIDIOL+CALCIFEROL SERPL-MC: 42.1 NG/ML (ref 30–80)
EOSINOPHIL # BLD AUTO: 0.1 10*3/UL (ref 0–0.9)
EOSINOPHIL NFR BLD AUTO: 4 %
ERYTHROCYTE [DISTWIDTH] IN BLOOD BY AUTOMATED COUNT: 12.5 % (ref 11.5–17)
GLOBULIN SER-MCNC: 3 G/DL (ref 2.4–3.5)
GLUCOSE SERPL-MCNC: 95 MG/DL (ref 82–115)
HCT VFR BLD AUTO: 42.7 % (ref 37–47)
HDLC SERPL-MCNC: 53 MG/DL (ref 35–60)
HEMOLYSIS INTERF INDEX SERPL-ACNC: 5
HGB BLD-MCNC: 13.9 G/DL (ref 12–16)
ICTERIC INTERF INDEX SERPL-ACNC: 1
LDLC SERPL CALC-MCNC: 116 MG/DL (ref 50–140)
LIPEMIC INTERF INDEX SERPL-ACNC: <0
LYMPHOCYTES # BLD AUTO: 0.8 10*3/UL (ref 0.6–4.6)
LYMPHOCYTES NFR BLD AUTO: 34 %
MANUAL DIFF? (OHS): NO
MCH RBC QN AUTO: 30.3 PG (ref 27–31)
MCHC RBC AUTO-ENTMCNC: 32.6 G/DL (ref 33–36)
MCV RBC AUTO: 93.2 FL (ref 80–94)
MONOCYTES # BLD AUTO: 0.3 10*3/UL (ref 0.1–1.3)
MONOCYTES NFR BLD AUTO: 12 %
NEUTROPHILS # BLD AUTO: 1.2 10*3/UL (ref 2.1–9.2)
NEUTROPHILS NFR BLD AUTO: 49 %
PLATELET # BLD AUTO: 160 10*3/UL (ref 130–400)
PMV BLD AUTO: 9.1 FL (ref 9.4–12.4)
POS ERR2 (OHS): NORMAL
POTASSIUM SERPL-SCNC: 4.3 MMOL/L (ref 3.5–5.1)
PROT SERPL-MCNC: 6.6 G/DL (ref 5.8–7.6)
RBC # BLD AUTO: 4.58 10*6/UL (ref 4.2–5.4)
SODIUM SERPL-SCNC: 141 MMOL/L (ref 136–145)
TRIGL SERPL-MCNC: 78 MG/DL (ref 37–140)
TSH SERPL-ACNC: 1.64 M[IU]/L (ref 0.35–4.94)
VLDLC SERPL CALC-MCNC: 16 MG/DL
WBC # SPEC AUTO: 2.5 10*3/UL (ref 4.5–11.5)

## 2022-03-09 ENCOUNTER — HISTORICAL (OUTPATIENT)
Dept: INFUSION THERAPY | Facility: HOSPITAL | Age: 67
End: 2022-03-09

## 2022-03-09 LAB
ABS NEUT (OLG): 2.94 (ref 2.1–9.2)
ALBUMIN SERPL-MCNC: 4 G/DL (ref 3.4–4.8)
ALBUMIN/GLOB SERPL: 1.2 {RATIO} (ref 1.1–2)
ALP SERPL-CCNC: 62 U/L (ref 40–150)
ALT SERPL-CCNC: 19 U/L (ref 0–55)
AST SERPL-CCNC: 19 U/L (ref 5–34)
BASOPHILS # BLD AUTO: 0 10*3/UL (ref 0–0.2)
BASOPHILS NFR BLD AUTO: 0.4 %
BILIRUB SERPL-MCNC: 0.5 MG/DL
BILIRUBIN DIRECT+TOT PNL SERPL-MCNC: 0.2 (ref 0–0.5)
BILIRUBIN DIRECT+TOT PNL SERPL-MCNC: 0.3 (ref 0–0.8)
BUN SERPL-MCNC: 21.2 MG/DL (ref 9.8–20.1)
CALCIUM SERPL-MCNC: 9.5 MG/DL (ref 8.7–10.5)
CHLORIDE SERPL-SCNC: 106 MMOL/L (ref 98–107)
CO2 SERPL-SCNC: 26 MMOL/L (ref 23–31)
CREAT SERPL-MCNC: 0.82 MG/DL (ref 0.55–1.02)
EOSINOPHIL # BLD AUTO: 0.1 10*3/UL (ref 0–0.9)
EOSINOPHIL NFR BLD AUTO: 2.6 %
ERYTHROCYTE [DISTWIDTH] IN BLOOD BY AUTOMATED COUNT: 12.4 % (ref 11.5–17)
GLOBULIN SER-MCNC: 3.4 G/DL (ref 2.4–3.5)
GLUCOSE SERPL-MCNC: 103 MG/DL (ref 82–115)
HCT VFR BLD AUTO: 42.4 % (ref 37–47)
HEMOLYSIS INTERF INDEX SERPL-ACNC: 13
HGB BLD-MCNC: 14.2 G/DL (ref 12–16)
ICTERIC INTERF INDEX SERPL-ACNC: 1
LIPEMIC INTERF INDEX SERPL-ACNC: 6
LYMPHOCYTES # BLD AUTO: 1 10*3/UL (ref 0.6–4.6)
LYMPHOCYTES NFR BLD AUTO: 23.1 %
MANUAL DIFF? (OHS): NO
MCH RBC QN AUTO: 30.5 PG (ref 27–31)
MCHC RBC AUTO-ENTMCNC: 33.5 G/DL (ref 33–36)
MCV RBC AUTO: 91.2 FL (ref 80–94)
MONOCYTES # BLD AUTO: 0.4 10*3/UL (ref 0.1–1.3)
MONOCYTES NFR BLD AUTO: 9.2 %
NEUTROPHILS # BLD AUTO: 2.9 10*3/UL (ref 2.1–9.2)
NEUTROPHILS NFR BLD AUTO: 64.7 %
PLATELET # BLD AUTO: 168 10*3/UL (ref 130–400)
PMV BLD AUTO: 8.9 FL (ref 9.4–12.4)
POTASSIUM SERPL-SCNC: 4.4 MMOL/L (ref 3.5–5.1)
PROT SERPL-MCNC: 7.4 G/DL (ref 5.8–7.6)
RBC # BLD AUTO: 4.65 10*6/UL (ref 4.2–5.4)
SODIUM SERPL-SCNC: 141 MMOL/L (ref 136–145)
TSH SERPL-ACNC: 1.55 M[IU]/L (ref 0.35–4.94)
WBC # SPEC AUTO: 4.6 10*3/UL (ref 4.5–11.5)

## 2022-04-07 ENCOUNTER — HISTORICAL (OUTPATIENT)
Dept: INFUSION THERAPY | Facility: HOSPITAL | Age: 67
End: 2022-04-07

## 2022-04-07 LAB
ABS NEUT (OLG): 1.68 (ref 2.1–9.2)
ALBUMIN SERPL-MCNC: 3.8 G/DL (ref 3.4–4.8)
ALBUMIN/GLOB SERPL: 1.2 {RATIO} (ref 1.1–2)
ALP SERPL-CCNC: 64 U/L (ref 40–150)
ALT SERPL-CCNC: 17 U/L (ref 0–55)
AST SERPL-CCNC: 15 U/L (ref 5–34)
BASOPHILS # BLD AUTO: 0 10*3/UL (ref 0–0.2)
BASOPHILS NFR BLD AUTO: 0.6 %
BILIRUB SERPL-MCNC: 0.6 MG/DL
BILIRUBIN DIRECT+TOT PNL SERPL-MCNC: 0.2 (ref 0–0.5)
BILIRUBIN DIRECT+TOT PNL SERPL-MCNC: 0.4 (ref 0–0.8)
BUN SERPL-MCNC: 16.9 MG/DL (ref 9.8–20.1)
CALCIUM SERPL-MCNC: 9.3 MG/DL (ref 8.7–10.5)
CHLORIDE SERPL-SCNC: 104 MMOL/L (ref 98–107)
CO2 SERPL-SCNC: 26 MMOL/L (ref 23–31)
CREAT SERPL-MCNC: 0.89 MG/DL (ref 0.55–1.02)
EOSINOPHIL # BLD AUTO: 0.1 10*3/UL (ref 0–0.9)
EOSINOPHIL NFR BLD AUTO: 4.5 %
ERYTHROCYTE [DISTWIDTH] IN BLOOD BY AUTOMATED COUNT: 12.4 % (ref 11.5–17)
GLOBULIN SER-MCNC: 3.3 G/DL (ref 2.4–3.5)
GLUCOSE SERPL-MCNC: 92 MG/DL (ref 82–115)
HCT VFR BLD AUTO: 41.9 % (ref 37–47)
HEMOLYSIS INTERF INDEX SERPL-ACNC: 3
HGB BLD-MCNC: 13.7 G/DL (ref 12–16)
ICTERIC INTERF INDEX SERPL-ACNC: 1
LIPEMIC INTERF INDEX SERPL-ACNC: 0
LYMPHOCYTES # BLD AUTO: 0.9 10*3/UL (ref 0.6–4.6)
LYMPHOCYTES NFR BLD AUTO: 30 %
MANUAL DIFF? (OHS): NO
MCH RBC QN AUTO: 30.1 PG (ref 27–31)
MCHC RBC AUTO-ENTMCNC: 32.7 G/DL (ref 33–36)
MCV RBC AUTO: 92.1 FL (ref 80–94)
MONOCYTES # BLD AUTO: 0.4 10*3/UL (ref 0.1–1.3)
MONOCYTES NFR BLD AUTO: 11.2 %
NEUTROPHILS # BLD AUTO: 1.7 10*3/UL (ref 2.1–9.2)
NEUTROPHILS NFR BLD AUTO: 53.7 %
PLATELET # BLD AUTO: 153 10*3/UL (ref 130–400)
PMV BLD AUTO: 8.7 FL (ref 9.4–12.4)
POTASSIUM SERPL-SCNC: 4.4 MMOL/L (ref 3.5–5.1)
PROT SERPL-MCNC: 7.1 G/DL (ref 5.8–7.6)
RBC # BLD AUTO: 4.55 10*6/UL (ref 4.2–5.4)
SODIUM SERPL-SCNC: 138 MMOL/L (ref 136–145)
TSH SERPL-ACNC: 1.99 M[IU]/L (ref 0.35–4.94)
WBC # SPEC AUTO: 3.1 10*3/UL (ref 4.5–11.5)

## 2022-04-09 ENCOUNTER — HISTORICAL (OUTPATIENT)
Dept: ADMINISTRATIVE | Facility: HOSPITAL | Age: 67
End: 2022-04-09
Payer: MEDICARE

## 2022-04-26 VITALS
WEIGHT: 156 LBS | HEIGHT: 66 IN | SYSTOLIC BLOOD PRESSURE: 108 MMHG | BODY MASS INDEX: 25.07 KG/M2 | DIASTOLIC BLOOD PRESSURE: 80 MMHG | OXYGEN SATURATION: 98 %

## 2022-04-27 DIAGNOSIS — Z17.0 MALIGNANT NEOPLASM OF CENTRAL PORTION OF RIGHT BREAST IN FEMALE, ESTROGEN RECEPTOR POSITIVE: Primary | ICD-10-CM

## 2022-04-27 DIAGNOSIS — C50.111 MALIGNANT NEOPLASM OF CENTRAL PORTION OF RIGHT BREAST IN FEMALE, ESTROGEN RECEPTOR POSITIVE: Primary | ICD-10-CM

## 2022-04-27 DIAGNOSIS — C22.0 LIVER CELL CARCINOMA: Primary | ICD-10-CM

## 2022-04-27 DIAGNOSIS — R53.1 WEAKNESS: ICD-10-CM

## 2022-04-30 NOTE — PROGRESS NOTES
Patient:   Maci Lewis            MRN: [id number]            FIN: [id number]               Age:   63 years     Sex:  Female     :  1955   Associated Diagnoses:   None   Author:   Heriberto Schneider MD      Visit Information   Diagnosis: Recurrent multifocal hepatocellular carcinoma                    Stage IIA right breast cancer 10/13 (T2 N0 M0), 2.4 cm, grade III, ER/CT neg, Her-2 neg                    Positive BRCA2 mutation        S/p bilateral mastectomies and BSO     Current Treatment: Nivolumab Q 4 weeks (started 10/25/18)  Treatment History: AC x4 () -->weekly Taxol x12 () ---> XRT x 14 days ---> bilateral mastectomies; Partial right hepatectomy ; right hepatic segmentectomy ; Y-90 RHA 18; Y-90 LHA 18; Nexavar -18    Clinical History: Patient had a normal annual mammogram . She noted a nontender lump in the upper inner quadrant of the left breast on self-examination. Mammogram and ultrasound were suspicious for malignancy. Biopsy was positive for infiltrating ductal carcinoma. She underwent lumpectomy and sentinel lymph node dissection 10/28/13. Final pathology showed a 2.5 cm grade 3 infiltrating ductal carcinoma with clear margins, no lymphovascular invasion. 6 resected lymph nodes were negative for involvement. Estrogen and progesterone receptors were negative and HER-2 was negative for overexpression by FISH. There was no family history of breast cancer.  Adjuvant treatment was recommended with a regimen of Adriamycin/Cytoxan (AC) for 4 cycles every 3 weeks followed by weekly Taxol for 12 weeks and postlumpectomy radiation.  Baseline MUGA scan 13 showed normal wall motion with an LVEF of 76%.  She underwent placement of a Mediport and chemotherapy was initiated 13.  She completed 4 cycles of adjuvant AC chemotherapy with Neulasta support 14.  She did not require dose reduction or treatment delay.  Weekly Taxol was initiated  3/21/14. Taxol was dose reduced by 20% beginning with cycle #5 for development of mucositis and rash to bilateral hands. She completed her adjuvant chemotherapy 6/6/14.    She initiated adjuvant radiation therapy to the right breast. She subsequently underwent genetic counseling and testing. She tested positive for a BRCA2 gene mutation. Following a lengthy discussion, she made a decision to undergo bilateral mastectomies and further radiation was held. She completed 14 fractions of treatment.  She underwent bilateral mastectomies and ROCIO reconstruction 12/8/14. Breast pathology from the surgery was unremarkable.  A screening abdominal ultrasound 10/7/14 showed an unremarkable pancreas, liver and bile ducts.  She underwent prophylactic bilateral salpingo-oophorectomy 6/11/15.    She presented to the hospital 7/24/16 with acute right neck and shoulder pain, followed by abdominal pain and distention.  On presentation to the ER, she was hypotensive.  Initial H&H was 12.8 and 39.5 which decreased to 9.5 and 29.0 on repeat.  CT of the abdomen showed 2 complex heterogeneous masses along the inferior aspect of the right hepatic lobe.  The larger more peripheral mass showed evidence of active hemorrhage.  There was a large subcapsular hematoma with free fluid in the pelvis.  Attempted embolization by interventional radiology was unsuccessful.  She was taken to the OR for exploratory laparotomy.  A large intra-abdominal blood collection was evacuated.  There was tumor in hepatic segment 6 with necrosis and rupture with some minimal oozing but no active bleeding.  Partial right hepatectomy was performed.  Intraoperative ultrasound of the liver showed no other suspicious lesions.  Alpha-fetoprotein level was normal.  Final pathology showed a 5.3 cm intermediate grade hepatocellular carcinoma with focal hemorrhagic infarction, fibrosis and hepatic capsular rupture.  Margins of resection were clear.  following surgical  recovery, she was discharged to home 7/29/16 on oral iron supplementation. Follow-up CT scan of the abdomen and pelvis 10/14/16 showed postoperative changes in the lateral inferior margin of the right hepatic lobe and a small fluid collection. There was no abnormal contrast enhancement or evidence of suspicious liver lesions. Repeat scans 2/14/17 showed stable postoperative changes with improved small perihepatic fluid collection.    CT of the abdomen and pelvis 7/24/17 showed postsurgical changes along the inferior right hepatic lobe with a new area of hypoattenuation inferiorly along the suture line measuring up to about 12 mm. This finding was not associated with contrast enhancement to suggest recurrence. Short-term interval follow-up was recommended in 3 months by Radiology. There were small stable hepatic cysts. There were no other findings suspicious for disease recurrence. Follow-up CT scan 10/25/17 showed significant increase of the hypodense lesion adjacent to the suture line in am. Aspect of the right hepatic lobe to 4.7 x 4.2 cm. There was minimal low-density change within the lesion suggesting central necrosis and subtle contrast enhancement. Alpha-fetoprotein level was normal. CT scan of the chest 11/3/17 showed no evidence of metastatic disease. There was a small hypodense nodule in the right thyroid. She was referred to surgical oncology and underwent resection of segment 6 of the liver 11/24/17 along with cholecystectomy. Final pathology showed a moderately differentiated hepatocellular carcinoma measuring 6.7 cm in greatest dimension. There is no evidence of vascular or perineural invasion. All resection margins were clear. Surveillance CT scans of the chest, abdomen and pelvis to/22/18 showed postoperative changes of the right hepatic lobe with no findings suspicious for disease recurrence. There was a stable, minimal right upper lobe subpleural nodule unchanged from her previous exams. Films were  reviewed in conjunction with Surgical Oncology.    Surveillance CT scan of the abdomen and pelvis 5/25/18 shows significant progression and recurrence of her disease with a dominant mass in segment 5 of the right hepatic lobe measuring 6.7 cm.  There was a new mass in the right lobe measuring 3.7 cm in segment 7 and also in segment 8 measuring 1.6 cm.  A new lesion in the left lobe of the liver measured 4.2 cm.  Alpha-fetoprotein level was normal at 4.5 ng/mL.  She was referred for a CT-guided biopsy of the liver 6/5/18.  Pathology confirmed hepatocellular carcinoma, well-differentiated.    Her case was discussed with Surgical Oncology locally.  She was referred on to interventional radiology for consideration of Y-90 radioembolization.  She successfully underwent Y-90 radioembolization to the right hepatic artery to a total dose of 335 mGy 6/19/18.  She was evaluated at [facility] 6/20/18 by Dr. Gigi Perkins for consideration of liver transplant.  Based on her disease burden, she did not meet criteria for liver transplantation.  Recommendations were to attempt to downstage her tumor burden through radioembolization and potentially revisit transplantation in the future depending on her response.  Her case was presented at interdisciplinary conference, and these recommendations were supported, along with addition of Nexavar for systemic treatment as tolerated.  Nexavar was initiated 8/2/18.     She underwent Y-90 radioembolization of the left hepatic artery 8/14/18 to a total dose of 256 mGy.  She developed progressive fatigue, body aches, and fever up to 101 with no evidence of infection.  She took Naprosyn with minimal improvement.  She developed an erythematous rash involving her face and pruritis of the scalp felt secondary Nexavar.  She also developed significant alopecia. Laboratory showed worsening of her transaminitis and anemia following the embolization. Nexavar treatment had to be discontinued secondary to  severe toxicities. Follow-up CT scan of the abdomen and pelvis 10/1/18 showed slight interval decrease of the dominant right hepatic mass measuring 5.8 x 5.4 cm (previous 6.7 x 6.0 cm). However, there was disease progression elsewhere. Dominant left hepatic lobe mass measured 10.1 x 6.9 cm (previous 4.3 x 3.3 cm) and a second mass in segment 4 measured 7.6 x 7.3 cm (previous 4.1 x 3.0 cm). Portal vein was patent. There was no abnormal adenopathy, fluid collections or evidence of metastatic disease. Additional treatment options were discussed includin) resuming Nexavar at a dose reduction, 2) second line therapy with Levatinib or Stivarga or 3) immunotherapy with Nivolumab. She was referred to the phase [place] at [place] to evaluate for potential clinical trial eligibility.    She was seen at [facility] 10/18/18 by Dr. Christopher for a second treatment opinion. She was not eligible for any current clinical trials at their institution. Her pathology was requested and submitted for next generation sequencing to determine if she had any targetable mutations. We discussed further treatment and both Dr. Christopher and I agreed that her best option for further therapy would be treatment with Nivolumab given the uncharacteristic nature and behavior of her multifocal hepatocellular carcinoma area did benefits, risks and toxicities were reviewed and discussed with the patient and her  and treatment was initiated 10/25/18.    Restaging CT of the abdomen 1/15/19 after 3 months of therapy showed significant disease regression.  The reference left hepatic lobe mass measured 4.8 x 2.0 cm (previously 10.1 x 6.9 cm), mass in segment 4 was 4.3 x 2.0 cm (previous 7.6 x 7.3 cm) and a posterior right hepatic lobe mass was 2.7 x 2.1 cm (previous 5.2 x 4.3 cm). Her transaminases completely normalized and her anemia resolved. CT scans of the chest, abdomen and pelvis 19 showed continued decrease in size of the multiple  hepatic lesions. Representative left hepatic lobe mass measured 3.6 cm (previous 4.8 cm). There were no new lesions. Spleen was unremarkable. There is a 2 mm noncalcified nodule in the left upper lobe. There is no suspicious mediastinal or hilar adenopathy.      Chief Complaint   Urinary symptoms improving      Interval History   She returns to the office today by herself for a four-week follow-up visit. She continues on treatment with Nivolumab every 4 weeks. She is not having any significant autoimmune mediated side effects. She denies chest pain, shortness of breath, abdominal pain, nausea or diarrhea. Laboratory testing continues to show normal transaminases and alkaline phosphatase. She has no evidence of recurrent anemia. She feels well. Appetite is good, no significant weight loss. She presented to a walk-in clinic 4/29/19 with symptoms of a UTI. She was treated with Macrodantin. Culture returned positive for a fluoroquinolone resistant Escherichia coli. Her antibiotics were changed to Bactrim with resolution of her symptoms.      Review of Systems   Constitutional:  No significant weight loss, No fever, No chills, No weakness, No fatigue, No decreased activity.    Eye:  No blurring, No double vision, No visual disturbances.    Ear/Nose/Mouth/Throat:  No nasal congestion, No sore throat.    Respiratory:  No shortness of breath, No cough, No sputum production, No hemoptysis, No wheezing.    Cardiovascular:  No chest pain, No palpitations, No tachycardia.    Breast:  Bilateral mastectomies with ROCIO reconstruction.  No changes on self-examination.    Gastrointestinal:  No nausea, No vomiting, No diarrhea, No constipation, No heartburn, No abdominal pain.    Genitourinary:  No dysuria, No hematuria, No change in urine stream.    Hematology/Lymphatics:  No bruising tendency, No bleeding tendency, No swollen lymph glands.    Musculoskeletal:  No lower extremity edema, No neck pain, No joint pain.    Integumentary:   Dryness (facial), No rash, No pruritus, No skin lesion.    Neurologic:  Alert and oriented X4, No abnormal balance, No confusion, No numbness, No tingling, No headache.    Psychiatric:  No anxiety, No depression.       Health Status   Current medications:  (Selected)   Prescriptions  Prescribed  Bactrim  mg-160 mg oral tablet: 1 tab(s), Oral, BID, X 7 day(s), # 14 tab(s), 0 Refill(s), Pharmacy: [place], [place]  Documented Medications  Documented  Caltrate 600 mg oral tablet: 600 mg = 1 tab(s), Oral, Daily, 0 Refill(s)  Multivitamins and Minerals oral tablet: 1 tab(s), Oral, Daily, 0 Refill(s)  Zantac 150 oral tablet: 150 mg = 1 tab(s), Oral, Once a day (at bedtime), # 30 tab(s), 0 Refill(s)  biotin 5000 mcg oral tablet, disintegratin,000 mcg = 1 tab(s), Oral, Daily, with meals  clobetasol 0.05% topical cream: TOP, BID  omega-3 polyunsaturated fatty acids (Fish Oil 1000mg Cap): 2 cap(s), Oral, BID, 0 Refill(s)      Histories   Past Medical History:    Resolved  Arthritis ([id number]):  Resolved.  Breast cancer ([id number]):  Resolved.  Hepatocellular carcinoma ([id number]):  Resolved.   Family History:    Kidney cancer, primary, with metastasis from kidney to other site....  Father  , Paternal uncle and maternal grandfather also had kidney cancer   Procedure history:    Exploration Laparotomy (Right) on 2017 at 61 Years.  Comments:  2017 12:24 - Mile Ortega RN  auto-populated from documented surgical case  Hepatic Resection on 2017 at 61 Years.  Comments:  2017 12:24 - Mile Ortega RN  auto-populated from documented surgical case  Cholecystectomy (.) on 2017 at 61 Years.  Comments:  2017 12:31 - Mile Ortega RN  auto-populated from documented surgical case  Hepatic Resection (None) on 2016 at 60 Years.  Comments:  2016 13:05 - Carina Fung RN  auto-populated from documented surgical case  Right breast  lumpectomy.  Tonsillectomy.  Left foot bone spur removal.  Jose mastectomy with flap reconstruction.  Partial hysterectomy.  breast reconstruction revision / fat graph 11/16/2015.  Y90 - Yttrium 90 ([id number]).  Liposuction of abdomen ([id number]).      Gynecologic history   Menstrual cycle: Menarche age 11, first pregnancy at 30, + breast fed, menopause 46, HRT x 2 yrs.   Social History        Social & Psychosocial Habits    Alcohol  08/01/2018  Use: Past    Type: Beer, Liquor, Wine    Frequency: 1-2 times per week    Employment/School  12/12/2013  Status: Employed    Description:     Home/Environment  12/12/2013  Lives with: Spouse    Nutrition/Health  10/05/2015  Type of diet: Regular    Substance Abuse  05/21/2015 Risk Assessment: Denies Substance Abuse    10/05/2015  Use: Never    Tobacco  12/12/2013 Risk Assessment: Denies Tobacco Use    04/10/2019  Use: Never (less than 100 in l    Patient Wants Consult For Cessation Counseling N/A.        Physical Examination   Vital Signs   5/8/2019 10:14 CDT       Temperature Oral          37 DegC                             Temperature Oral (calculated)             98.60 DegF                             Peripheral Pulse Rate     80 bpm                             Systolic Blood Pressure   112 mmHg                             Diastolic Blood Pressure  79 mmHg     General:  Alert and oriented, Well-developed white female in no acute distress.    Eye:  Pupils are equal, round and reactive to light, Extraocular movements are intact, Normal conjunctiva, Sclera nonicteric.    HENT:  Normocephalic (+ Patchy alopecia), Oral mucosa is moist, No pharyngeal erythema.    Neck:  Supple, Non-tender, No lymphadenopathy.    Respiratory:  Lungs are clear to auscultation, Respirations are non-labored, Breath sounds are equal.    Cardiovascular:  Normal rate, Regular rhythm, No murmur.    Breast:  Bilateral mastectomies with ROCIO reconstruction. No palpable masses, skin changes  or axillary nodes bilaterally.    Gastrointestinal:  Soft, Non-tender, Non-distended, Normal bowel sounds, Well-healed RUQ incision.  No palpable masses or organomegaly.    Lymphatics:  No lymphadenopathy neck, axilla, groin.    Musculoskeletal:  Normal range of motion, No tenderness, No lower extremity edema.    Integumentary:  Warm, Dry, No rash.    Neurologic:  Alert, Oriented, Normal sensory, Normal motor function, No focal deficits.    ECOG Performance Scale: 0 - Fully active; no performance restrictions.      Review / Management   Laboratory Results   Today's Lab Results : PowerNote Discrete Results   5/8/2019 10:27 CDT       POC TCO2                  25.0 mmol/L                             POC Hb                    14.3 mg/dL                             POC Hct                   42.0 %                             POC Sodium                138 mmol/L                             POC Potassium             4.3 mmol/L                             POC Chloride              103 mmol/L                             POC Ion Calcium           1.17 mmol/L                             POC Glucose               97 mg/dL                             POC BUN                   20.0 mg/dL                             POC Creatinine            1.2 mg/dL                             POC AGAP                  16.0  NA    5/8/2019 10:17 CDT       WBC                       5.4 x10(3)/mcL                             RBC                       4.57 x10(6)/mcL                             Hgb                       14.0 gm/dL                             Hct                       42.9 %                             Platelet                  203 x10(3)/mcL                             MCV                       93.9 fL                             MCH                       30.6 pg                             MCHC                      32.6 gm/dL  LOW                             RDW                       12.1 %                             MPV                        8.6 fL  LOW                             Abs Neut                  4.31 x10(3)/mcL                             NEUT%                     80.2 %  NA                             NEUT#                     4.3 x10(3)/mcL                             LYMPH%                    9.1 %  NA                             LYMPH#                    0.5 x10(3)/mcL  LOW                             MONO%                     8.4 %  NA                             MONO#                     0.4 x10(3)/mcL                             EOS%                      1.5 %  NA                             EOS#                      0.1 x10(3)/mcL                             BASO%                     0.6 %  NA                             BASO#                     0.0 x10(3)/mcL           Impression and Plan   IMPRESSION:  Recurrent multifocal hepatocellular carcinoma   Stage IIA triple negative breast cancer 10/13 - KASSI  Positive BRCA2 mutation  Escherichia coli UTI    PLAN:  Continue Nivolumab 480 mg Q 4 weeks.   Complete course of Bactrim for UTI.  RTC in 4 weeks for follow-up visit, clinical exam and repeat laboratory.  Will perform restaging CT scans in approximately 2 months.        HERIBERTO SCHNEIDER M.D.    Other Physicians  Dr. Arthur Patel ()  Dr. Richard Christopher ([facility])      Professional Services   I, Alycia Lombardi LPN, acted solely as a scribe for and in the presence of, Dr. Heriberto Schneider, who performed the service.

## 2022-04-30 NOTE — PROGRESS NOTES
Patient:   Maci Lewis            MRN: [id number]            FIN: [id number]               Age:   62 years     Sex:  Female     :  1955   Associated Diagnoses:   None   Author:   Heriberto Schneider MD      Visit Information   Diagnosis: Recurrent multifocal hepatocellular carcinoma                    Stage IIA right breast cancer 10/13 (T2 N0 M0), 2.4 cm, grade III, ER/MT neg, Her-2 neg                    Positive BRCA2 mutation        S/p bilateral mastectomies and BSO     Current Treatment: Nivolumab Q 2 weeks - s/p 2 cycles (started 10/25/18)  Treatment History: AC x4 () -->weekly Taxol x12 () ---> XRT x 14 days ---> bilateral mastectomies; Partial right hepatectomy ; right hepatic segmentectomy ; Y-90 RHA 18; Y-90 LHA 18; Nexavar -18    Clinical History: Patient had a normal annual mammogram . She noted a nontender lump in the upper inner quadrant of the left breast on self-examination. Mammogram and ultrasound were suspicious for malignancy. Biopsy was positive for infiltrating ductal carcinoma. She underwent lumpectomy and sentinel lymph node dissection 10/28/13. Final pathology showed a 2.5 cm grade 3 infiltrating ductal carcinoma with clear margins, no lymphovascular invasion. 6 resected lymph nodes were negative for involvement. Estrogen and progesterone receptors were negative and HER-2 was negative for overexpression by FISH. There was no family history of breast cancer.  Adjuvant treatment was recommended with a regimen of Adriamycin/Cytoxan (AC) for 4 cycles every 3 weeks followed by weekly Taxol for 12 weeks and postlumpectomy radiation.  Baseline MUGA scan 13 showed normal wall motion with an LVEF of 76%.  She underwent placement of a Mediport and chemotherapy was initiated 13.  She completed 4 cycles of adjuvant AC chemotherapy with Neulasta support 14.  She did not require dose reduction or treatment delay.  Weekly Taxol  was initiated 3/21/14. Taxol was dose reduced by 20% beginning with cycle #5 for development of mucositis and rash to bilateral hands. She completed her adjuvant chemotherapy 6/6/14.    She initiated adjuvant radiation therapy to the right breast. She subsequently underwent genetic counseling and testing. She tested positive for a BRCA2 gene mutation. Following a lengthy discussion, she made a decision to undergo bilateral mastectomies and further radiation was held. She completed 14 fractions of treatment.  She underwent bilateral mastectomies and ROCIO reconstruction 12/8/14. Breast pathology from the surgery was unremarkable.  A screening abdominal ultrasound 10/7/14 showed an unremarkable pancreas, liver and bile ducts.  She underwent prophylactic bilateral salpingo-oophorectomy 6/11/15.    She presented to the hospital 7/24/16 with acute right neck and shoulder pain, followed by abdominal pain and distention.  On presentation to the ER, she was hypotensive.  Initial H&H was 12.8 and 39.5 which decreased to 9.5 and 29.0 on repeat.  CT of the abdomen showed 2 complex heterogeneous masses along the inferior aspect of the right hepatic lobe.  The larger more peripheral mass showed evidence of active hemorrhage.  There was a large subcapsular hematoma with free fluid in the pelvis.  Attempted embolization by interventional radiology was unsuccessful.  She was taken to the OR for exploratory laparotomy.  A large intra-abdominal blood collection was evacuated.  There was tumor in hepatic segment 6 with necrosis and rupture with some minimal oozing but no active bleeding.  Partial right hepatectomy was performed.  Intraoperative ultrasound of the liver showed no other suspicious lesions.  Alpha-fetoprotein level was normal.  Final pathology showed a 5.3 cm intermediate grade hepatocellular carcinoma with focal hemorrhagic infarction, fibrosis and hepatic capsular rupture.  Margins of resection were clear.  following  surgical recovery, she was discharged to home 7/29/16 on oral iron supplementation. Follow-up CT scan of the abdomen and pelvis 10/14/16 showed postoperative changes in the lateral inferior margin of the right hepatic lobe and a small fluid collection. There was no abnormal contrast enhancement or evidence of suspicious liver lesions. Repeat scans 2/14/17 showed stable postoperative changes with improved small perihepatic fluid collection.    CT of the abdomen and pelvis 7/24/17 showed postsurgical changes along the inferior right hepatic lobe with a new area of hypoattenuation inferiorly along the suture line measuring up to about 12 mm. This finding was not associated with contrast enhancement to suggest recurrence. Short-term interval follow-up was recommended in 3 months by Radiology. There were small stable hepatic cysts. There were no other findings suspicious for disease recurrence. Follow-up CT scan 10/25/17 showed significant increase of the hypodense lesion adjacent to the suture line in am. Aspect of the right hepatic lobe to 4.7 x 4.2 cm. There was minimal low-density change within the lesion suggesting central necrosis and subtle contrast enhancement. Alpha-fetoprotein level was normal. CT scan of the chest 11/3/17 showed no evidence of metastatic disease. There was a small hypodense nodule in the right thyroid. She was referred to surgical oncology and underwent resection of segment 6 of the liver 11/24/17 along with cholecystectomy. Final pathology showed a moderately differentiated hepatocellular carcinoma measuring 6.7 cm in greatest dimension. There is no evidence of vascular or perineural invasion. All resection margins were clear. Surveillance CT scans of the chest, abdomen and pelvis to/22/18 showed postoperative changes of the right hepatic lobe with no findings suspicious for disease recurrence. There was a stable, minimal right upper lobe subpleural nodule unchanged from her previous exams.  Films were reviewed in conjunction with Surgical Oncology.    Surveillance CT scan of the abdomen and pelvis 5/25/18 shows significant progression and recurrence of her disease with a dominant mass in segment 5 of the right hepatic lobe measuring 6.7 cm.  There was a new mass in the right lobe measuring 3.7 cm in segment 7 and also in segment 8 measuring 1.6 cm.  A new lesion in the left lobe of the liver measured 4.2 cm.  Alpha-fetoprotein level was normal at 4.5 ng/mL.  She was referred for a CT-guided biopsy of the liver 6/5/18.  Pathology confirmed hepatocellular carcinoma, well-differentiated.    Her case was discussed with Surgical Oncology locally.  She was referred on to interventional radiology for consideration of Y-90 radioembolization.  She successfully underwent Y-90 radioembolization to the right hepatic artery to a total dose of 335 mGy 6/19/18.  She was evaluated at [facility] 6/20/18 by Dr. Gigi Perkins for consideration of liver transplant.  Based on her disease burden, she did not meet criteria for liver transplantation.  Recommendations were to attempt to downstage her tumor burden through radioembolization and potentially revisit transplantation in the future depending on her response.  Her case was presented at interdisciplinary conference, and these recommendations were supported, along with addition of Nexavar for systemic treatment as tolerated.  Nexavar was initiated 8/2/18.     She underwent Y-90 radioembolization of the left hepatic artery 8/14/18 to a total dose of 256 mGy.  She developed progressive fatigue, body aches, and fever up to 101 with no evidence of infection.  She took Naprosyn with minimal improvement.  She developed an erythematous rash involving her face and pruritis of the scalp felt secondary Nexavar.  She also developed significant alopecia. Laboratory showed worsening of her transaminitis and anemia following the embolization. Nexavar treatment had to be discontinued  secondary to severe toxicities. Follow-up CT scan of the abdomen and pelvis 10/1/18 showed slight interval decrease of the dominant right hepatic mass measuring 5.8 x 5.4 cm (previous 6.7 x 6.0 cm). However, there was disease progression elsewhere. Dominant left hepatic lobe mass measured 10.1 x 6.9 cm (previous 4.3 x 3.3 cm) and a second mass in segment 4 measured 7.6 x 7.3 cm (previous 4.1 x 3.0 cm). Portal vein was patent. There was no abnormal adenopathy, fluid collections or evidence of metastatic disease. Additional treatment options were discussed includin) resuming Nexavar at a dose reduction, 2) second line therapy with Levatinib or Stivarga or 3) immunotherapy with Nivolumab. She was referred to the phase I program at [facility] to evaluate for potential clinical trial eligibility.    She was seen at [facility] 10/18/18 by Dr. Christopher for a second treatment opinion. She was not eligible for any current clinical trials at their institution. Her pathology was requested and submitted for next generation sequencing to determine if she had any targetable mutations. We discussed further treatment and both Dr. Christopher and I agreed that her best option for further therapy would be treatment with Nivolumab given the uncharacteristic nature and behavior of her multifocal hepatocellular carcinoma area did benefits, risks and toxicities were reviewed and discussed with the patient and her  and treatment was initiated 10/25/18.      Chief Complaint   I did well with the medication, I feel good      Interval History   She returns to clinic today for a four-week follow-up visit accompanied by her . She has received 2 infusions of Nivolumab. Treatment has been well tolerated. She has had no evidence of autoimmune induced toxicities. She feels well, her energy level is improved. She has a good appetite, no weight loss. She denies significant abdominal pain. Her serum transaminases and alkaline  phosphatase levels have improved. She had a minor nosebleed on the left but was taking Aleve several times a day for chronic neck pain. No other evidence of bleeding or bruising. CBC shows no significant cytopenias. She does not have significant hepatomegaly on physical exam.      Review of Systems   Constitutional:  No fever, No chills, No weakness, No fatigue, No decreased activity.    Eye:  No blurring, No double vision, No visual disturbances.    Ear/Nose/Mouth/Throat:  No nasal congestion, No sore throat.    Respiratory:  No shortness of breath, No cough, No sputum production, No hemoptysis, No wheezing.    Cardiovascular:  No chest pain, No palpitations, No tachycardia.    Breast:  Bilateral mastectomies with ROCIO reconstruction.  No changes on self-examination.    Gastrointestinal:  No significant abdominal pain, No nausea, No vomiting, No diarrhea, No constipation, No heartburn.    Genitourinary:  No dysuria, No hematuria, No change in urine stream.    Hematology/Lymphatics:  No bruising tendency, No bleeding tendency, No swollen lymph glands.    Musculoskeletal:  No lower extremity edema, No neck pain, No joint pain.    Integumentary:  No rash, No pruritus, No skin lesion.    Neurologic:  Alert and oriented X4, No abnormal balance, No confusion, No numbness, No tingling, No headache.    Psychiatric:  No anxiety, No depression.       Health Status   Current medications:  (Selected)   Prescriptions  Prescribed  Protonix 40 mg ORAL enteric coated tablet: 40 mg = 1 tab(s), Oral, Daily, # 30 tab(s), 5 Refill(s), Pharmacy: [place], [place]  Documented Medications  Documented  Caltrate 600 mg oral tablet: 600 mg = 1 tab(s), Oral, Daily, 0 Refill(s)  Multivitamins and Minerals oral tablet: 1 tab(s), Oral, Daily, 0 Refill(s)  NAPROXEN     TAB 250MG:   Zantac: 150 mg, Oral, Daily, 0 Refill(s)  biotin 5000 mcg oral tablet, disintegratin,000 mcg = 1 tab(s), Oral, Daily, with meals  omega-3  polyunsaturated fatty acids (Fish Oil 1000mg Cap): 2 cap(s), Oral, BID, 0 Refill(s)      Histories   Past Medical History:    Resolved  Arthritis ([id number]):  Resolved.  Breast cancer ([id number]):  Resolved.  Hepatocellular carcinoma ([id number]):  Resolved.   Family History:    Kidney cancer, primary, with metastasis from kidney to other site....  Father  , Paternal uncle and maternal grandfather also had kidney cancer   Procedure history:    Exploration Laparotomy (Right) on 11/24/2017 at 61 Years.  Comments:  11/24/2017 12:24 - Mile Ortega RN  auto-populated from documented surgical case  Hepatic Resection on 11/24/2017 at 61 Years.  Comments:  11/24/2017 12:24 - Mile Ortega RN  auto-populated from documented surgical case  Cholecystectomy (.) on 11/24/2017 at 61 Years.  Comments:  11/24/2017 12:31 - Mile Ortega RN  auto-populated from documented surgical case  Hepatic Resection (None) on 7/25/2016 at 60 Years.  Comments:  7/25/2016 13:05 - Antonieta MURPHY, Carina BEARD  auto-populated from documented surgical case  Right breast lumpectomy.  Tonsillectomy.  Left foot bone spur removal.  Jose mastectomy with flap reconstruction.  Partial hysterectomy.  breast reconstruction revision / fat graph 11/16/2015.  Y90 - Yttrium 90 ([id number]).  Liposuction of abdomen ([id number]).      Gynecologic history   Menstrual cycle: Menarche age 11, first pregnancy at 30, + breast fed, menopause 46, HRT x 2 yrs.   Social History        Social & Psychosocial Habits    Alcohol  08/01/2018  Use: Past    Type: Beer, Liquor, Wine    Frequency: 1-2 times per week    Employment/School  12/12/2013  Status: Employed    Description:     Home/Environment  12/12/2013  Lives with: Spouse    Nutrition/Health  10/05/2015  Type of diet: Regular    Substance Abuse  05/21/2015 Risk Assessment: Denies Substance Abuse    10/05/2015  Use: Never    Tobacco  12/12/2013 Risk Assessment: Denies Tobacco Use    11/08/2018   Use: Never smoker    Smoking Cessation Counseling N/A.        Physical Examination   Vital Signs   11/21/2018 11:07 CST     Temperature Oral          36.8 DegC                             Temperature Oral (calculated)             98.24 DegF                             Peripheral Pulse Rate     75 bpm                             Systolic Blood Pressure   107 mmHg                             Diastolic Blood Pressure  73 mmHg     General:  Alert and oriented, Well-developed white female in no acute distress.    Eye:  Pupils are equal, round and reactive to light, Extraocular movements are intact, Normal conjunctiva, Sclera nonicteric.    HENT:  Normocephalic, Oral mucosa is moist, No pharyngeal erythema, Patchy significant alopecia.    Neck:  Supple, Non-tender, No lymphadenopathy.    Respiratory:  Lungs are clear to auscultation, Respirations are non-labored, Breath sounds are equal.    Cardiovascular:  Normal rate, Regular rhythm, No murmur.    Breast:  Not examined today.    Gastrointestinal:  Soft, Non-distended, Normal bowel sounds, Well-healed RUQ incision.  No palpable hepatomegaly or tenderness on exam.    Lymphatics:  No lymphadenopathy neck, axilla, groin.    Musculoskeletal:  Normal range of motion, No tenderness, No lower extremity edema.    Integumentary:  Warm, Dry, No rash.    Neurologic:  Alert, Oriented, Normal sensory, Normal motor function, No focal deficits.    ECOG Performance Scale: 1- Strenuous physical activity restricted; fully ambulatory and able to carry out light work.      Review / Management   Results review:  Last 10 Days Lab Results : Lab View   11/21/2018 11:16 CST     POC Sodium                140 mmol/L                             POC Potassium             4.0 mmol/L                             POC Chloride              104 mmol/L                             POC Ion Calcium           1.10 mmol/L  LOW                             POC Glucose               99 mg/dL                              POC BUN                   15.0 mg/dL                             POC Creatinine            0.7 mg/dL                             POC AGAP                  13.0  NA                             POC Hb                    12.9 mg/dL                             POC Hct                   38.0 %                             POC TCO2                  28.0 mmol/L    11/21/2018 11:03 CST     WBC                       3.7 x10(3)/mcL  LOW                             RBC                       4.44 x10(6)/mcL                             Hgb                       12.4 gm/dL                             Hct                       40.9 %                             Platelet                  234 x10(3)/mcL                             MCV                       92.1 fL                             MCH                       27.9 pg                             MCHC                      30.3 gm/dL  LOW                             RDW                       14.9 %                             MPV                       8.7 fL  LOW                             Abs Neut                  2.47 x10(3)/mcL                             NEUT%                     66.8 %  NA                             NEUT#                     2.5 x10(3)/mcL                             LYMPH%                    16.2 %  NA                             LYMPH#                    0.6 x10(3)/mcL                             MONO%                     12.4 %  NA                             MONO#                     0.5 x10(3)/mcL                             EOS%                      3.8 %  NA                             EOS#                      0.1 x10(3)/mcL                             BASO%                     0.8 %  NA                             BASO#                     0.0 x10(3)/mcL  .    Laboratory Results      Impression and Plan   IMPRESSION:  Recurrent multifocal hepatocellular carcinoma   Stage IIA triple negative breast cancer 10/13 - KASSI  Positive BRCA2  mutation  Anemia - resolved    PLAN:  She will proceed with her third cycle of Nivolumab on Friday 11/23/18.  Continue laboratory monitoring every 2 weeks.  RTC in 4 weeks for a follow-up office visit.  Barring any problems or progressive symptoms, will perform restaging scans after 6 cycles of therapy.      HERIBERTO SCHNEIDER MD    Other Physicians  Dr. Arthur Patel ()  Dr. Richard Christopher ([facility])      Professional Services   I, Alycia Lombardi LPN, acted solely as a scribe for and in the presence of, Dr. Heriberto Schneider, who performed the service.

## 2022-04-30 NOTE — PROGRESS NOTES
Patient:   Maci Lewis            MRN: [id number]            FIN: [id number]               Age:   64 years     Sex:  Female     :  1955   Associated Diagnoses:   None   Author:   Heriberto Schneider MD      Visit Information   Diagnosis: Recurrent multifocal hepatocellular carcinoma                  Stage IIA right breast cancer 10/13 (T2 N0 M0), 2.4 cm, grade III, ER/NH neg, Her-2 neg                  Positive BRCA2 mutation                  S/p bilateral mastectomies and BSO     Current Treatment: Nivolumab Q 4 weeks (started 10/25/18)  Treatment History: AC x4 () -->weekly Taxol x12 () ---> XRT x 14 days ---> bilateral mastectomies; Partial right hepatectomy ; right hepatic segmentectomy ; Y-90 RHA 18; Y-90 LHA 18; Nexavar -18    Clinical History:  Patient had a normal annual mammogram . She noted a nontender lump in the upper inner quadrant of the left breast on self-examination. Mammogram and ultrasound were suspicious for malignancy. Biopsy was positive for infiltrating ductal carcinoma. She underwent lumpectomy and sentinel lymph node dissection 10/28/13. Final pathology showed a 2.5 cm grade 3 infiltrating ductal carcinoma with clear margins, no lymphovascular invasion. 6 resected lymph nodes were negative for involvement. Estrogen and progesterone receptors were negative and HER-2 was negative for overexpression by FISH. There was no family history of breast cancer.  Adjuvant treatment was recommended with a regimen of Adriamycin/Cytoxan (AC) for 4 cycles every 3 weeks followed by weekly Taxol for 12 weeks and postlumpectomy radiation.  Baseline MUGA scan 13 showed normal wall motion with an LVEF of 76%.  She underwent placement of a Mediport and chemotherapy was initiated 13.  She completed 4 cycles of adjuvant AC chemotherapy with Neulasta support 14.  She did not require dose reduction or treatment delay.  Weekly Taxol was  initiated 3/21/14. Taxol was dose reduced by 20% beginning with cycle #5 for development of mucositis and rash to bilateral hands. She completed her adjuvant chemotherapy 6/6/14.    She initiated adjuvant radiation therapy to the right breast. She subsequently underwent genetic counseling and testing. She tested positive for a BRCA2 gene mutation. Following a lengthy discussion, she made a decision to undergo bilateral mastectomies and further radiation was held. She completed 14 fractions of treatment.  She underwent bilateral mastectomies and ROCIO reconstruction 12/8/14. Breast pathology from the surgery was unremarkable.  A screening abdominal ultrasound 10/7/14 showed an unremarkable pancreas, liver and bile ducts.  She underwent prophylactic bilateral salpingo-oophorectomy 6/11/15.    She presented to the hospital 7/24/16 with acute right neck and shoulder pain, followed by abdominal pain and distention.  On presentation to the ER, she was hypotensive.  Initial H&H was 12.8 and 39.5 which decreased to 9.5 and 29.0 on repeat.  CT of the abdomen showed 2 complex heterogeneous masses along the inferior aspect of the right hepatic lobe.  The larger more peripheral mass showed evidence of active hemorrhage.  There was a large subcapsular hematoma with free fluid in the pelvis.  Attempted embolization by interventional radiology was unsuccessful.  She was taken to the OR for exploratory laparotomy.  A large intra-abdominal blood collection was evacuated.  There was tumor in hepatic segment 6 with necrosis and rupture with some minimal oozing but no active bleeding.  Partial right hepatectomy was performed.  Intraoperative ultrasound of the liver showed no other suspicious lesions.  Alpha-fetoprotein level was normal.  Final pathology showed a 5.3 cm intermediate grade hepatocellular carcinoma with focal hemorrhagic infarction, fibrosis and hepatic capsular rupture.  Margins of resection were clear.  following  surgical recovery, she was discharged to home 7/29/16 on oral iron supplementation. Follow-up CT scan of the abdomen and pelvis 10/14/16 showed postoperative changes in the lateral inferior margin of the right hepatic lobe and a small fluid collection. There was no abnormal contrast enhancement or evidence of suspicious liver lesions. Repeat scans 2/14/17 showed stable postoperative changes with improved small perihepatic fluid collection.    CT of the abdomen and pelvis 7/24/17 showed postsurgical changes along the inferior right hepatic lobe with a new area of hypoattenuation inferiorly along the suture line measuring up to about 12 mm. This finding was not associated with contrast enhancement to suggest recurrence. Short-term interval follow-up was recommended in 3 months by Radiology. There were small stable hepatic cysts. There were no other findings suspicious for disease recurrence. Follow-up CT scan 10/25/17 showed significant increase of the hypodense lesion adjacent to the suture line in am. Aspect of the right hepatic lobe to 4.7 x 4.2 cm. There was minimal low-density change within the lesion suggesting central necrosis and subtle contrast enhancement. Alpha-fetoprotein level was normal. CT scan of the chest 11/3/17 showed no evidence of metastatic disease. There was a small hypodense nodule in the right thyroid. She was referred to surgical oncology and underwent resection of segment 6 of the liver 11/24/17 along with cholecystectomy. Final pathology showed a moderately differentiated hepatocellular carcinoma measuring 6.7 cm in greatest dimension. There is no evidence of vascular or perineural invasion. All resection margins were clear. Surveillance CT scans of the chest, abdomen and pelvis to/22/18 showed postoperative changes of the right hepatic lobe with no findings suspicious for disease recurrence. There was a stable, minimal right upper lobe subpleural nodule unchanged from her previous exams.  Films were reviewed in conjunction with Surgical Oncology.    Surveillance CT scan of the abdomen and pelvis 5/25/18 shows significant progression and recurrence of her disease with a dominant mass in segment 5 of the right hepatic lobe measuring 6.7 cm.  There was a new mass in the right lobe measuring 3.7 cm in segment 7 and also in segment 8 measuring 1.6 cm.  A new lesion in the left lobe of the liver measured 4.2 cm.  Alpha-fetoprotein level was normal at 4.5 ng/mL.  She was referred for a CT-guided biopsy of the liver 6/5/18.  Pathology confirmed hepatocellular carcinoma, well-differentiated.    Her case was discussed with Surgical Oncology locally.  She was referred on to interventional radiology for consideration of Y-90 radioembolization.  She successfully underwent Y-90 radioembolization to the right hepatic artery to a total dose of 335 mGy 6/19/18.  She was evaluated at [facility] 6/20/18 by Dr. Gigi Perkins for consideration of liver transplant.  Based on her disease burden, she did not meet criteria for liver transplantation.  Recommendations were to attempt to downstage her tumor burden through radioembolization and potentially revisit transplantation in the future depending on her response.  Her case was presented at interdisciplinary conference, and these recommendations were supported, along with addition of Nexavar for systemic treatment as tolerated.  Nexavar was initiated 8/2/18.     She underwent Y-90 radioembolization of the left hepatic artery 8/14/18 to a total dose of 256 mGy.  She developed progressive fatigue, body aches, and fever up to 101 with no evidence of infection.  She took Naprosyn with minimal improvement.  She developed an erythematous rash involving her face and pruritis of the scalp felt secondary Nexavar.  She also developed significant alopecia. Laboratory showed worsening of her transaminitis and anemia following the embolization. Nexavar treatment had to be discontinued  secondary to severe toxicities. Follow-up CT scan of the abdomen and pelvis 10/1/18 showed slight interval decrease of the dominant right hepatic mass measuring 5.8 x 5.4 cm (previous 6.7 x 6.0 cm). However, there was disease progression elsewhere. Dominant left hepatic lobe mass measured 10.1 x 6.9 cm (previous 4.3 x 3.3 cm) and a second mass in segment 4 measured 7.6 x 7.3 cm (previous 4.1 x 3.0 cm). Portal vein was patent. There was no abnormal adenopathy, fluid collections or evidence of metastatic disease. Additional treatment options were discussed includin) resuming Nexavar at a dose reduction, 2) second line therapy with Levatinib or Stivarga or 3) immunotherapy with Nivolumab. She was referred to the phase I program at [facility] to evaluate for potential clinical trial eligibility.    She was seen at [facility] 10/18/18 by Dr. Christopher for a second treatment opinion. She was not eligible for any current clinical trials at their institution. Her pathology was requested and submitted for next generation sequencing to determine if she had any targetable mutations. We discussed further treatment and both Dr. Christopher and I agreed that her best option for further therapy would be treatment with Nivolumab given the uncharacteristic nature and behavior of her multifocal hepatocellular carcinoma area did benefits, risks and toxicities were reviewed and discussed with the patient and her  and treatment was initiated 10/25/18.    Restaging CT of the abdomen 1/15/19 after 3 months of therapy showed significant disease regression.  The reference left hepatic lobe mass measured 4.8 x 2.0 cm (previously 10.1 x 6.9 cm), mass in segment IV was 4.3 x 2.0 cm (previous 7.6 x 7.3 cm) and a posterior right hepatic lobe mass was 2.7 x 2.1 cm (previous 5.2 x 4.3 cm). Her transaminases completely normalized and her anemia resolved. CT scans of the chest, abdomen and pelvis 19 showed continued decrease in size  of the multiple hepatic lesions. Representative left hepatic lobe mass measured 3.6 cm (previous 4.8 cm). There were no new lesions. Spleen was unremarkable. There was a 2 mm noncalcified nodule in the left upper lobe. There was no suspicious mediastinal or hilar adenopathy. CT scans of the chest, abdomen and pelvis 4/2/19 showed continued interval decrease of the hepatic metastatic lesions and a 2 mm noncalcified nodule in the left upper lobe with no new sites of disease. Serial CT scans have shown no significant disease progression.      Chief Complaint   I'm doing well      Interval History   She returns to the office today by herself for a four-month follow-up visit. She has now been on treatment with single agent Nivolumab for 2 years without any evidence of disease progression. She continues to tolerate treatment well with no evidence of immune-mediated toxicities. She has stable minor gum irritation felt secondary to her treatment. She has no abdominal symptoms or complaints. Appetite is good, her weight is stable. She reports no changes on her self breast examination. CT scan of the abdomen 10/16/20 showed macronodular contour of the liver. Hypodense areas containing dystrophic calcifications in the anterior segment of the right hepatic lobe in segment 4 were stable and unchanged in size and extent with no enhancing components. There is no abnormal adenopathy or ascites. Pancreas, kidneys and adrenal glands were unremarkable.      Review of Systems   Constitutional:  No significant weight loss, No fever, No weakness, No fatigue, No decreased activity.    Eye:  No blurring, No double vision, No visual disturbances.    Ear/Nose/Mouth/Throat:  No nasal congestion, No sore throat.    Respiratory:  No shortness of breath, No cough, No sputum production, No hemoptysis, No wheezing.    Cardiovascular:  No chest pain, No palpitations, No tachycardia.    Breast:  Bilateral mastectomies with ROCIO reconstruction.  No  changes on self-examination.    Gastrointestinal:  No melena or hematochezia, No nausea, No vomiting, No diarrhea, No constipation, No heartburn, No abdominal pain.    Genitourinary:  No dysuria, No hematuria, No change in urine stream.    Hematology/Lymphatics:  No bruising tendency, No bleeding tendency, No swollen lymph glands.    Musculoskeletal:  Joint pain (intermittent), No lower extremity edema, No neck pain.    Integumentary:  No rash, No pruritus, No skin lesion.    Neurologic:  Alert and oriented X4, No abnormal balance, No confusion, No numbness, No tingling, No headache.    Psychiatric:  No anxiety, No depression.       Health Status   Current medications:  (Selected)   Documented Medications  Documented  Caltrate 600 mg oral tablet: 600 mg = 1 tab(s), Oral, Daily, 0 Refill(s)  Intrarosa 6.5 mg vaginal insert: VAG, Daily  Multivitamins and Minerals oral tablet: 1 tab(s), Oral, Daily, 0 Refill(s)  Tums: 500 mg, Chewed, Daily, PRN, 0 Refill(s)  biotin 5000 mcg oral tablet, disintegratin,000 mcg = 1 tab(s), Oral, Daily, with meals  clobetasol 0.05% topical cream: TOP, BID  omega-3 polyunsaturated fatty acids (Fish Oil 1000mg Cap): 2 cap(s), Oral, BID, 0 Refill(s)  vitamin E 400 intl units oral capsule: 400 IntUnit = 1 cap(s), Oral, Daily      Histories     PMHx:  Arthritis, breast cancer    PSHx:  Tonsils, cholecystectomy, left foot surgery, hysterectomy/BSO, right breast lumpectomy, bilateral mastectomies with reconstruction, HCC resection x2, Y-90 embolization    SH:  Lifetime nonsmoker, occasional social alcohol use. Lives in [place] with her , retired .    FH:  Father, paternal uncle and maternal grandfather had kidney cancer. A great aunt had breast cancer.      Physical Examination   Vital Signs   10/21/2020 10:52 CDT     Temperature Oral          36.2 DegC                             Temperature Oral (calculated)             97.16 DegF                              Peripheral Pulse Rate     66 bpm                             Systolic Blood Pressure   121 mmHg                             Diastolic Blood Pressure  85 mmHg     General:  Alert and oriented, Well-developed white female in no acute distress.    Eye:  Pupils are equal, round and reactive to light, Extraocular movements are intact, Normal conjunctiva, Sclera nonicteric.    HENT:  Normocephalic, Oral mucosa is moist, No pharyngeal erythema, Mild irritation of the lateral upper gums bilaterally.    Neck:  Supple, Non-tender, No lymphadenopathy.    Respiratory:  Lungs clear to auscultation.    Cardiovascular:  Normal rate, Regular rhythm, No murmur.    Breast:  Bilateral mastectomies with ROCIO reconstruction. No palpable masses, skin changes or axillary nodes bilaterally.    Gastrointestinal:  Soft, Non-tender, Non-distended, Normal bowel sounds, Well-healed RUQ incision.  No palpable masses or organomegaly.    Lymphatics:  No lymphadenopathy neck, axilla, groin.    Musculoskeletal:  Normal range of motion, No tenderness, No lower extremity edema.    Integumentary:  Warm, Dry, No rash.    Neurologic:  Alert, Oriented, Normal sensory, Normal motor function, No focal deficits.    ECOG Performance Scale: 0 - Fully active; no performance restrictions.      Review / Management   Laboratory Results   Last 10 Days Lab Results : PowerNote Discrete Results   10/21/2020 10:38 CDT     WBC                       4.5 x10(3)/mcL                             RBC                       4.54 x10(6)/mcL                             Hgb                       14.1 gm/dL                             Hct                       41.6 %                             Platelet                  157 x10(3)/mcL                             MCV                       91.6 fL                             MCH                       31.1 pg  HI                             MCHC                      33.9 gm/dL                             RDW                       12.0  %                             MPV                       8.8 fL  LOW                             Abs Neut                  2.91 x10(3)/mcL                             NEUT%                     64.4 %  NA                             NEUT#                     2.9 x10(3)/mcL                             LYMPH%                    20.8 %  NA                             LYMPH#                    0.9 x10(3)/mcL                             MONO%                     7.5 %  NA                             MONO#                     0.3 x10(3)/mcL                             EOS%                      6.4 %  NA                             EOS#                      0.3 x10(3)/mcL                             BASO%                     0.7 %  NA                             BASO#                     0.0 x10(3)/mcL           Impression and Plan   IMPRESSION:  Recurrent multifocal hepatocellular carcinoma   Stage IIA triple negative breast cancer 10/13 - KASSI  Positive BRCA2 mutation    PLAN:  Patient continues to do well with no clinical or radiographic evidence of disease progression.  Continue Nivolumab 480 mg every 4 weeks.  Given her long-term disease stability, will change her surveillance imaging to every 6 months.  RTC in 6 months for a follow-up visit and clinical exam with a CT of the abdomen.      HERIBERTO SCHNEIDER MD    Other Physicians  Dr. Arthur Patel ()  Dr. Richard Christopher ([facility])      Professional Services   I, Alycia Lombardi LPN, acted solely as a scribe for and in the presence of, Dr. Heriberto Schneider, who performed the service.

## 2022-04-30 NOTE — PROGRESS NOTES
Patient:   Maci Lewis            MRN: [id number]            FIN: [id number]               Age:   63 years     Sex:  Female     :  1955   Associated Diagnoses:   None   Author:   Heriberto Schneider MD      Visit Information   Diagnosis: Recurrent multifocal hepatocellular carcinoma                    Stage IIA right breast cancer 10/13 (T2 N0 M0), 2.4 cm, grade III, ER/[place] neg, Her-2 neg                    Positive BRCA2 mutation        S/p bilateral mastectomies and BSO     Current Treatment: Nivolumab Q 4 weeks (started 10/25/18)  Treatment History: AC x4 () -->weekly Taxol x12 () ---> XRT x 14 days ---> bilateral mastectomies; Partial right hepatectomy ; right hepatic segmentectomy ; Y-90 RHA 18; Y-90 LHA 18; Nexavar -18    Clinical History: Patient had a normal annual mammogram . She noted a nontender lump in the upper inner quadrant of the left breast on self-examination. Mammogram and ultrasound were suspicious for malignancy. Biopsy was positive for infiltrating ductal carcinoma. She underwent lumpectomy and sentinel lymph node dissection 10/28/13. Final pathology showed a 2.5 cm grade 3 infiltrating ductal carcinoma with clear margins, no lymphovascular invasion. 6 resected lymph nodes were negative for involvement. Estrogen and progesterone receptors were negative and HER-2 was negative for overexpression by FISH. There was no family history of breast cancer.  Adjuvant treatment was recommended with a regimen of Adriamycin/Cytoxan (AC) for 4 cycles every 3 weeks followed by weekly Taxol for 12 weeks and postlumpectomy radiation.  Baseline MUGA scan 13 showed normal wall motion with an LVEF of 76%.  She underwent placement of a Mediport and chemotherapy was initiated 13.  She completed 4 cycles of adjuvant AC chemotherapy with Neulasta support 14.  She did not require dose reduction or treatment delay.  Weekly Taxol was initiated  3/21/14. Taxol was dose reduced by 20% beginning with cycle #5 for development of mucositis and rash to bilateral hands. She completed her adjuvant chemotherapy 6/6/14.    She initiated adjuvant radiation therapy to the right breast. She subsequently underwent genetic counseling and testing. She tested positive for a BRCA2 gene mutation. Following a lengthy discussion, she made a decision to undergo bilateral mastectomies and further radiation was held. She completed 14 fractions of treatment.  She underwent bilateral mastectomies and ROCIO reconstruction 12/8/14. Breast pathology from the surgery was unremarkable.  A screening abdominal ultrasound 10/7/14 showed an unremarkable pancreas, liver and bile ducts.  She underwent prophylactic bilateral salpingo-oophorectomy 6/11/15.    She presented to the hospital 7/24/16 with acute right neck and shoulder pain, followed by abdominal pain and distention.  On presentation to the ER, she was hypotensive.  Initial H&H was 12.8 and 39.5 which decreased to 9.5 and 29.0 on repeat.  CT of the abdomen showed 2 complex heterogeneous masses along the inferior aspect of the right hepatic lobe.  The larger more peripheral mass showed evidence of active hemorrhage.  There was a large subcapsular hematoma with free fluid in the pelvis.  Attempted embolization by interventional radiology was unsuccessful.  She was taken to the OR for exploratory laparotomy.  A large intra-abdominal blood collection was evacuated.  There was tumor in hepatic segment 6 with necrosis and rupture with some minimal oozing but no active bleeding.  Partial right hepatectomy was performed.  Intraoperative ultrasound of the liver showed no other suspicious lesions.  Alpha-fetoprotein level was normal.  Final pathology showed a 5.3 cm intermediate grade hepatocellular carcinoma with focal hemorrhagic infarction, fibrosis and hepatic capsular rupture.  Margins of resection were clear.  following surgical  recovery, she was discharged to home 7/29/16 on oral iron supplementation. Follow-up CT scan of the abdomen and pelvis 10/14/16 showed postoperative changes in the lateral inferior margin of the right hepatic lobe and a small fluid collection. There was no abnormal contrast enhancement or evidence of suspicious liver lesions. Repeat scans 2/14/17 showed stable postoperative changes with improved small perihepatic fluid collection.    CT of the abdomen and pelvis 7/24/17 showed postsurgical changes along the inferior right hepatic lobe with a new area of hypoattenuation inferiorly along the suture line measuring up to about 12 mm. This finding was not associated with contrast enhancement to suggest recurrence. Short-term interval follow-up was recommended in 3 months by Radiology. There were small stable hepatic cysts. There were no other findings suspicious for disease recurrence. Follow-up CT scan 10/25/17 showed significant increase of the hypodense lesion adjacent to the suture line in am. Aspect of the right hepatic lobe to 4.7 x 4.2 cm. There was minimal low-density change within the lesion suggesting central necrosis and subtle contrast enhancement. Alpha-fetoprotein level was normal. CT scan of the chest 11/3/17 showed no evidence of metastatic disease. There was a small hypodense nodule in the right thyroid. She was referred to surgical oncology and underwent resection of segment 6 of the liver 11/24/17 along with cholecystectomy. Final pathology showed a moderately differentiated hepatocellular carcinoma measuring 6.7 cm in greatest dimension. There is no evidence of vascular or perineural invasion. All resection margins were clear. Surveillance CT scans of the chest, abdomen and pelvis to/22/18 showed postoperative changes of the right hepatic lobe with no findings suspicious for disease recurrence. There was a stable, minimal right upper lobe subpleural nodule unchanged from her previous exams. Films were  reviewed in conjunction with Surgical Oncology.    Surveillance CT scan of the abdomen and pelvis 5/25/18 shows significant progression and recurrence of her disease with a dominant mass in segment 5 of the right hepatic lobe measuring 6.7 cm.  There was a new mass in the right lobe measuring 3.7 cm in segment 7 and also in segment 8 measuring 1.6 cm.  A new lesion in the left lobe of the liver measured 4.2 cm.  Alpha-fetoprotein level was normal at 4.5 ng/mL.  She was referred for a CT-guided biopsy of the liver 6/5/18.  Pathology confirmed hepatocellular carcinoma, well-differentiated.    Her case was discussed with Surgical Oncology locally.  She was referred on to interventional radiology for consideration of Y-90 radioembolization.  She successfully underwent Y-90 radioembolization to the right hepatic artery to a total dose of 335 mGy 6/19/18.  She was evaluated at [facility] 6/20/18 by Dr. Gigi Perkins for consideration of liver transplant.  Based on her disease burden, she did not meet criteria for liver transplantation.  Recommendations were to attempt to downstage her tumor burden through radioembolization and potentially revisit transplantation in the future depending on her response.  Her case was presented at interdisciplinary conference, and these recommendations were supported, along with addition of Nexavar for systemic treatment as tolerated.  Nexavar was initiated 8/2/18.     She underwent Y-90 radioembolization of the left hepatic artery 8/14/18 to a total dose of 256 mGy.  She developed progressive fatigue, body aches, and fever up to 101 with no evidence of infection.  She took Naprosyn with minimal improvement.  She developed an erythematous rash involving her face and pruritis of the scalp felt secondary Nexavar.  She also developed significant alopecia. Laboratory showed worsening of her transaminitis and anemia following the embolization. Nexavar treatment had to be discontinued secondary to  severe toxicities. Follow-up CT scan of the abdomen and pelvis 10/1/18 showed slight interval decrease of the dominant right hepatic mass measuring 5.8 x 5.4 cm (previous 6.7 x 6.0 cm). However, there was disease progression elsewhere. Dominant left hepatic lobe mass measured 10.1 x 6.9 cm (previous 4.3 x 3.3 cm) and a second mass in segment 4 measured 7.6 x 7.3 cm (previous 4.1 x 3.0 cm). Portal vein was patent. There was no abnormal adenopathy, fluid collections or evidence of metastatic disease. Additional treatment options were discussed includin) resuming Nexavar at a dose reduction, 2) second line therapy with Levatinib or Stivarga or 3) immunotherapy with Nivolumab. She was referred to the phase I program at [facility] to evaluate for potential clinical trial eligibility.    She was seen at [facility] 10/18/18 by Dr. Christopher for a second treatment opinion. She was not eligible for any current clinical trials at their institution. Her pathology was requested and submitted for next generation sequencing to determine if she had any targetable mutations. We discussed further treatment and both Dr. Christopher and I agreed that her best option for further therapy would be treatment with Nivolumab given the uncharacteristic nature and behavior of her multifocal hepatocellular carcinoma area did benefits, risks and toxicities were reviewed and discussed with the patient and her  and treatment was initiated 10/25/18.    Restaging CT of the abdomen 1/15/19 after 3 months of therapy showed significant disease regression.  The reference left hepatic lobe mass measured 4.8 x 2.0 cm (previously 10.1 x 6.9 cm), mass in segment 4 was 4.3 x 2.0 cm (previous 7.6 x 7.3 cm) and a posterior right hepatic lobe mass was 2.7 x 2.1 cm (previous 5.2 x 4.3 cm). Her transaminases completely normalized and her anemia resolved.         Chief Complaint   I feel really good      Interval History   She returns to the office  today by herself for a four-week follow-up visit. She remains on maintenance therapy with Nivolumab every 4 weeks. She continues to tolerate treatment well. She does not have any significant symptoms suggestive of autoimmune mediated side effects. She has no evidence of a rash. Her vaginal symptoms are being managed by Gynecology and have improved. She has no abdominal symptoms or complaints, specifically no pain.         Review of Systems   Constitutional:  No fever, No chills, No weakness, No fatigue, No decreased activity.    Eye:  No blurring, No double vision, No visual disturbances.    Ear/Nose/Mouth/Throat:  No nasal congestion, No sore throat.    Respiratory:  No shortness of breath, No cough, No sputum production, No hemoptysis, No wheezing.    Cardiovascular:  No chest pain, No palpitations, No tachycardia.    Breast:  Bilateral mastectomies with ROCIO reconstruction.  No changes on self-examination.    Gastrointestinal:  No nausea, No vomiting, No diarrhea, No constipation, No heartburn, No abdominal pain.    Genitourinary:  No dysuria, No hematuria, No change in urine stream.    Hematology/Lymphatics:  No bruising tendency, No bleeding tendency, No swollen lymph glands.    Musculoskeletal:  No lower extremity edema, No neck pain, No joint pain.    Integumentary:  No rash, No pruritus, No skin lesion.    Neurologic:  Alert and oriented X4, No abnormal balance, No confusion, No numbness, No tingling, No headache.    Psychiatric:  No anxiety, No depression.       Health Status   Current medications:  (Selected)   Documented Medications  Documented  Caltrate 600 mg oral tablet: 600 mg = 1 tab(s), Oral, Daily, 0 Refill(s)  Multivitamins and Minerals oral tablet: 1 tab(s), Oral, Daily, 0 Refill(s)  Pantoprazole Sod Dr 40 Mg Tab: 40 mg = 1 tab(s), Oral, Daily  biotin 5000 mcg oral tablet, disintegratin,000 mcg = 1 tab(s), Oral, Daily, with meals  clobetasol 0.05% topical cream: TOP, BID  omega-3  polyunsaturated fatty acids (Fish Oil 1000mg Cap): 2 cap(s), Oral, BID, 0 Refill(s)      Histories   Past Medical History:    Resolved  Arthritis ([id number]):  Resolved.  Breast cancer ([id number]):  Resolved.  Hepatocellular carcinoma ([id number]):  Resolved.   Family History:    Kidney cancer, primary, with metastasis from kidney to other site....  Father  , Paternal uncle and maternal grandfather also had kidney cancer   Procedure history:    Exploration Laparotomy (Right) on 11/24/2017 at 61 Years.  Comments:  11/24/2017 12:24 - Mile Ortega RN  auto-populated from documented surgical case  Hepatic Resection on 11/24/2017 at 61 Years.  Comments:  11/24/2017 12:24 - Mile Ortega RN  auto-populated from documented surgical case  Cholecystectomy (.) on 11/24/2017 at 61 Years.  Comments:  11/24/2017 12:31 - Mile Ortega RN  auto-populated from documented surgical case  Hepatic Resection (None) on 7/25/2016 at 60 Years.  Comments:  7/25/2016 13:05 - Antonieta MURPHY, Carina BEARD  auto-populated from documented surgical case  Right breast lumpectomy.  Tonsillectomy.  Left foot bone spur removal.  Jose mastectomy with flap reconstruction.  Partial hysterectomy.  breast reconstruction revision / fat graph 11/16/2015.  Y90 - Yttrium 90 ([id number]).  Liposuction of abdomen ([id number]).      Gynecologic history   Menstrual cycle: Menarche age 11, first pregnancy at 30, + breast fed, menopause 46, HRT x 2 yrs.   Social History        Social & Psychosocial Habits    Alcohol  08/01/2018  Use: Past    Type: Beer, Liquor, Wine    Frequency: 1-2 times per week    Employment/School  12/12/2013  Status: Employed    Description:     Home/Environment  12/12/2013  Lives with: Spouse    Nutrition/Health  10/05/2015  Type of diet: Regular    Substance Abuse  05/21/2015 Risk Assessment: Denies Substance Abuse    10/05/2015  Use: Never    Tobacco  12/12/2013 Risk Assessment: Denies Tobacco Use    02/14/2019   Use: Never (less than 100 in l    Patient Wants Consult For Cessation Counseling N/A    02/15/2019  Use: Never (less than 100 in l    Patient Wants Consult For Cessation Counseling N/A    03/13/2019  Use: Never (less than 100 in l    Patient Wants Consult For Cessation Counseling N/A.        Physical Examination   Vital Signs   3/13/2019 12:48 CDT      Temperature Oral          36.7 DegC                             Temperature Oral (calculated)             98.06 DegF                             Peripheral Pulse Rate     84 bpm                             Systolic Blood Pressure   114 mmHg                             Diastolic Blood Pressure  78 mmHg     General:  Alert and oriented, Well-developed white female in no acute distress.    Eye:  Pupils are equal, round and reactive to light, Extraocular movements are intact, Normal conjunctiva, Sclera nonicteric.    HENT:  Normocephalic (+ Patchy alopecia), Oral mucosa is moist, No pharyngeal erythema.    Neck:  Supple, Non-tender, No lymphadenopathy.    Respiratory:  Lungs are clear to auscultation, Respirations are non-labored, Breath sounds are equal.    Cardiovascular:  Normal rate, Regular rhythm, No murmur.    Breast:  Not examined today.    Gastrointestinal:  Soft, Non-tender, Non-distended, Normal bowel sounds, Well-healed RUQ incision.  No palpable masses or organomegaly.    Lymphatics:  No lymphadenopathy neck, axilla, groin.    Musculoskeletal:  Normal range of motion, No tenderness, No lower extremity edema.    Integumentary:  Warm, Dry, No rash.    Neurologic:  Alert, Oriented, Normal sensory, Normal motor function, No focal deficits.    ECOG Performance Scale: 1- Strenuous physical activity restricted; fully ambulatory and able to carry out light work.      Review / Management   Laboratory Results   Today's Lab Results : PowerNote Discrete Results   3/13/2019 12:37 CDT      Liver Interp                  3/13/2019 12:45 CDT      POC TCO2                   26.0 mmol/L                             POC Hb                    14.3 mg/dL                             POC Hct                   42.0 %                             POC Sodium                142 mmol/L                             POC Potassium             4.3 mmol/L                             POC Chloride              107 mmol/L                             POC Ion Calcium           1.15 mmol/L                             POC Glucose               107 mg/dL  HI                             POC BUN                   16.0 mg/dL                             POC Creatinine            0.8 mg/dL                             POC AGAP                  15.0  NA    3/13/2019 12:37 CDT      WBC                       4.8 x10(3)/mcL                             RBC                       4.71 x10(6)/mcL                             Hgb                       14.2 gm/dL                             Hct                       43.7 %                             Platelet                  165 x10(3)/mcL                             MCV                       92.8 fL                             MCH                       30.1 pg                             MCHC                      32.5 gm/dL  LOW                             RDW                       14.0 %                             MPV                       8.8 fL  LOW                             Abs Neut                  3.50 x10(3)/mcL                             NEUT%                     73.8 %  NA                             NEUT#                     3.5 x10(3)/mcL                             LYMPH%                    12.4 %  NA                             LYMPH#                    0.6 x10(3)/mcL                             MONO%                     10.5 %  NA                             MONO#                     0.5 x10(3)/mcL                             EOS%                      2.9 %  NA                             EOS#                      0.1 x10(3)/mcL                              BASO%                     0.4 %  NA                             BASO#                     0.0 x10(3)/mcL    3/13/2019 12:37 CDT      Bili Total                0.6 mg/dL                             Bili Direct               0.10 mg/dL                             Bili Indirect             0.50 mg/dL                             AST                       28 unit/L                             ALT                       37 unit/L                             Alk Phos                  106 unit/L                             Total Protein             7.4 gm/dL                             Albumin Lvl               3.50 gm/dL                             TSH                       1.120 mIU/L           Impression and Plan   IMPRESSION:  Recurrent multifocal hepatocellular carcinoma   Stage IIA triple negative breast cancer 10/13 - KASSI  Positive BRCA2 mutation    PLAN:  Continue treatment with Nivolumab 480 mg every 4 weeks.  RTC in 4 weeks for a follow-up visit with restaging CT scans of the chest and abdomen.    HERIBERTO SCHNEIDER M.D.    Other Physicians  Dr. Arthur Patel ()  Dr. Richard Christopher ([facility])      Professional Services   I, Alycia Lmobardi LPN,  acted solely as a scribe for and in the presence of, Dr. Heriberto Schneider, who performed the service.

## 2022-04-30 NOTE — PROGRESS NOTES
Patient:   Maci Lewis            MRN: [id number]            FIN: [id number]               Age:   63 years     Sex:  Female     :  1955   Associated Diagnoses:   None   Author:   Jack BEE, Elinor ATKINS      Visit Information   Diagnosis: Recurrent multifocal hepatocellular carcinoma                    Stage IIA right breast cancer 10/13 (T2 N0 M0), 2.4 cm, grade III, ER/OK neg, Her-2 neg                    Positive BRCA2 mutation        S/p bilateral mastectomies and BSO     Current Treatment: Nivolumab Q 4 weeks (started 10/25/18)  Treatment History: AC x4 () -->weekly Taxol x12 () ---> XRT x 14 days ---> bilateral mastectomies; Partial right hepatectomy ; right hepatic segmentectomy ; Y-90 RHA 18; Y-90 LHA 18; Nexavar -18    Clinical History: Patient had a normal annual mammogram . She noted a nontender lump in the upper inner quadrant of the left breast on self-examination. Mammogram and ultrasound were suspicious for malignancy. Biopsy was positive for infiltrating ductal carcinoma. She underwent lumpectomy and sentinel lymph node dissection 10/28/13. Final pathology showed a 2.5 cm grade 3 infiltrating ductal carcinoma with clear margins, no lymphovascular invasion. 6 resected lymph nodes were negative for involvement. Estrogen and progesterone receptors were negative and HER-2 was negative for overexpression by FISH. There was no family history of breast cancer.  Adjuvant treatment was recommended with a regimen of Adriamycin/Cytoxan (AC) for 4 cycles every 3 weeks followed by weekly Taxol for 12 weeks and postlumpectomy radiation.  Baseline MUGA scan 13 showed normal wall motion with an LVEF of 76%.  She underwent placement of a Mediport and chemotherapy was initiated 13.  She completed 4 cycles of adjuvant AC chemotherapy with Neulasta support 14.  She did not require dose reduction or treatment delay.  Weekly Taxol was  initiated 3/21/14. Taxol was dose reduced by 20% beginning with cycle #5 for development of mucositis and rash to bilateral hands. She completed her adjuvant chemotherapy 6/6/14.    She initiated adjuvant radiation therapy to the right breast. She subsequently underwent genetic counseling and testing. She tested positive for a BRCA2 gene mutation. Following a lengthy discussion, she made a decision to undergo bilateral mastectomies and further radiation was held. She completed 14 fractions of treatment.  She underwent bilateral mastectomies and ROCIO reconstruction 12/8/14. Breast pathology from the surgery was unremarkable.  A screening abdominal ultrasound 10/7/14 showed an unremarkable pancreas, liver and bile ducts.  She underwent prophylactic bilateral salpingo-oophorectomy 6/11/15.    She presented to the hospital 7/24/16 with acute right neck and shoulder pain, followed by abdominal pain and distention.  On presentation to the ER, she was hypotensive.  Initial H&H was 12.8 and 39.5 which decreased to 9.5 and 29.0 on repeat.  CT of the abdomen showed 2 complex heterogeneous masses along the inferior aspect of the right hepatic lobe.  The larger more peripheral mass showed evidence of active hemorrhage.  There was a large subcapsular hematoma with free fluid in the pelvis.  Attempted embolization by interventional radiology was unsuccessful.  She was taken to the OR for exploratory laparotomy.  A large intra-abdominal blood collection was evacuated.  There was tumor in hepatic segment 6 with necrosis and rupture with some minimal oozing but no active bleeding.  Partial right hepatectomy was performed.  Intraoperative ultrasound of the liver showed no other suspicious lesions.  Alpha-fetoprotein level was normal.  Final pathology showed a 5.3 cm intermediate grade hepatocellular carcinoma with focal hemorrhagic infarction, fibrosis and hepatic capsular rupture.  Margins of resection were clear.  following  surgical recovery, she was discharged to home 7/29/16 on oral iron supplementation. Follow-up CT scan of the abdomen and pelvis 10/14/16 showed postoperative changes in the lateral inferior margin of the right hepatic lobe and a small fluid collection. There was no abnormal contrast enhancement or evidence of suspicious liver lesions. Repeat scans 2/14/17 showed stable postoperative changes with improved small perihepatic fluid collection.    CT of the abdomen and pelvis 7/24/17 showed postsurgical changes along the inferior right hepatic lobe with a new area of hypoattenuation inferiorly along the suture line measuring up to about 12 mm. This finding was not associated with contrast enhancement to suggest recurrence. Short-term interval follow-up was recommended in 3 months by Radiology. There were small stable hepatic cysts. There were no other findings suspicious for disease recurrence. Follow-up CT scan 10/25/17 showed significant increase of the hypodense lesion adjacent to the suture line in am. Aspect of the right hepatic lobe to 4.7 x 4.2 cm. There was minimal low-density change within the lesion suggesting central necrosis and subtle contrast enhancement. Alpha-fetoprotein level was normal. CT scan of the chest 11/3/17 showed no evidence of metastatic disease. There was a small hypodense nodule in the right thyroid. She was referred to surgical oncology and underwent resection of segment 6 of the liver 11/24/17 along with cholecystectomy. Final pathology showed a moderately differentiated hepatocellular carcinoma measuring 6.7 cm in greatest dimension. There is no evidence of vascular or perineural invasion. All resection margins were clear. Surveillance CT scans of the chest, abdomen and pelvis to/22/18 showed postoperative changes of the right hepatic lobe with no findings suspicious for disease recurrence. There was a stable, minimal right upper lobe subpleural nodule unchanged from her previous exams.  Films were reviewed in conjunction with Surgical Oncology.    Surveillance CT scan of the abdomen and pelvis 5/25/18 shows significant progression and recurrence of her disease with a dominant mass in segment 5 of the right hepatic lobe measuring 6.7 cm.  There was a new mass in the right lobe measuring 3.7 cm in segment 7 and also in segment 8 measuring 1.6 cm.  A new lesion in the left lobe of the liver measured 4.2 cm.  Alpha-fetoprotein level was normal at 4.5 ng/mL.  She was referred for a CT-guided biopsy of the liver 6/5/18.  Pathology confirmed hepatocellular carcinoma, well-differentiated.    Her case was discussed with Surgical Oncology locally.  She was referred on to interventional radiology for consideration of Y-90 radioembolization.  She successfully underwent Y-90 radioembolization to the right hepatic artery to a total dose of 335 mGy 6/19/18.  She was evaluated at [facility] 6/20/18 by Dr. Gigi Perkins for consideration of liver transplant.  Based on her disease burden, she did not meet criteria for liver transplantation.  Recommendations were to attempt to downstage her tumor burden through radioembolization and potentially revisit transplantation in the future depending on her response.  Her case was presented at interdisciplinary conference, and these recommendations were supported, along with addition of Nexavar for systemic treatment as tolerated.  Nexavar was initiated 8/2/18.     She underwent Y-90 radioembolization of the left hepatic artery 8/14/18 to a total dose of 256 mGy.  She developed progressive fatigue, body aches, and fever up to 101 with no evidence of infection.  She took Naprosyn with minimal improvement.  She developed an erythematous rash involving her face and pruritis of the scalp felt secondary Nexavar.  She also developed significant alopecia. Laboratory showed worsening of her transaminitis and anemia following the embolization. Nexavar treatment had to be discontinued  secondary to severe toxicities. Follow-up CT scan of the abdomen and pelvis 10/1/18 showed slight interval decrease of the dominant right hepatic mass measuring 5.8 x 5.4 cm (previous 6.7 x 6.0 cm). However, there was disease progression elsewhere. Dominant left hepatic lobe mass measured 10.1 x 6.9 cm (previous 4.3 x 3.3 cm) and a second mass in segment 4 measured 7.6 x 7.3 cm (previous 4.1 x 3.0 cm). Portal vein was patent. There was no abnormal adenopathy, fluid collections or evidence of metastatic disease. Additional treatment options were discussed includin) resuming Nexavar at a dose reduction, 2) second line therapy with Levatinib or Stivarga or 3) immunotherapy with Nivolumab. She was referred to the phase I program at [facility] to evaluate for potential clinical trial eligibility.    She was seen at [facility] 10/18/18 by Dr. Christopher for a second treatment opinion. She was not eligible for any current clinical trials at their institution. Her pathology was requested and submitted for next generation sequencing to determine if she had any targetable mutations. We discussed further treatment and both Dr. Christopher and I agreed that her best option for further therapy would be treatment with Nivolumab given the uncharacteristic nature and behavior of her multifocal hepatocellular carcinoma area did benefits, risks and toxicities were reviewed and discussed with the patient and her  and treatment was initiated 10/25/18.    Restaging CT of the abdomen 1/15/19 after 3 months of therapy showed significant disease regression.  The reference left hepatic lobe mass measured 4.8 x 2.0 cm (previously 10.1 x 6.9 cm), mass in segment 4 was 4.3 x 2.0 cm (previous 7.6 x 7.3 cm) and a posterior right hepatic lobe mass was 2.7 x 2.1 cm (previous 5.2 x 4.3 cm). Her transaminases completely normalized and her anemia resolved. CT scans of the chest, abdomen and pelvis 19 showed continued decrease in size of  "the multiple hepatic lesions. Representative left hepatic lobe mass measured 3.6 cm (previous 4.8 cm). There were no new lesions. Spleen was unremarkable. There is a 2 mm noncalcified nodule in the left upper lobe. There is no suspicious mediastinal or hilar adenopathy.      Chief Complaint   "Feeling great"      Interval History   Patient is here today by herself for a four-week follow-up visit.  She feels excellent.  She and her  will be traveling to visit friends in [place] next week.  She is looking forward to the travel as this was a trip that had to be postponed last year when she was found to have recurrent disease.  She continues on treatment with Nivolumab every 4 weeks.  She is tolerating treatment well with no evidence of immune mediated side effects.  She has a very mild facial rash which she is managing with over-the-counter creams.  She denies any abdominal pain or nausea.  LFTs remain normal.  Previous CT imaging 4/19 showed ongoing treatment response with decrease in size of the multiple hepatic lesions.      Review of Systems   Constitutional:  No significant weight loss, No fever, No chills, No weakness, No fatigue, No decreased activity.    Eye:  No blurring, No double vision, No visual disturbances.    Ear/Nose/Mouth/Throat:  No nasal congestion, No sore throat.    Respiratory:  No shortness of breath, No cough, No sputum production, No hemoptysis, No wheezing.    Cardiovascular:  No chest pain, No palpitations, No tachycardia.    Breast:  Bilateral mastectomies with ROCIO reconstruction.  No changes on self-examination.    Gastrointestinal:  No nausea, No vomiting, No diarrhea, No constipation, No heartburn, No abdominal pain.    Genitourinary:  No dysuria, No hematuria, No change in urine stream.    Hematology/Lymphatics:  No bruising tendency, No bleeding tendency, No swollen lymph glands.    Musculoskeletal:  No lower extremity edema, No neck pain, No joint pain.    Integumentary:  " Rash, Dryness (facial), No pruritus, No skin lesion.    Neurologic:  Alert and oriented X4, No abnormal balance, No confusion, No numbness, No tingling, No headache.    Psychiatric:  No anxiety, No depression.       Health Status   Allergies:    Allergic Reactions (Selected)  No Known Allergies,    Allergies (1) Active Reaction  No Known Allergies None Documented   Current medications:  (Selected)   Prescriptions  Prescribed  Diflucan 150 mg oral tablet: 150 mg = 1 tab(s), Oral, q3day, one tab oral daily X 3 days, # 3 tab(s), 0 Refill(s), Pharmacy: [place], [place]  Documented Medications  Documented  Caltrate 600 mg oral tablet: 600 mg = 1 tab(s), Oral, Daily, 0 Refill(s)  Multivitamins and Minerals oral tablet: 1 tab(s), Oral, Daily, 0 Refill(s)  Zantac 150 oral tablet: 150 mg = 1 tab(s), Oral, Once a day (at bedtime), # 30 tab(s), 0 Refill(s)  biotin 5000 mcg oral tablet, disintegratin,000 mcg = 1 tab(s), Oral, Daily, with meals  clobetasol 0.05% topical cream: TOP, BID  omega-3 polyunsaturated fatty acids (Fish Oil 1000mg Cap): 2 cap(s), Oral, BID, 0 Refill(s)      Histories   Past Medical History:    Resolved  Arthritis ([id number]):  Resolved.  Breast cancer ([id number]):  Resolved.  Hepatocellular carcinoma ([id number]):  Resolved.   Family History:    Kidney cancer, primary, with metastasis from kidney to other site....  Father  , Paternal uncle and maternal grandfather also had kidney cancer   Procedure history:    Exploration Laparotomy (Right) on 2017 at 61 Years.  Comments:  2017 12:24 - Mile Ortega RN  auto-populated from documented surgical case  Hepatic Resection on 2017 at 61 Years.  Comments:  2017 12:24 - Mile Ortega RN  auto-populated from documented surgical case  Cholecystectomy (.) on 2017 at 61 Years.  Comments:  2017 12:31 - iMle Ortega RN  auto-populated from documented surgical case  Hepatic Resection (None) on  7/25/2016 at 60 Years.  Comments:  7/25/2016 13:05 - Antonieta MURPHY, Carina BEARD  auto-populated from documented surgical case  Right breast lumpectomy.  Tonsillectomy.  Left foot bone spur removal.  Jose mastectomy with flap reconstruction.  Partial hysterectomy.  breast reconstruction revision / fat graph 11/16/2015.  Y90 - Yttrium 90 ([id number]).  Liposuction of abdomen ([id number]).      Gynecologic history   Menstrual cycle: Menarche age 11, first pregnancy at 30, + breast fed, menopause 46, HRT x 2 yrs.   Social History        Social & Psychosocial Habits    Alcohol  08/01/2018  Use: Past    Type: Beer, Liquor, Wine    Frequency: 1-2 times per week    Employment/School  12/12/2013  Status: Employed    Description:     Home/Environment  12/12/2013  Lives with: Spouse    Nutrition/Health  10/05/2015  Type of diet: Regular    Substance Abuse  05/21/2015 Risk Assessment: Denies Substance Abuse    10/05/2015  Use: Never    Tobacco  12/12/2013 Risk Assessment: Denies Tobacco Use    04/10/2019  Use: Never (less than 100 in l    Patient Wants Consult For Cessation Counseling N/A    05/08/2019  Use: Never (less than 100 in l    Patient Wants Consult For Cessation Counseling N/A    05/09/2019  Use: Never (less than 100 in l    Patient Wants Consult For Cessation Counseling No    06/05/2019  Use: Never (less than 100 in l    Patient Wants Consult For Cessation Counseling N/A.        Physical Examination   Vital Signs   6/5/2019 10:29 CDT       Temperature Oral          37.3 DegC                             Temperature Oral (calculated)             99.14 DegF                             Peripheral Pulse Rate     74 bpm                             Systolic Blood Pressure   115 mmHg                             Diastolic Blood Pressure  81 mmHg     Measurements from flowsheet : Measurements   6/5/2019 10:29 CDT       Weight Dosing             67 kg                             Weight Measured           67 kg                              Weight Measured and Calculated in Lbs     147.71 lb                             Height/Length Dosing      167 cm                             Height/Length Measured    167 cm                             BSA Measured              1.76 m2                             Body Mass Index Measured  24.02 kg/m2     General:  Alert and oriented, Well-developed white female in no acute distress.    Eye:  Pupils are equal, round and reactive to light, Extraocular movements are intact, Normal conjunctiva, Sclera nonicteric.    HENT:  Normocephalic, Oral mucosa is moist, No pharyngeal erythema.    Neck:  Supple, Non-tender, No lymphadenopathy.    Respiratory:  Lungs are clear to auscultation, Respirations are non-labored, Breath sounds are equal.    Cardiovascular:  Normal rate, Regular rhythm, No murmur.    Gastrointestinal:  Soft, Non-tender, Non-distended, Normal bowel sounds, Well-healed RUQ incision.  No palpable masses or organomegaly.    Lymphatics:  No lymphadenopathy neck, axilla, groin.    Musculoskeletal:  Normal range of motion, No tenderness, No lower extremity edema.    Integumentary:  Warm, Dry, Faint papular rash involving lower lip and chin, dryness of forehead.    Neurologic:  Alert, Oriented, Normal sensory, Normal motor function, No focal deficits.    ECOG Performance Scale: 0 - Fully active; no performance restrictions.      Review / Management   Results review:  Lab results   6/5/2019 10:19 CDT       POC Sodium                142 mmol/L                             POC Potassium             4.0 mmol/L                             POC Chloride              102 mmol/L                             POC Ion Calcium           1.20 mmol/L                             POC Glucose               106 mg/dL  HI                             POC BUN                   17.0 mg/dL                             POC Creatinine            0.9 mg/dL                             POC AGAP                  17.0  NA                              POC Hb                    13.9 mg/dL                             POC Hct                   41.0 %                             POC TCO2                  29.0 mmol/L    6/5/2019 10:12 CDT       WBC                       3.2 x10(3)/mcL  LOW                             RBC                       4.49 x10(6)/mcL                             Hgb                       13.8 gm/dL                             Hct                       43.4 %                             Platelet                  154 x10(3)/mcL                             MCV                       96.7 fL  HI                             MCH                       30.7 pg                             MCHC                      31.8 gm/dL  LOW                             RDW                       12.4 %                             MPV                       8.8 fL  LOW                             Abs Neut                  2.05 x10(3)/mcL  LOW                             NEUT%                     64.7 %  NA                             NEUT#                     2.0 x10(3)/mcL  LOW                             LYMPH%                    18.3 %  NA                             LYMPH#                    0.6 x10(3)/mcL                             MONO%                     11.7 %  NA                             MONO#                     0.4 x10(3)/mcL                             EOS%                      5.0 %  NA                             EOS#                      0.2 x10(3)/mcL                             BASO%                     0.3 %  NA                             BASO#                     0.0 x10(3)/mcL  .       Impression and Plan   IMPRESSION:  Recurrent multifocal hepatocellular carcinoma   Stage IIA triple negative breast cancer 10/13 - KASSI  Positive BRCA2 mutation  Facial rash s/t Opdivo - mild    PLAN:  Continue Nivolumab 480 mg Q 4 weeks.   Continue conservative management of drug induced rash.  Consider compounded topical cream if symptoms  progress.  Return to clinic in 4 weeks for follow-up with restaging CT of the abdomen and pelvis.  All questions answered to the satisfaction of the patient.    VICENTE BARCLAY, FNP-C    Other Physicians  Dr. Arthur Patel ()  Dr. Richard Christopher ([facility])

## 2022-04-30 NOTE — PROGRESS NOTES
Patient:   Maci Lewis            MRN: [id number]            FIN: [id number]               Age:   63 years     Sex:  Female     :  1955   Associated Diagnoses:   None   Author:   Heriberto Schneider MD      Visit Information   Diagnosis: Recurrent multifocal hepatocellular carcinoma                    Stage IIA right breast cancer 10/13 (T2 N0 M0), 2.4 cm, grade III, ER/NE neg, Her-2 neg                    Positive BRCA2 mutation        S/p bilateral mastectomies and BSO     Current Treatment: Nivolumab Q 4 weeks (started 10/25/18)  Treatment History: AC x4 () -->weekly Taxol x12 () ---> XRT x 14 days ---> bilateral mastectomies; Partial right hepatectomy ; right hepatic segmentectomy ; Y-90 RHA 18; Y-90 LHA 18; Nexavar -18    Clinical History: Patient had a normal annual mammogram . She noted a nontender lump in the upper inner quadrant of the left breast on self-examination. Mammogram and ultrasound were suspicious for malignancy. Biopsy was positive for infiltrating ductal carcinoma. She underwent lumpectomy and sentinel lymph node dissection 10/28/13. Final pathology showed a 2.5 cm grade 3 infiltrating ductal carcinoma with clear margins, no lymphovascular invasion. 6 resected lymph nodes were negative for involvement. Estrogen and progesterone receptors were negative and HER-2 was negative for overexpression by FISH. There was no family history of breast cancer.  Adjuvant treatment was recommended with a regimen of Adriamycin/Cytoxan (AC) for 4 cycles every 3 weeks followed by weekly Taxol for 12 weeks and postlumpectomy radiation.  Baseline MUGA scan 13 showed normal wall motion with an LVEF of 76%.  She underwent placement of a Mediport and chemotherapy was initiated 13.  She completed 4 cycles of adjuvant AC chemotherapy with Neulasta support 14.  She did not require dose reduction or treatment delay.  Weekly Taxol was initiated  3/21/14. Taxol was dose reduced by 20% beginning with cycle #5 for development of mucositis and rash to bilateral hands. She completed her adjuvant chemotherapy 6/6/14.    She initiated adjuvant radiation therapy to the right breast. She subsequently underwent genetic counseling and testing. She tested positive for a BRCA2 gene mutation. Following a lengthy discussion, she made a decision to undergo bilateral mastectomies and further radiation was held. She completed 14 fractions of treatment.  She underwent bilateral mastectomies and ROCIO reconstruction 12/8/14. Breast pathology from the surgery was unremarkable.  A screening abdominal ultrasound 10/7/14 showed an unremarkable pancreas, liver and bile ducts.  She underwent prophylactic bilateral salpingo-oophorectomy 6/11/15.    She presented to the hospital 7/24/16 with acute right neck and shoulder pain, followed by abdominal pain and distention.  On presentation to the ER, she was hypotensive.  Initial H&H was 12.8 and 39.5 which decreased to 9.5 and 29.0 on repeat.  CT of the abdomen showed 2 complex heterogeneous masses along the inferior aspect of the right hepatic lobe.  The larger more peripheral mass showed evidence of active hemorrhage.  There was a large subcapsular hematoma with free fluid in the pelvis.  Attempted embolization by interventional radiology was unsuccessful.  She was taken to the OR for exploratory laparotomy.  A large intra-abdominal blood collection was evacuated.  There was tumor in hepatic segment 6 with necrosis and rupture with some minimal oozing but no active bleeding.  Partial right hepatectomy was performed.  Intraoperative ultrasound of the liver showed no other suspicious lesions.  Alpha-fetoprotein level was normal.  Final pathology showed a 5.3 cm intermediate grade hepatocellular carcinoma with focal hemorrhagic infarction, fibrosis and hepatic capsular rupture.  Margins of resection were clear.  following surgical  recovery, she was discharged to home 7/29/16 on oral iron supplementation. Follow-up CT scan of the abdomen and pelvis 10/14/16 showed postoperative changes in the lateral inferior margin of the right hepatic lobe and a small fluid collection. There was no abnormal contrast enhancement or evidence of suspicious liver lesions. Repeat scans 2/14/17 showed stable postoperative changes with improved small perihepatic fluid collection.    CT of the abdomen and pelvis 7/24/17 showed postsurgical changes along the inferior right hepatic lobe with a new area of hypoattenuation inferiorly along the suture line measuring up to about 12 mm. This finding was not associated with contrast enhancement to suggest recurrence. Short-term interval follow-up was recommended in 3 months by Radiology. There were small stable hepatic cysts. There were no other findings suspicious for disease recurrence. Follow-up CT scan 10/25/17 showed significant increase of the hypodense lesion adjacent to the suture line in am. Aspect of the right hepatic lobe to 4.7 x 4.2 cm. There was minimal low-density change within the lesion suggesting central necrosis and subtle contrast enhancement. Alpha-fetoprotein level was normal. CT scan of the chest 11/3/17 showed no evidence of metastatic disease. There was a small hypodense nodule in the right thyroid. She was referred to surgical oncology and underwent resection of segment 6 of the liver 11/24/17 along with cholecystectomy. Final pathology showed a moderately differentiated hepatocellular carcinoma measuring 6.7 cm in greatest dimension. There is no evidence of vascular or perineural invasion. All resection margins were clear. Surveillance CT scans of the chest, abdomen and pelvis to/22/18 showed postoperative changes of the right hepatic lobe with no findings suspicious for disease recurrence. There was a stable, minimal right upper lobe subpleural nodule unchanged from her previous exams. Films were  reviewed in conjunction with Surgical Oncology.    Surveillance CT scan of the abdomen and pelvis 5/25/18 shows significant progression and recurrence of her disease with a dominant mass in segment 5 of the right hepatic lobe measuring 6.7 cm.  There was a new mass in the right lobe measuring 3.7 cm in segment 7 and also in segment 8 measuring 1.6 cm.  A new lesion in the left lobe of the liver measured 4.2 cm.  Alpha-fetoprotein level was normal at 4.5 ng/mL.  She was referred for a CT-guided biopsy of the liver 6/5/18.  Pathology confirmed hepatocellular carcinoma, well-differentiated.    Her case was discussed with Surgical Oncology locally.  She was referred on to interventional radiology for consideration of Y-90 radioembolization.  She successfully underwent Y-90 radioembolization to the right hepatic artery to a total dose of 335 mGy 6/19/18.  She was evaluated at [facility] 6/20/18 by Dr. Gigi Perkins for consideration of liver transplant.  Based on her disease burden, she did not meet criteria for liver transplantation.  Recommendations were to attempt to downstage her tumor burden through radioembolization and potentially revisit transplantation in the future depending on her response.  Her case was presented at interdisciplinary conference, and these recommendations were supported, along with addition of Nexavar for systemic treatment as tolerated.  Nexavar was initiated 8/2/18.     She underwent Y-90 radioembolization of the left hepatic artery 8/14/18 to a total dose of 256 mGy.  She developed progressive fatigue, body aches, and fever up to 101 with no evidence of infection.  She took Naprosyn with minimal improvement.  She developed an erythematous rash involving her face and pruritis of the scalp felt secondary Nexavar.  She also developed significant alopecia. Laboratory showed worsening of her transaminitis and anemia following the embolization. Nexavar treatment had to be discontinued secondary to  severe toxicities. Follow-up CT scan of the abdomen and pelvis 10/1/18 showed slight interval decrease of the dominant right hepatic mass measuring 5.8 x 5.4 cm (previous 6.7 x 6.0 cm). However, there was disease progression elsewhere. Dominant left hepatic lobe mass measured 10.1 x 6.9 cm (previous 4.3 x 3.3 cm) and a second mass in segment 4 measured 7.6 x 7.3 cm (previous 4.1 x 3.0 cm). Portal vein was patent. There was no abnormal adenopathy, fluid collections or evidence of metastatic disease. Additional treatment options were discussed includin) resuming Nexavar at a dose reduction, 2) second line therapy with Levatinib or Stivarga or 3) immunotherapy with Nivolumab. She was referred to the phase I program at [facility] to evaluate for potential clinical trial eligibility.    She was seen at [facility] 10/18/18 by Dr. Christopher for a second treatment opinion. She was not eligible for any current clinical trials at their institution. Her pathology was requested and submitted for next generation sequencing to determine if she had any targetable mutations. We discussed further treatment and both Dr. Christopher and I agreed that her best option for further therapy would be treatment with Nivolumab given the uncharacteristic nature and behavior of her multifocal hepatocellular carcinoma area did benefits, risks and toxicities were reviewed and discussed with the patient and her  and treatment was initiated 10/25/18.    Restaging CT of the abdomen 1/15/19 after 3 months of therapy showed significant disease regression.  The reference left hepatic lobe mass measured 4.8 x 2.0 cm (previously 10.1 x 6.9 cm), mass in segment 4 was 4.3 x 2.0 cm (previous 7.6 x 7.3 cm) and a posterior right hepatic lobe mass was 2.7 x 2.1 cm (previous 5.2 x 4.3 cm). Her transaminases completely normalized and her anemia resolved.      Chief Complaint   I'm doing well      Interval History   She returns to clinic today by  herself for a four-week follow-up visit. She remains on treatment with Nivolumab every 4 weeks. She complains of dry eyes which was a pre-existing symptom. She is using eye drops during the day and gel at night recommended by her ophthalmologist. Otherwise, she has no significant complaints. She has no evidence of autoimmune mediated toxicities. She has no abdominal symptoms or complaints. She denies any skin rashes. Follow-up CT scans of the chest, abdomen and pelvis 4/4/19 showed continued decrease in size of the multiple hepatic lesions. Representative left hepatic lobe mass measured 3.6 cm (previous 4.8 cm). There were no new lesions. Spleen was unremarkable. There was a 2 mm noncalcified nodule in the left upper lobe. There was no suspicious mediastinal or hilar adenopathy.      Review of Systems   Constitutional:  No fever, No chills, No weakness, No fatigue, No decreased activity.    Eye:  No blurring, No double vision, No visual disturbances.    Ear/Nose/Mouth/Throat:  No nasal congestion, No sore throat.    Respiratory:  No shortness of breath, No cough, No sputum production, No hemoptysis, No wheezing.    Cardiovascular:  No chest pain, No palpitations, No tachycardia.    Breast:  Bilateral mastectomies with ROCIO reconstruction.  No changes on self-examination.    Gastrointestinal:  No nausea, No vomiting, No diarrhea, No constipation, No heartburn, No abdominal pain.    Genitourinary:  No dysuria, No hematuria, No change in urine stream.    Hematology/Lymphatics:  No bruising tendency, No bleeding tendency, No swollen lymph glands.    Musculoskeletal:  No lower extremity edema, No neck pain, No joint pain.    Integumentary:  No rash, No pruritus, No skin lesion.    Neurologic:  Alert and oriented X4, No abnormal balance, No confusion, No numbness, No tingling, No headache.    Psychiatric:  No anxiety, No depression.       Health Status   Current medications:  (Selected)   Documented  Medications  Documented  Caltrate 600 mg oral tablet: 600 mg = 1 tab(s), Oral, Daily, 0 Refill(s)  Multivitamins and Minerals oral tablet: 1 tab(s), Oral, Daily, 0 Refill(s)  Zantac 150 oral tablet: 150 mg = 1 tab(s), Oral, Once a day (at bedtime), # 30 tab(s), 0 Refill(s)  biotin 5000 mcg oral tablet, disintegratin,000 mcg = 1 tab(s), Oral, Daily, with meals  clobetasol 0.05% topical cream: TOP, BID  omega-3 polyunsaturated fatty acids (Fish Oil 1000mg Cap): 2 cap(s), Oral, BID, 0 Refill(s)      Histories   Past Medical History:    Resolved  Arthritis ([id number]):  Resolved.  Breast cancer ([id number]):  Resolved.  Hepatocellular carcinoma ([id number]):  Resolved.   Family History:    Kidney cancer, primary, with metastasis from kidney to other site....  Father  , Paternal uncle and maternal grandfather also had kidney cancer   Procedure history:    Exploration Laparotomy (Right) on 2017 at 61 Years.  Comments:  2017 12:24 - Mile Ortega RN  auto-populated from documented surgical case  Hepatic Resection on 2017 at 61 Years.  Comments:  2017 12:24 - Mile Ortega RN  auto-populated from documented surgical case  Cholecystectomy (.) on 2017 at 61 Years.  Comments:  2017 12:31 - Mile Ortega RN  auto-populated from documented surgical case  Hepatic Resection (None) on 2016 at 60 Years.  Comments:  2016 13:05 - Antonieta MURPHY, Carina BEARD  auto-populated from documented surgical case  Right breast lumpectomy.  Tonsillectomy.  Left foot bone spur removal.  Jose mastectomy with flap reconstruction.  Partial hysterectomy.  breast reconstruction revision / fat graph 2015.  Y90 - Yttrium 90 ([id number]).  Liposuction of abdomen ([id number]).      Gynecologic history   Menstrual cycle: Menarche age 11, first pregnancy at 30, + breast fed, menopause 46, HRT x 2 yrs.   Social History        Social & Psychosocial Habits    Alcohol  2018  Use: Past     Type: Beer, Liquor, Wine    Frequency: 1-2 times per week    Employment/School  12/12/2013  Status: Employed    Description:     Home/Environment  12/12/2013  Lives with: Spouse    Nutrition/Health  10/05/2015  Type of diet: Regular    Substance Abuse  05/21/2015 Risk Assessment: Denies Substance Abuse    10/05/2015  Use: Never    Tobacco  12/12/2013 Risk Assessment: Denies Tobacco Use    02/14/2019  Use: Never (less than 100 in l    Patient Wants Consult For Cessation Counseling N/A.        Physical Examination   Vital Signs   4/10/2019 12:55 CDT      Temperature Oral          37.1 DegC                             Temperature Oral (calculated)             98.78 DegF                             Peripheral Pulse Rate     77 bpm                             Systolic Blood Pressure   109 mmHg                             Diastolic Blood Pressure  78 mmHg     General:  Alert and oriented, Well-developed white female in no acute distress.    Eye:  Pupils are equal, round and reactive to light, Extraocular movements are intact, Normal conjunctiva, Sclera nonicteric.    HENT:  Normocephalic (+ Patchy alopecia), Oral mucosa is moist, No pharyngeal erythema.    Neck:  Supple, Non-tender, No lymphadenopathy.    Respiratory:  Lungs are clear to auscultation, Respirations are non-labored, Breath sounds are equal.    Cardiovascular:  Normal rate, Regular rhythm, No murmur.    Breast:  Not examined today.    Gastrointestinal:  Soft, Non-tender, Non-distended, Normal bowel sounds, Well-healed RUQ incision.  No palpable masses or organomegaly.    Lymphatics:  No lymphadenopathy neck, axilla, groin.    Musculoskeletal:  Normal range of motion, No tenderness, No lower extremity edema.    Integumentary:  Warm, Dry, No rash.    Neurologic:  Alert, Oriented, Normal sensory, Normal motor function, No focal deficits.    ECOG Performance Scale: 0 - Fully active; no performance restrictions.      Review / Management   Laboratory  Results   Today's Lab Results : PowerNote Discrete Results   4/10/2019 12:54 CDT      POC TCO2                  26.0 mmol/L                             POC Hb                    15.3 mg/dL                             POC Hct                   45.0 %                             POC Sodium                142 mmol/L                             POC Potassium             3.8 mmol/L                             POC Chloride              103 mmol/L                             POC Ion Calcium           1.18 mmol/L                             POC Glucose               119 mg/dL  HI                             POC BUN                   20.0 mg/dL                             POC Creatinine            0.8 mg/dL                             POC AGAP                  17.0  NA    4/10/2019 12:40 CDT      WBC                       4.8 x10(3)/mcL                             RBC                       4.86 x10(6)/mcL                             Hgb                       14.9 gm/dL                             Hct                       45.4 %                             Platelet                  160 x10(3)/mcL                             MCV                       93.4 fL                             MCH                       30.7 pg                             MCHC                      32.8 gm/dL  LOW                             RDW                       13.1 %                             MPV                       8.8 fL  LOW                             Abs Neut                  3.62 x10(3)/mcL                             NEUT%                     76.1 %  NA                             NEUT#                     3.6 x10(3)/mcL                             LYMPH%                    12.6 %  NA                             LYMPH#                    0.6 x10(3)/mcL                             MONO%                     8.4 %  NA                             MONO#                     0.4 x10(3)/mcL                             EOS%                       2.5 %  NA                             EOS#                      0.1 x10(3)/mcL                             BASO%                     0.4 %  NA                             BASO#                     0.0 x10(3)/mcL           Impression and Plan   IMPRESSION:  Recurrent multifocal hepatocellular carcinoma   Stage IIA triple negative breast cancer 10/13 - KASSI  Positive BRCA2 mutation    PLAN:  CT scans show continued response to treatment with further decrease in size of the multiple hepatic lesions.  The small left upper lobe pulmonary nodule is nonspecific and will be reevaluated on follow-up imaging.  Continue Nivolumab 480 mg every 4 weeks.  RTC in 4 weeks for follow-up visit, clinical exam and repeat laboratory.  Repeat CT scans of the chest, abdomen and pelvis in 3 months.      PHAM WOLF M.D.    Other Physicians  Dr. Arthur Patel ()  Dr. Richard Christopher ([facility])

## 2022-05-02 RX ORDER — HEPARIN 100 UNIT/ML
500 SYRINGE INTRAVENOUS
Status: CANCELLED | OUTPATIENT
Start: 2022-05-05

## 2022-05-02 RX ORDER — SODIUM CHLORIDE 0.9 % (FLUSH) 0.9 %
10 SYRINGE (ML) INJECTION
Status: CANCELLED | OUTPATIENT
Start: 2022-05-05

## 2022-05-05 ENCOUNTER — INFUSION (OUTPATIENT)
Dept: INFUSION THERAPY | Facility: HOSPITAL | Age: 67
End: 2022-05-05
Attending: OTOLARYNGOLOGY
Payer: MEDICARE

## 2022-05-05 VITALS
HEART RATE: 66 BPM | BODY MASS INDEX: 24.27 KG/M2 | WEIGHT: 151 LBS | OXYGEN SATURATION: 100 % | SYSTOLIC BLOOD PRESSURE: 137 MMHG | DIASTOLIC BLOOD PRESSURE: 87 MMHG | HEIGHT: 66 IN

## 2022-05-05 DIAGNOSIS — C22.0 HCC (HEPATOCELLULAR CARCINOMA): Primary | ICD-10-CM

## 2022-05-05 PROCEDURE — 25000003 PHARM REV CODE 250: Performed by: INTERNAL MEDICINE

## 2022-05-05 PROCEDURE — 63600175 PHARM REV CODE 636 W HCPCS: Mod: JG | Performed by: INTERNAL MEDICINE

## 2022-05-05 PROCEDURE — 96413 CHEMO IV INFUSION 1 HR: CPT

## 2022-05-05 RX ADMIN — DEXTROSE 480 MG: 50 INJECTION, SOLUTION INTRAVENOUS at 02:05

## 2022-05-10 DIAGNOSIS — C22.0 LIVER CELL CARCINOMA: Primary | ICD-10-CM

## 2022-05-13 DIAGNOSIS — C22.0: Primary | ICD-10-CM

## 2022-05-28 RX ORDER — SODIUM CHLORIDE 0.9 % (FLUSH) 0.9 %
10 SYRINGE (ML) INJECTION
Status: CANCELLED | OUTPATIENT
Start: 2022-06-02

## 2022-05-28 RX ORDER — HEPARIN 100 UNIT/ML
500 SYRINGE INTRAVENOUS
Status: CANCELLED | OUTPATIENT
Start: 2022-06-02

## 2022-05-30 DIAGNOSIS — C22.0: ICD-10-CM

## 2022-05-30 DIAGNOSIS — R53.1 WEAKNESS: ICD-10-CM

## 2022-05-30 DIAGNOSIS — Z17.0 MALIGNANT NEOPLASM OF CENTRAL PORTION OF RIGHT BREAST IN FEMALE, ESTROGEN RECEPTOR POSITIVE: Primary | ICD-10-CM

## 2022-05-30 DIAGNOSIS — C50.111 MALIGNANT NEOPLASM OF CENTRAL PORTION OF RIGHT BREAST IN FEMALE, ESTROGEN RECEPTOR POSITIVE: Primary | ICD-10-CM

## 2022-06-02 ENCOUNTER — INFUSION (OUTPATIENT)
Dept: INFUSION THERAPY | Facility: HOSPITAL | Age: 67
End: 2022-06-02
Attending: INTERNAL MEDICINE
Payer: MEDICARE

## 2022-06-02 VITALS
RESPIRATION RATE: 16 BRPM | TEMPERATURE: 100 F | OXYGEN SATURATION: 97 % | DIASTOLIC BLOOD PRESSURE: 84 MMHG | HEART RATE: 71 BPM | SYSTOLIC BLOOD PRESSURE: 141 MMHG

## 2022-06-02 DIAGNOSIS — C22.0: ICD-10-CM

## 2022-06-02 DIAGNOSIS — C22.0 HCC (HEPATOCELLULAR CARCINOMA): ICD-10-CM

## 2022-06-02 DIAGNOSIS — R53.1 WEAKNESS: ICD-10-CM

## 2022-06-02 DIAGNOSIS — Z17.0 MALIGNANT NEOPLASM OF CENTRAL PORTION OF RIGHT BREAST IN FEMALE, ESTROGEN RECEPTOR POSITIVE: Primary | ICD-10-CM

## 2022-06-02 DIAGNOSIS — C50.111 MALIGNANT NEOPLASM OF CENTRAL PORTION OF RIGHT BREAST IN FEMALE, ESTROGEN RECEPTOR POSITIVE: Primary | ICD-10-CM

## 2022-06-02 LAB
ALBUMIN SERPL-MCNC: 4 GM/DL (ref 3.4–4.8)
ALBUMIN/GLOB SERPL: 1.2 RATIO (ref 1.1–2)
ALP SERPL-CCNC: 66 UNIT/L (ref 40–150)
ALT SERPL-CCNC: 19 UNIT/L (ref 0–55)
AST SERPL-CCNC: 19 UNIT/L (ref 5–34)
BASOPHILS # BLD AUTO: 0.03 X10(3)/MCL (ref 0–0.2)
BASOPHILS NFR BLD AUTO: 0.7 %
BILIRUBIN DIRECT+TOT PNL SERPL-MCNC: 0.7 MG/DL
BUN SERPL-MCNC: 18 MG/DL (ref 9.8–20.1)
CALCIUM SERPL-MCNC: 9.3 MG/DL (ref 8.4–10.2)
CHLORIDE SERPL-SCNC: 107 MMOL/L (ref 98–107)
CO2 SERPL-SCNC: 24 MMOL/L (ref 23–31)
CREAT SERPL-MCNC: 0.81 MG/DL (ref 0.55–1.02)
EOSINOPHIL # BLD AUTO: 0.24 X10(3)/MCL (ref 0–0.9)
EOSINOPHIL NFR BLD AUTO: 5.3 %
ERYTHROCYTE [DISTWIDTH] IN BLOOD BY AUTOMATED COUNT: 12.4 % (ref 11.5–17)
GLOBULIN SER-MCNC: 3.3 GM/DL (ref 2.4–3.5)
GLUCOSE SERPL-MCNC: 118 MG/DL (ref 82–115)
HCT VFR BLD AUTO: 42 % (ref 37–47)
HGB BLD-MCNC: 14 GM/DL (ref 12–16)
IMM GRANULOCYTES # BLD AUTO: 0 X10(3)/MCL (ref 0–0.02)
IMM GRANULOCYTES NFR BLD AUTO: 0 % (ref 0–0.43)
LYMPHOCYTES # BLD AUTO: 1.23 X10(3)/MCL (ref 0.6–4.6)
LYMPHOCYTES NFR BLD AUTO: 27 %
MCH RBC QN AUTO: 30.5 PG (ref 27–31)
MCHC RBC AUTO-ENTMCNC: 33.3 MG/DL (ref 33–36)
MCV RBC AUTO: 91.5 FL (ref 80–94)
MONOCYTES # BLD AUTO: 0.33 X10(3)/MCL (ref 0.1–1.3)
MONOCYTES NFR BLD AUTO: 7.2 %
NEUTROPHILS # BLD AUTO: 2.7 X10(3)/MCL (ref 2.1–9.2)
NEUTROPHILS NFR BLD AUTO: 59.8 %
PLATELET # BLD AUTO: 165 X10(3)/MCL (ref 130–400)
PMV BLD AUTO: 8.8 FL (ref 9.4–12.4)
POTASSIUM SERPL-SCNC: 4.1 MMOL/L (ref 3.5–5.1)
PROT SERPL-MCNC: 7.3 GM/DL (ref 5.8–7.6)
RBC # BLD AUTO: 4.59 X10(6)/MCL (ref 4.2–5.4)
SODIUM SERPL-SCNC: 139 MMOL/L (ref 136–145)
TSH SERPL-ACNC: 1.53 UIU/ML (ref 0.35–4.94)
WBC # SPEC AUTO: 4.6 X10(3)/MCL (ref 4.5–11.5)

## 2022-06-02 PROCEDURE — 84443 ASSAY THYROID STIM HORMONE: CPT

## 2022-06-02 PROCEDURE — 63600175 PHARM REV CODE 636 W HCPCS: Mod: JG | Performed by: INTERNAL MEDICINE

## 2022-06-02 PROCEDURE — 85025 COMPLETE CBC W/AUTO DIFF WBC: CPT

## 2022-06-02 PROCEDURE — 96413 CHEMO IV INFUSION 1 HR: CPT

## 2022-06-02 PROCEDURE — 80053 COMPREHEN METABOLIC PANEL: CPT

## 2022-06-02 PROCEDURE — 25000003 PHARM REV CODE 250: Performed by: INTERNAL MEDICINE

## 2022-06-02 PROCEDURE — 36415 COLL VENOUS BLD VENIPUNCTURE: CPT

## 2022-06-02 RX ORDER — HEPARIN 100 UNIT/ML
500 SYRINGE INTRAVENOUS
Status: DISCONTINUED | OUTPATIENT
Start: 2022-06-02 | End: 2022-06-02 | Stop reason: HOSPADM

## 2022-06-02 RX ORDER — SODIUM CHLORIDE 0.9 % (FLUSH) 0.9 %
10 SYRINGE (ML) INJECTION
Status: DISCONTINUED | OUTPATIENT
Start: 2022-06-02 | End: 2022-06-02 | Stop reason: HOSPADM

## 2022-06-02 RX ADMIN — SODIUM CHLORIDE 480 MG: 9 INJECTION, SOLUTION INTRAVENOUS at 02:06

## 2022-06-13 ENCOUNTER — PATIENT MESSAGE (OUTPATIENT)
Dept: HEMATOLOGY/ONCOLOGY | Facility: CLINIC | Age: 67
End: 2022-06-13
Payer: MEDICARE

## 2022-06-27 ENCOUNTER — TELEPHONE (OUTPATIENT)
Dept: INTERNAL MEDICINE | Facility: CLINIC | Age: 67
End: 2022-06-27
Payer: MEDICARE

## 2022-06-27 DIAGNOSIS — M54.2 NECK PAIN: Primary | ICD-10-CM

## 2022-06-29 DIAGNOSIS — E06.4 DRUG-INDUCED THYROIDITIS: ICD-10-CM

## 2022-06-29 DIAGNOSIS — C50.111 MALIGNANT NEOPLASM OF CENTRAL PORTION OF RIGHT BREAST IN FEMALE, ESTROGEN RECEPTOR POSITIVE: Primary | ICD-10-CM

## 2022-06-29 DIAGNOSIS — Z17.0 MALIGNANT NEOPLASM OF CENTRAL PORTION OF RIGHT BREAST IN FEMALE, ESTROGEN RECEPTOR POSITIVE: Primary | ICD-10-CM

## 2022-06-29 RX ORDER — SODIUM CHLORIDE 0.9 % (FLUSH) 0.9 %
10 SYRINGE (ML) INJECTION
Status: CANCELLED | OUTPATIENT
Start: 2022-06-30

## 2022-06-29 RX ORDER — HEPARIN 100 UNIT/ML
500 SYRINGE INTRAVENOUS
Status: CANCELLED | OUTPATIENT
Start: 2022-06-30

## 2022-06-30 ENCOUNTER — INFUSION (OUTPATIENT)
Dept: INFUSION THERAPY | Facility: HOSPITAL | Age: 67
End: 2022-06-30
Attending: INTERNAL MEDICINE
Payer: MEDICARE

## 2022-06-30 ENCOUNTER — APPOINTMENT (OUTPATIENT)
Dept: LAB | Facility: HOSPITAL | Age: 67
End: 2022-06-30
Attending: INTERNAL MEDICINE
Payer: MEDICARE

## 2022-06-30 VITALS — DIASTOLIC BLOOD PRESSURE: 86 MMHG | HEART RATE: 65 BPM | SYSTOLIC BLOOD PRESSURE: 136 MMHG | TEMPERATURE: 98 F

## 2022-06-30 DIAGNOSIS — C22.0 HCC (HEPATOCELLULAR CARCINOMA): Primary | ICD-10-CM

## 2022-06-30 LAB
ALBUMIN SERPL-MCNC: 3.9 GM/DL (ref 3.4–4.8)
ALBUMIN/GLOB SERPL: 1.1 RATIO (ref 1.1–2)
ALP SERPL-CCNC: 71 UNIT/L (ref 40–150)
ALT SERPL-CCNC: 28 UNIT/L (ref 0–55)
AST SERPL-CCNC: 25 UNIT/L (ref 5–34)
BASOPHILS # BLD AUTO: 0.02 X10(3)/MCL (ref 0–0.2)
BASOPHILS NFR BLD AUTO: 0.4 %
BILIRUBIN DIRECT+TOT PNL SERPL-MCNC: 0.5 MG/DL
BUN SERPL-MCNC: 16.9 MG/DL (ref 9.8–20.1)
CALCIUM SERPL-MCNC: 9.6 MG/DL (ref 8.4–10.2)
CHLORIDE SERPL-SCNC: 105 MMOL/L (ref 98–107)
CO2 SERPL-SCNC: 29 MMOL/L (ref 23–31)
CREAT SERPL-MCNC: 0.79 MG/DL (ref 0.55–1.02)
EOSINOPHIL # BLD AUTO: 0.13 X10(3)/MCL (ref 0–0.9)
EOSINOPHIL NFR BLD AUTO: 2.7 %
ERYTHROCYTE [DISTWIDTH] IN BLOOD BY AUTOMATED COUNT: 12.4 % (ref 11.5–17)
GLOBULIN SER-MCNC: 3.4 GM/DL (ref 2.4–3.5)
GLUCOSE SERPL-MCNC: 110 MG/DL (ref 82–115)
HCT VFR BLD AUTO: 42.7 % (ref 37–47)
HGB BLD-MCNC: 14.2 GM/DL (ref 12–16)
IMM GRANULOCYTES # BLD AUTO: 0 X10(3)/MCL (ref 0–0.02)
IMM GRANULOCYTES NFR BLD AUTO: 0 % (ref 0–0.43)
LYMPHOCYTES # BLD AUTO: 1.28 X10(3)/MCL (ref 0.6–4.6)
LYMPHOCYTES NFR BLD AUTO: 27.1 %
MCH RBC QN AUTO: 30.5 PG (ref 27–31)
MCHC RBC AUTO-ENTMCNC: 33.3 MG/DL (ref 33–36)
MCV RBC AUTO: 91.6 FL (ref 80–94)
MONOCYTES # BLD AUTO: 0.48 X10(3)/MCL (ref 0.1–1.3)
MONOCYTES NFR BLD AUTO: 10.1 %
NEUTROPHILS # BLD AUTO: 2.8 X10(3)/MCL (ref 2.1–9.2)
NEUTROPHILS NFR BLD AUTO: 59.7 %
PLATELET # BLD AUTO: 184 X10(3)/MCL (ref 130–400)
PMV BLD AUTO: 9 FL (ref 7.4–10.4)
POTASSIUM SERPL-SCNC: 4.6 MMOL/L (ref 3.5–5.1)
PROT SERPL-MCNC: 7.3 GM/DL (ref 5.8–7.6)
RBC # BLD AUTO: 4.66 X10(6)/MCL (ref 4.2–5.4)
SODIUM SERPL-SCNC: 141 MMOL/L (ref 136–145)
TSH SERPL-ACNC: 1.9 UIU/ML (ref 0.35–4.94)
WBC # SPEC AUTO: 4.7 X10(3)/MCL (ref 4.5–11.5)

## 2022-06-30 PROCEDURE — 96413 CHEMO IV INFUSION 1 HR: CPT

## 2022-06-30 PROCEDURE — 36415 COLL VENOUS BLD VENIPUNCTURE: CPT | Performed by: INTERNAL MEDICINE

## 2022-06-30 PROCEDURE — 63600175 PHARM REV CODE 636 W HCPCS: Mod: JG | Performed by: INTERNAL MEDICINE

## 2022-06-30 PROCEDURE — 25000003 PHARM REV CODE 250: Performed by: INTERNAL MEDICINE

## 2022-06-30 RX ORDER — HEPARIN 100 UNIT/ML
500 SYRINGE INTRAVENOUS
Status: DISCONTINUED | OUTPATIENT
Start: 2022-06-30 | End: 2022-06-30 | Stop reason: HOSPADM

## 2022-06-30 RX ORDER — SODIUM CHLORIDE 0.9 % (FLUSH) 0.9 %
10 SYRINGE (ML) INJECTION
Status: DISCONTINUED | OUTPATIENT
Start: 2022-06-30 | End: 2022-06-30 | Stop reason: HOSPADM

## 2022-06-30 RX ADMIN — SODIUM CHLORIDE 480 MG: 9 INJECTION, SOLUTION INTRAVENOUS at 02:06

## 2022-07-10 NOTE — PROGRESS NOTES
Subjective:       Patient ID: Maci Lewis is a 66 y.o. female.    Chief Complaint:  No problems    HPI  Diagnosis: Recurrent multifocal hepatocellular carcinoma                    Stage IIA right breast cancer 10/13 (T2 N0 M0), 2.4 cm, grade III, ER/MS neg, Her-2 neg                    Positive BRCA2 mutation                    S/p bilateral mastectomies and BSO                     + COVID-19 vaccination (including booster)    Treatment History  AC x4 (2/14) -->weekly Taxol x12 (6//14) ---> XRT x 14 days ---> bilateral mastectomies  Partial right hepatectomy 7/16  Right hepatic segmentectomy 11/17  Y-90 RHA 6/19/18; Y-90 LHA 8/14/18  Nexavar 8/2-8/16/18    Current Treatment: Nivolumab Q 4 weeks (started 10/25/18)    Clinical History:  Patient underwent right breast lumpectomy and SLND 10/28/13. Final pathology showed a 2.5 cm grade 3 infiltrating ductal carcinoma with clear margins, no lymphovascular invasion and 6 negative LN's.  She completed adjuvant chemotherapy followed by radiation as outlined above.  Genetic testing was positive for a BRCA2 gene mutation.  She underwent bilateral mastectomies and ROCIO reconstruction 12/8/14    She presented to the hospital 7/24/16 with acute right neck and shoulder pain, followed by abdominal pain and distention.  She was anemic and hypotensive. CT of the abdomen showed 2 complex heterogeneous masses along the inferior aspect of the right hepatic lobe.  The larger more peripheral mass showed evidence of active hemorrhage.  There was a large subcapsular hematoma with free fluid in the pelvis.  Attempted embolization by interventional radiology was unsuccessful.  She was taken to the OR for exploratory laparotomy.  A large intra-abdominal blood collection was evacuated.  There was tumor in hepatic segment 6 with necrosis and rupture with some minimal oozing but no active bleeding.  Partial right hepatectomy was performed.  Intraoperative ultrasound of the liver showed  no other suspicious lesions.  Alpha-fetoprotein level was normal.  Final pathology showed a 5.3 cm intermediate grade hepatocellular carcinoma with focal hemorrhagic infarction, fibrosis and hepatic capsular rupture.  Margins of resection were clear. CT A/P 10/14/16 showed postoperative changes in the lateral inferior margin of the right hepatic lobe and a small fluid collection. There was no abnormal contrast enhancement or evidence of suspicious liver lesions. Repeat scans 2/14/17 showed stable postoperative changes with improved small perihepatic fluid collection.    CT of the abdomen and pelvis 7/24/17 showed postsurgical changes along the inferior right hepatic lobe with a new area of hypoattenuation inferiorly along the suture line measuring up to about 12 mm. This finding was not associated with contrast enhancement to suggest recurrence. Short-term interval follow-up was recommended in 3 months by Radiology. There were small stable hepatic cysts. Follow-up CT scan 10/25/17 showed significant increase of the hypodense lesion adjacent to the suture line in the right hepatic lobe to 4.7 x 4.2 cm. There was minimal low-density change within the lesion suggesting central necrosis and subtle contrast enhancement. Alpha-fetoprotein level was normal. CT scan of the chest 11/3/17 showed no evidence of metastatic disease. There was a small hypodense nodule in the right thyroid. She was referred to surgical oncology and underwent resection of segment 6 of the liver 11/24/17 along with cholecystectomy. Final pathology showed a moderately differentiated hepatocellular carcinoma measuring 6.7 cm in greatest dimension. There was no evidence of vascular or perineural invasion. All resection margins were clear.    Surveillance CT scan of the abdomen and pelvis 5/25/18 showed significant progression and recurrence of her disease with a dominant mass in segment 5 of the right hepatic lobe measuring 6.7 cm and a new mass in the  right lobe measuring 3.7 cm in segment 7 and also in segment 8 measuring 1.6 cm.  A new lesion in the left lobe of the liver measured 4.2 cm.  Alpha-fetoprotein level was normal at 4.5 ng/mL.  She was referred for a CT-guided biopsy of the liver 6/5/18.  Pathology confirmed hepatocellular carcinoma, well-differentiated.     She underwent Y-90 radioembolization to the right hepatic artery to a total dose of 335 mGy 6/19/18.  She was evaluated at [facility] 6/20/18 by Dr. Gigi Perkins for consideration of liver transplant.  Based on her disease burden, she did not meet criteria for liver transplantation.  Recommendations were to attempt to downstage her tumor burden through radioembolization and potentially revisit transplantation in the future depending on her response.  Her case was presented at interdisciplinary conference, and these recommendations were supported, along with addition of Nexavar for systemic treatment as tolerated.  Nexavar was initiated 8/2/18. She underwent Y-90 radioembolization of the left hepatic artery 8/14/18 to a total dose of 256 mGy.  Nexavar was discontinued secondary to significant toxicity including a severe rash, alopecia, anemia and transaminitis.    She was seen at [facility] 10/18/18 by Dr. Christopher for a second treatment opinion. She was not eligible for any current clinical trials at their institution. Her pathology was requested and submitted for next generation sequencing to determine if she had any targetable mutations. We discussed further treatment and both Dr. Christopher and I agreed that her best option for further therapy would be treatment with Nivolumab given the uncharacteristic nature and behavior of her multifocal hepatocellular carcinoma area did benefits, risks and toxicities were reviewed and discussed with the patient and her  and treatment was initiated 10/25/18.    Restaging CT of the abdomen 1/15/19 after 3 months of therapy showed significant disease regression.   The reference left hepatic lobe mass measured 4.8 x 2.0 cm (previously 10.1 x 6.9 cm), mass in segment IV was 4.3 x 2.0 cm (previous 7.6 x 7.3 cm) and a posterior right hepatic lobe mass was 2.7 x 2.1 cm (previous 5.2 x 4.3 cm). Her transaminases completely normalized and her anemia resolved. CT scans of the chest, abdomen and pelvis 4/4/19 showed continued decrease in size of the multiple hepatic lesions. Representative left hepatic lobe mass measured 3.6 cm (previous 4.8 cm). There were no new lesions. Spleen was unremarkable. There was a 2 mm noncalcified nodule in the left upper lobe. There was no suspicious mediastinal or hilar adenopathy. CT scans of the chest, abdomen and pelvis 4/2/19 showed continued interval decrease of the hepatic metastatic lesions and a 2 mm noncalcified nodule in the left upper lobe with no new sites of disease. Serial CT scans have shown no significant disease progression.     Interval History  She returns to the office today by herself for a 3 month follow-up visit.  She remains on treatment with nivolumab every 4 weeks.  She continues to tolerate treatment well.  She has had no evidence of immune mediated toxicities or side effects.  She has an excellent energy level and performance status.  No abdominal symptoms or complaints.  CT A/P 7/06/22 showed stable irregular calcification and post treatment changes in the anterior right and left hepatic lobes unchanged from 10/11/21.  There were no arterial enhancing lesions, retroperitoneal adenopathy or evidence of disease recurrence.  Laboratory testing remains entirely normal.    Review of Systems   Constitutional: Negative for appetite change, fatigue and fever.   HENT: Negative for mouth sores, sore throat and trouble swallowing.    Eyes: Negative.    Respiratory: Negative for cough, chest tightness and shortness of breath.    Cardiovascular: Negative for chest pain, palpitations and leg swelling.   Gastrointestinal: Negative for  "abdominal distention, abdominal pain, blood in stool, change in bowel habit, constipation, diarrhea, nausea, vomiting and change in bowel habit.   Genitourinary: Negative for dysuria, frequency and urgency.   Musculoskeletal: Positive for arthralgias (Mild). Negative for back pain.   Integumentary:  Negative for rash and mole/lesion.   Hematological: Negative for adenopathy. Does not bruise/bleed easily.     PMHx:  Arthritis, breast cancer  PSHx:  Tonsils, cholecystectomy, left foot surgery, hysterectomy/BSO, right breast lumpectomy, bilateral mastectomies with reconstruction, HCC resection x2, Y-90 embolization  SH:  Lifetime nonsmoker, occasional social alcohol use. Lives in [place] with her , retired .  FH:  Father, paternal uncle and maternal grandfather had kidney cancer. A great aunt had breast cancer.       Objective:        /80 (BP Location: Right arm, Patient Position: Sitting, BP Method: Large (Automatic))   Pulse 70   Temp 98.6 °F (37 °C)   Resp 18   Ht 5' 6" (1.676 m)   Wt 68.6 kg (151 lb 3.8 oz)   SpO2 98%   BMI 24.41 kg/m²    Physical Exam  Constitutional:       Appearance: Normal appearance.   HENT:      Head: Normocephalic.      Nose: Nose normal.      Mouth/Throat:      Mouth: Mucous membranes are moist.      Pharynx: Oropharynx is clear. No posterior oropharyngeal erythema.   Eyes:      Extraocular Movements: Extraocular movements intact.      Conjunctiva/sclera: Conjunctivae normal.      Pupils: Pupils are equal, round, and reactive to light.   Cardiovascular:      Rate and Rhythm: Normal rate and regular rhythm.      Heart sounds: No murmur heard.  Pulmonary:      Breath sounds: Normal breath sounds.   Chest:      Comments: Bilateral mastectomies with ROCIO reconstruction.  No palpable masses, skin changes or axillary nodes bilaterally.  Abdominal:      General: Abdomen is flat. Bowel sounds are normal. There is no distension.      Palpations: Abdomen is soft. "      Tenderness: There is no abdominal tenderness.      Comments: Well-healed RUQ incision.  No palpable masses or organomegaly.   Musculoskeletal:         General: No swelling or tenderness. Normal range of motion.      Cervical back: Neck supple. No tenderness.   Lymphadenopathy:      Cervical: No cervical adenopathy.   Skin:     General: Skin is warm and dry.      Findings: No rash.   Neurological:      General: No focal deficit present.      Mental Status: She is alert and oriented to person, place, and time.      Cranial Nerves: No cranial nerve deficit.      Gait: Gait normal.       ECOG SCORE    0 - Fully active-able to carry on all pre-disease performance without restriction          LABORATORY  Recent Results (from the past 168 hour(s))   CBC with Differential    Collection Time: 07/12/22  9:14 AM   Result Value Ref Range    WBC 3.6 (L) 4.5 - 11.5 x10(3)/mcL    RBC 4.58 4.20 - 5.40 x10(6)/mcL    Hgb 13.9 12.0 - 16.0 gm/dL    Hct 42.3 37.0 - 47.0 %    MCV 92.4 80.0 - 94.0 fL    MCH 30.3 27.0 - 31.0 pg    MCHC 32.9 (L) 33.0 - 36.0 mg/dL    RDW 12.4 11.5 - 17.0 %    Platelet 164 130 - 400 x10(3)/mcL    MPV 8.9 7.4 - 10.4 fL    Neut % 56.9 %    Lymph % 28.3 %    Mono % 10.0 %    Eos % 3.9 %    Basophil % 0.6 %    Lymph # 1.02 0.6 - 4.6 x10(3)/mcL    Neut # 2.1 2.1 - 9.2 x10(3)/mcL    Mono # 0.36 0.1 - 1.3 x10(3)/mcL    Eos # 0.14 0 - 0.9 x10(3)/mcL    Baso # 0.02 0 - 0.2 x10(3)/mcL    IG# 0.01 0 - 0.04 x10(3)/mcL    IG% 0.3 %            Assessment:   Recurrent multifocal hepatocellular carcinoma  Stage II A triple negative breast cancer 10/13 - KASSI  Positive BRCA2 mutation      Plan:   Patient continues to do well with no clinical or radiographic findings to indicate disease recurrence.  Continue treatment with nivolumab every 4 weeks, tolerating well.  RTC in 4 months for a follow-up visit and clinical exam.  Barring any problems, will repeat imaging studies in 9-12 months.      PHMA WOLF MD    Other  Physicians  Dr. Arthur Patel ()  Dr. Richard Perera

## 2022-07-11 RX ORDER — PRASTERONE 6.5 MG/1
1 INSERT VAGINAL DAILY
COMMUNITY
Start: 2022-05-19

## 2022-07-11 RX ORDER — PAROXETINE 10 MG/1
10 TABLET, FILM COATED ORAL DAILY
COMMUNITY
Start: 2022-07-05 | End: 2022-08-22

## 2022-07-12 ENCOUNTER — OFFICE VISIT (OUTPATIENT)
Dept: HEMATOLOGY/ONCOLOGY | Facility: CLINIC | Age: 67
End: 2022-07-12
Payer: MEDICARE

## 2022-07-12 ENCOUNTER — LAB VISIT (OUTPATIENT)
Dept: LAB | Facility: HOSPITAL | Age: 67
End: 2022-07-12
Payer: MEDICARE

## 2022-07-12 VITALS
WEIGHT: 151.25 LBS | TEMPERATURE: 99 F | OXYGEN SATURATION: 98 % | BODY MASS INDEX: 24.31 KG/M2 | HEART RATE: 70 BPM | HEIGHT: 66 IN | SYSTOLIC BLOOD PRESSURE: 111 MMHG | DIASTOLIC BLOOD PRESSURE: 80 MMHG | RESPIRATION RATE: 18 BRPM

## 2022-07-12 DIAGNOSIS — Z17.0 MALIGNANT NEOPLASM OF CENTRAL PORTION OF RIGHT BREAST IN FEMALE, ESTROGEN RECEPTOR POSITIVE: ICD-10-CM

## 2022-07-12 DIAGNOSIS — C50.111 MALIGNANT NEOPLASM OF CENTRAL PORTION OF RIGHT BREAST IN FEMALE, ESTROGEN RECEPTOR POSITIVE: ICD-10-CM

## 2022-07-12 DIAGNOSIS — E03.2 HYPOTHYROIDISM DUE TO DRUGS: ICD-10-CM

## 2022-07-12 DIAGNOSIS — E06.4 DRUG-INDUCED THYROIDITIS: ICD-10-CM

## 2022-07-12 DIAGNOSIS — C22.0: Primary | ICD-10-CM

## 2022-07-12 LAB
ALBUMIN SERPL-MCNC: 3.7 GM/DL (ref 3.4–4.8)
ALBUMIN/GLOB SERPL: 1.1 RATIO (ref 1.1–2)
ALP SERPL-CCNC: 65 UNIT/L (ref 40–150)
ALT SERPL-CCNC: 17 UNIT/L (ref 0–55)
AST SERPL-CCNC: 18 UNIT/L (ref 5–34)
BASOPHILS # BLD AUTO: 0.02 X10(3)/MCL (ref 0–0.2)
BASOPHILS NFR BLD AUTO: 0.6 %
BILIRUBIN DIRECT+TOT PNL SERPL-MCNC: 0.7 MG/DL
BUN SERPL-MCNC: 18.2 MG/DL (ref 9.8–20.1)
CALCIUM SERPL-MCNC: 9.4 MG/DL (ref 8.4–10.2)
CHLORIDE SERPL-SCNC: 106 MMOL/L (ref 98–107)
CO2 SERPL-SCNC: 24 MMOL/L (ref 23–31)
CREAT SERPL-MCNC: 0.84 MG/DL (ref 0.55–1.02)
EOSINOPHIL # BLD AUTO: 0.14 X10(3)/MCL (ref 0–0.9)
EOSINOPHIL NFR BLD AUTO: 3.9 %
ERYTHROCYTE [DISTWIDTH] IN BLOOD BY AUTOMATED COUNT: 12.4 % (ref 11.5–17)
GLOBULIN SER-MCNC: 3.4 GM/DL (ref 2.4–3.5)
GLUCOSE SERPL-MCNC: 96 MG/DL (ref 82–115)
HCT VFR BLD AUTO: 42.3 % (ref 37–47)
HGB BLD-MCNC: 13.9 GM/DL (ref 12–16)
IMM GRANULOCYTES # BLD AUTO: 0.01 X10(3)/MCL (ref 0–0.04)
IMM GRANULOCYTES NFR BLD AUTO: 0.3 %
LYMPHOCYTES # BLD AUTO: 1.02 X10(3)/MCL (ref 0.6–4.6)
LYMPHOCYTES NFR BLD AUTO: 28.3 %
MCH RBC QN AUTO: 30.3 PG (ref 27–31)
MCHC RBC AUTO-ENTMCNC: 32.9 MG/DL (ref 33–36)
MCV RBC AUTO: 92.4 FL (ref 80–94)
MONOCYTES # BLD AUTO: 0.36 X10(3)/MCL (ref 0.1–1.3)
MONOCYTES NFR BLD AUTO: 10 %
NEUTROPHILS # BLD AUTO: 2.1 X10(3)/MCL (ref 2.1–9.2)
NEUTROPHILS NFR BLD AUTO: 56.9 %
PLATELET # BLD AUTO: 164 X10(3)/MCL (ref 130–400)
PMV BLD AUTO: 8.9 FL (ref 7.4–10.4)
POTASSIUM SERPL-SCNC: 4.2 MMOL/L (ref 3.5–5.1)
PROT SERPL-MCNC: 7.1 GM/DL (ref 5.8–7.6)
RBC # BLD AUTO: 4.58 X10(6)/MCL (ref 4.2–5.4)
SODIUM SERPL-SCNC: 139 MMOL/L (ref 136–145)
TSH SERPL-ACNC: 2.18 UIU/ML (ref 0.35–4.94)
WBC # SPEC AUTO: 3.6 X10(3)/MCL (ref 4.5–11.5)

## 2022-07-12 PROCEDURE — 85025 COMPLETE CBC W/AUTO DIFF WBC: CPT

## 2022-07-12 PROCEDURE — 99214 OFFICE O/P EST MOD 30 MIN: CPT | Mod: S$PBB,,, | Performed by: INTERNAL MEDICINE

## 2022-07-12 PROCEDURE — 99999 PR PBB SHADOW E&M-EST. PATIENT-LVL III: CPT | Mod: PBBFAC,,, | Performed by: INTERNAL MEDICINE

## 2022-07-12 PROCEDURE — 99213 OFFICE O/P EST LOW 20 MIN: CPT | Mod: PBBFAC | Performed by: INTERNAL MEDICINE

## 2022-07-12 PROCEDURE — 80053 COMPREHEN METABOLIC PANEL: CPT

## 2022-07-12 PROCEDURE — 99214 PR OFFICE/OUTPT VISIT, EST, LEVL IV, 30-39 MIN: ICD-10-PCS | Mod: S$PBB,,, | Performed by: INTERNAL MEDICINE

## 2022-07-12 PROCEDURE — 36415 COLL VENOUS BLD VENIPUNCTURE: CPT

## 2022-07-12 PROCEDURE — 84443 ASSAY THYROID STIM HORMONE: CPT

## 2022-07-12 PROCEDURE — 99999 PR PBB SHADOW E&M-EST. PATIENT-LVL III: ICD-10-PCS | Mod: PBBFAC,,, | Performed by: INTERNAL MEDICINE

## 2022-07-12 RX ORDER — SODIUM CHLORIDE 0.9 % (FLUSH) 0.9 %
10 SYRINGE (ML) INJECTION
Status: CANCELLED | OUTPATIENT
Start: 2022-07-28

## 2022-07-12 RX ORDER — HEPARIN 100 UNIT/ML
500 SYRINGE INTRAVENOUS
Status: CANCELLED | OUTPATIENT
Start: 2022-07-28

## 2022-07-28 ENCOUNTER — LAB VISIT (OUTPATIENT)
Dept: LAB | Facility: HOSPITAL | Age: 67
End: 2022-07-28
Attending: INTERNAL MEDICINE
Payer: MEDICARE

## 2022-07-28 ENCOUNTER — INFUSION (OUTPATIENT)
Dept: INFUSION THERAPY | Facility: HOSPITAL | Age: 67
End: 2022-07-28
Attending: INTERNAL MEDICINE
Payer: MEDICARE

## 2022-07-28 VITALS
DIASTOLIC BLOOD PRESSURE: 86 MMHG | TEMPERATURE: 98 F | SYSTOLIC BLOOD PRESSURE: 133 MMHG | WEIGHT: 151 LBS | HEART RATE: 67 BPM | BODY MASS INDEX: 24.37 KG/M2 | RESPIRATION RATE: 16 BRPM

## 2022-07-28 DIAGNOSIS — C50.111 MALIGNANT NEOPLASM OF CENTRAL PORTION OF RIGHT BREAST IN FEMALE, ESTROGEN RECEPTOR POSITIVE: ICD-10-CM

## 2022-07-28 DIAGNOSIS — E06.4 DRUG-INDUCED THYROIDITIS: ICD-10-CM

## 2022-07-28 DIAGNOSIS — C22.0 HCC (HEPATOCELLULAR CARCINOMA): Primary | ICD-10-CM

## 2022-07-28 DIAGNOSIS — Z17.0 MALIGNANT NEOPLASM OF CENTRAL PORTION OF RIGHT BREAST IN FEMALE, ESTROGEN RECEPTOR POSITIVE: ICD-10-CM

## 2022-07-28 LAB
ALBUMIN SERPL-MCNC: 3.9 GM/DL (ref 3.4–4.8)
ALBUMIN/GLOB SERPL: 1.1 RATIO (ref 1.1–2)
ALP SERPL-CCNC: 60 UNIT/L (ref 40–150)
ALT SERPL-CCNC: 19 UNIT/L (ref 0–55)
AST SERPL-CCNC: 18 UNIT/L (ref 5–34)
BASOPHILS # BLD AUTO: 0.02 X10(3)/MCL (ref 0–0.2)
BASOPHILS NFR BLD AUTO: 0.4 %
BILIRUBIN DIRECT+TOT PNL SERPL-MCNC: 0.7 MG/DL
BUN SERPL-MCNC: 19.4 MG/DL (ref 9.8–20.1)
CALCIUM SERPL-MCNC: 9.5 MG/DL (ref 8.4–10.2)
CHLORIDE SERPL-SCNC: 106 MMOL/L (ref 98–107)
CO2 SERPL-SCNC: 27 MMOL/L (ref 23–31)
CREAT SERPL-MCNC: 0.86 MG/DL (ref 0.55–1.02)
EOSINOPHIL # BLD AUTO: 0.25 X10(3)/MCL (ref 0–0.9)
EOSINOPHIL NFR BLD AUTO: 5.5 %
ERYTHROCYTE [DISTWIDTH] IN BLOOD BY AUTOMATED COUNT: 12.5 % (ref 11.5–17)
GLOBULIN SER-MCNC: 3.6 GM/DL (ref 2.4–3.5)
GLUCOSE SERPL-MCNC: 104 MG/DL (ref 82–115)
HCT VFR BLD AUTO: 42.9 % (ref 37–47)
HGB BLD-MCNC: 14.4 GM/DL (ref 12–16)
IMM GRANULOCYTES # BLD AUTO: 0 X10(3)/MCL (ref 0–0.04)
IMM GRANULOCYTES NFR BLD AUTO: 0 %
LYMPHOCYTES # BLD AUTO: 1.23 X10(3)/MCL (ref 0.6–4.6)
LYMPHOCYTES NFR BLD AUTO: 27.2 %
MCH RBC QN AUTO: 30.9 PG (ref 27–31)
MCHC RBC AUTO-ENTMCNC: 33.6 MG/DL (ref 33–36)
MCV RBC AUTO: 92.1 FL (ref 80–94)
MONOCYTES # BLD AUTO: 0.45 X10(3)/MCL (ref 0.1–1.3)
MONOCYTES NFR BLD AUTO: 9.9 %
NEUTROPHILS # BLD AUTO: 2.6 X10(3)/MCL (ref 2.1–9.2)
NEUTROPHILS NFR BLD AUTO: 57 %
PLATELET # BLD AUTO: 167 X10(3)/MCL (ref 130–400)
PMV BLD AUTO: 9.1 FL (ref 7.4–10.4)
POTASSIUM SERPL-SCNC: 4.2 MMOL/L (ref 3.5–5.1)
PROT SERPL-MCNC: 7.5 GM/DL (ref 5.8–7.6)
RBC # BLD AUTO: 4.66 X10(6)/MCL (ref 4.2–5.4)
SODIUM SERPL-SCNC: 140 MMOL/L (ref 136–145)
TSH SERPL-ACNC: 1.42 UIU/ML (ref 0.35–4.94)
WBC # SPEC AUTO: 4.5 X10(3)/MCL (ref 4.5–11.5)

## 2022-07-28 PROCEDURE — 96413 CHEMO IV INFUSION 1 HR: CPT

## 2022-07-28 PROCEDURE — 63600175 PHARM REV CODE 636 W HCPCS: Mod: JG | Performed by: INTERNAL MEDICINE

## 2022-07-28 PROCEDURE — 36415 COLL VENOUS BLD VENIPUNCTURE: CPT

## 2022-07-28 PROCEDURE — 80053 COMPREHEN METABOLIC PANEL: CPT

## 2022-07-28 PROCEDURE — 25000003 PHARM REV CODE 250: Performed by: INTERNAL MEDICINE

## 2022-07-28 PROCEDURE — 85025 COMPLETE CBC W/AUTO DIFF WBC: CPT

## 2022-07-28 PROCEDURE — 84443 ASSAY THYROID STIM HORMONE: CPT

## 2022-07-28 RX ORDER — HEPARIN 100 UNIT/ML
500 SYRINGE INTRAVENOUS
Status: DISCONTINUED | OUTPATIENT
Start: 2022-07-28 | End: 2022-07-28 | Stop reason: HOSPADM

## 2022-07-28 RX ORDER — SODIUM CHLORIDE 0.9 % (FLUSH) 0.9 %
10 SYRINGE (ML) INJECTION
Status: DISCONTINUED | OUTPATIENT
Start: 2022-07-28 | End: 2022-07-28 | Stop reason: HOSPADM

## 2022-07-28 RX ADMIN — SODIUM CHLORIDE 480 MG: 9 INJECTION, SOLUTION INTRAVENOUS at 03:07

## 2022-07-28 NOTE — PLAN OF CARE
Monitor vital signs  Monitor labs   Re enforce teaching regarding chemo  Communicate  future visits

## 2022-08-20 RX ORDER — HEPARIN 100 UNIT/ML
500 SYRINGE INTRAVENOUS
Status: CANCELLED | OUTPATIENT
Start: 2022-08-25

## 2022-08-20 RX ORDER — SODIUM CHLORIDE 0.9 % (FLUSH) 0.9 %
10 SYRINGE (ML) INJECTION
Status: CANCELLED | OUTPATIENT
Start: 2022-08-25

## 2022-08-25 ENCOUNTER — APPOINTMENT (OUTPATIENT)
Dept: LAB | Facility: HOSPITAL | Age: 67
End: 2022-08-25
Attending: INTERNAL MEDICINE
Payer: MEDICARE

## 2022-08-25 ENCOUNTER — INFUSION (OUTPATIENT)
Dept: INFUSION THERAPY | Facility: HOSPITAL | Age: 67
End: 2022-08-25
Attending: INTERNAL MEDICINE
Payer: MEDICARE

## 2022-08-25 VITALS
BODY MASS INDEX: 24.24 KG/M2 | DIASTOLIC BLOOD PRESSURE: 86 MMHG | RESPIRATION RATE: 18 BRPM | TEMPERATURE: 98 F | OXYGEN SATURATION: 98 % | SYSTOLIC BLOOD PRESSURE: 138 MMHG | HEART RATE: 64 BPM | WEIGHT: 150.81 LBS | HEIGHT: 66 IN

## 2022-08-25 DIAGNOSIS — C22.0 HCC (HEPATOCELLULAR CARCINOMA): Primary | ICD-10-CM

## 2022-08-25 PROCEDURE — 36415 COLL VENOUS BLD VENIPUNCTURE: CPT | Performed by: INTERNAL MEDICINE

## 2022-08-25 PROCEDURE — 63600175 PHARM REV CODE 636 W HCPCS: Mod: JG | Performed by: INTERNAL MEDICINE

## 2022-08-25 PROCEDURE — 25000003 PHARM REV CODE 250: Performed by: INTERNAL MEDICINE

## 2022-08-25 PROCEDURE — 96413 CHEMO IV INFUSION 1 HR: CPT

## 2022-08-25 RX ORDER — HEPARIN 100 UNIT/ML
500 SYRINGE INTRAVENOUS
Status: DISCONTINUED | OUTPATIENT
Start: 2022-08-25 | End: 2022-08-25 | Stop reason: HOSPADM

## 2022-08-25 RX ORDER — SODIUM CHLORIDE 0.9 % (FLUSH) 0.9 %
10 SYRINGE (ML) INJECTION
Status: DISCONTINUED | OUTPATIENT
Start: 2022-08-25 | End: 2022-08-25 | Stop reason: HOSPADM

## 2022-08-25 RX ADMIN — SODIUM CHLORIDE 480 MG: 9 INJECTION, SOLUTION INTRAVENOUS at 03:08

## 2022-09-15 ENCOUNTER — HISTORICAL (OUTPATIENT)
Dept: ADMINISTRATIVE | Facility: HOSPITAL | Age: 67
End: 2022-09-15
Payer: MEDICARE

## 2022-09-17 RX ORDER — SODIUM CHLORIDE 0.9 % (FLUSH) 0.9 %
10 SYRINGE (ML) INJECTION
Status: CANCELLED | OUTPATIENT
Start: 2022-09-22

## 2022-09-17 RX ORDER — HEPARIN 100 UNIT/ML
500 SYRINGE INTRAVENOUS
Status: CANCELLED | OUTPATIENT
Start: 2022-09-22

## 2022-09-19 ENCOUNTER — TELEPHONE (OUTPATIENT)
Dept: INTERNAL MEDICINE | Facility: CLINIC | Age: 67
End: 2022-09-19
Payer: MEDICARE

## 2022-09-19 DIAGNOSIS — R19.7 DIARRHEA, UNSPECIFIED TYPE: Primary | ICD-10-CM

## 2022-09-19 PROCEDURE — 87045 FECES CULTURE AEROBIC BACT: CPT | Performed by: OTOLARYNGOLOGY

## 2022-09-19 PROCEDURE — 86318 IA INFECTIOUS AGENT ANTIBODY: CPT | Performed by: OTOLARYNGOLOGY

## 2022-09-20 LAB
CLOSTRIDIUM DIFFICILE GDH ANTIGEN (OHS): NEGATIVE
CLOSTRIDIUM DIFFICILE TOXIN A/B (OHS): NEGATIVE

## 2022-09-22 ENCOUNTER — TELEPHONE (OUTPATIENT)
Dept: INTERNAL MEDICINE | Facility: CLINIC | Age: 67
End: 2022-09-22
Payer: MEDICARE

## 2022-09-22 ENCOUNTER — INFUSION (OUTPATIENT)
Dept: INFUSION THERAPY | Facility: HOSPITAL | Age: 67
End: 2022-09-22
Attending: INTERNAL MEDICINE
Payer: MEDICARE

## 2022-09-22 ENCOUNTER — APPOINTMENT (OUTPATIENT)
Dept: LAB | Facility: HOSPITAL | Age: 67
End: 2022-09-22
Payer: MEDICARE

## 2022-09-22 VITALS
HEART RATE: 72 BPM | OXYGEN SATURATION: 99 % | RESPIRATION RATE: 16 BRPM | SYSTOLIC BLOOD PRESSURE: 110 MMHG | TEMPERATURE: 98 F | DIASTOLIC BLOOD PRESSURE: 78 MMHG

## 2022-09-22 DIAGNOSIS — C22.0 HCC (HEPATOCELLULAR CARCINOMA): Primary | ICD-10-CM

## 2022-09-22 LAB — BACTERIA STL CULT: NORMAL

## 2022-09-22 PROCEDURE — 63600175 PHARM REV CODE 636 W HCPCS: Mod: JG | Performed by: INTERNAL MEDICINE

## 2022-09-22 PROCEDURE — 25000003 PHARM REV CODE 250: Performed by: INTERNAL MEDICINE

## 2022-09-22 PROCEDURE — 96413 CHEMO IV INFUSION 1 HR: CPT

## 2022-09-22 PROCEDURE — 36415 COLL VENOUS BLD VENIPUNCTURE: CPT | Performed by: INTERNAL MEDICINE

## 2022-09-22 RX ORDER — HEPARIN 100 UNIT/ML
500 SYRINGE INTRAVENOUS
Status: DISCONTINUED | OUTPATIENT
Start: 2022-09-22 | End: 2022-09-22 | Stop reason: HOSPADM

## 2022-09-22 RX ORDER — SODIUM CHLORIDE 0.9 % (FLUSH) 0.9 %
10 SYRINGE (ML) INJECTION
Status: DISCONTINUED | OUTPATIENT
Start: 2022-09-22 | End: 2022-09-22 | Stop reason: HOSPADM

## 2022-09-22 RX ADMIN — SODIUM CHLORIDE 480 MG: 9 INJECTION, SOLUTION INTRAVENOUS at 02:09

## 2022-09-22 NOTE — TELEPHONE ENCOUNTER
Pt called stating she saw her stool culture results were negative on the portal but she still continues to have the diarrhea. She wants to know what the next step is. Please advise    CB# [phone]

## 2022-09-22 NOTE — TELEPHONE ENCOUNTER
Pt notified of recommendations, verbalized understanding. States she would like to see  Dr Burrell unless he wants to refer her elsewhere. Please advise

## 2022-09-23 ENCOUNTER — PATIENT MESSAGE (OUTPATIENT)
Dept: HEMATOLOGY/ONCOLOGY | Facility: CLINIC | Age: 67
End: 2022-09-23
Payer: MEDICARE

## 2022-10-05 ENCOUNTER — DOCUMENTATION ONLY (OUTPATIENT)
Dept: INTERNAL MEDICINE | Facility: CLINIC | Age: 67
End: 2022-10-05
Payer: MEDICARE

## 2022-10-05 LAB — CRC RECOMMENDATION EXT: NORMAL

## 2022-10-19 RX ORDER — SODIUM CHLORIDE 0.9 % (FLUSH) 0.9 %
10 SYRINGE (ML) INJECTION
Status: CANCELLED | OUTPATIENT
Start: 2022-10-20

## 2022-10-19 RX ORDER — HEPARIN 100 UNIT/ML
500 SYRINGE INTRAVENOUS
Status: CANCELLED | OUTPATIENT
Start: 2022-10-20

## 2022-10-24 ENCOUNTER — LAB VISIT (OUTPATIENT)
Dept: LAB | Facility: HOSPITAL | Age: 67
End: 2022-10-24
Attending: INTERNAL MEDICINE
Payer: MEDICARE

## 2022-10-24 ENCOUNTER — INFUSION (OUTPATIENT)
Dept: INFUSION THERAPY | Facility: HOSPITAL | Age: 67
End: 2022-10-24
Attending: INTERNAL MEDICINE
Payer: MEDICARE

## 2022-10-24 VITALS
SYSTOLIC BLOOD PRESSURE: 128 MMHG | HEART RATE: 80 BPM | OXYGEN SATURATION: 97 % | DIASTOLIC BLOOD PRESSURE: 88 MMHG | RESPIRATION RATE: 16 BRPM | TEMPERATURE: 99 F

## 2022-10-24 DIAGNOSIS — Z17.0 MALIGNANT NEOPLASM OF CENTRAL PORTION OF RIGHT BREAST IN FEMALE, ESTROGEN RECEPTOR POSITIVE: ICD-10-CM

## 2022-10-24 DIAGNOSIS — C22.0 HCC (HEPATOCELLULAR CARCINOMA): Primary | ICD-10-CM

## 2022-10-24 DIAGNOSIS — C50.111 MALIGNANT NEOPLASM OF CENTRAL PORTION OF RIGHT BREAST IN FEMALE, ESTROGEN RECEPTOR POSITIVE: ICD-10-CM

## 2022-10-24 DIAGNOSIS — C22.0: ICD-10-CM

## 2022-10-24 DIAGNOSIS — E03.2 HYPOTHYROIDISM DUE TO DRUGS: ICD-10-CM

## 2022-10-24 LAB
ALBUMIN SERPL-MCNC: 3.9 GM/DL (ref 3.4–4.8)
ALBUMIN/GLOB SERPL: 1.1 RATIO (ref 1.1–2)
ALP SERPL-CCNC: 61 UNIT/L (ref 40–150)
ALT SERPL-CCNC: 24 UNIT/L (ref 0–55)
AST SERPL-CCNC: 19 UNIT/L (ref 5–34)
BASOPHILS # BLD AUTO: 0.03 X10(3)/MCL (ref 0–0.2)
BASOPHILS NFR BLD AUTO: 0.6 %
BILIRUBIN DIRECT+TOT PNL SERPL-MCNC: 0.9 MG/DL
BUN SERPL-MCNC: 21.3 MG/DL (ref 9.8–20.1)
CALCIUM SERPL-MCNC: 9.6 MG/DL (ref 8.4–10.2)
CHLORIDE SERPL-SCNC: 105 MMOL/L (ref 98–107)
CO2 SERPL-SCNC: 26 MMOL/L (ref 23–31)
CREAT SERPL-MCNC: 1.04 MG/DL (ref 0.55–1.02)
EOSINOPHIL # BLD AUTO: 0.15 X10(3)/MCL (ref 0–0.9)
EOSINOPHIL NFR BLD AUTO: 3 %
ERYTHROCYTE [DISTWIDTH] IN BLOOD BY AUTOMATED COUNT: 12.2 % (ref 11.5–17)
GFR SERPLBLD CREATININE-BSD FMLA CKD-EPI: 59 MLS/MIN/1.73/M2
GLOBULIN SER-MCNC: 3.4 GM/DL (ref 2.4–3.5)
GLUCOSE SERPL-MCNC: 108 MG/DL (ref 82–115)
HCT VFR BLD AUTO: 42.7 % (ref 37–47)
HGB BLD-MCNC: 13.8 GM/DL (ref 12–16)
IMM GRANULOCYTES # BLD AUTO: 0 X10(3)/MCL (ref 0–0.04)
IMM GRANULOCYTES NFR BLD AUTO: 0 %
LYMPHOCYTES # BLD AUTO: 1.19 X10(3)/MCL (ref 0.6–4.6)
LYMPHOCYTES NFR BLD AUTO: 23.7 %
MCH RBC QN AUTO: 29.9 PG (ref 27–31)
MCHC RBC AUTO-ENTMCNC: 32.3 MG/DL (ref 33–36)
MCV RBC AUTO: 92.4 FL (ref 80–94)
MONOCYTES # BLD AUTO: 0.43 X10(3)/MCL (ref 0.1–1.3)
MONOCYTES NFR BLD AUTO: 8.6 %
NEUTROPHILS # BLD AUTO: 3.2 X10(3)/MCL (ref 2.1–9.2)
NEUTROPHILS NFR BLD AUTO: 64.1 %
PLATELET # BLD AUTO: 180 X10(3)/MCL (ref 130–400)
PMV BLD AUTO: 8.5 FL (ref 7.4–10.4)
POTASSIUM SERPL-SCNC: 4 MMOL/L (ref 3.5–5.1)
PROT SERPL-MCNC: 7.3 GM/DL (ref 5.8–7.6)
RBC # BLD AUTO: 4.62 X10(6)/MCL (ref 4.2–5.4)
SODIUM SERPL-SCNC: 141 MMOL/L (ref 136–145)
TSH SERPL-ACNC: 1.86 UIU/ML (ref 0.35–4.94)
WBC # SPEC AUTO: 5 X10(3)/MCL (ref 4.5–11.5)

## 2022-10-24 PROCEDURE — 96413 CHEMO IV INFUSION 1 HR: CPT

## 2022-10-24 PROCEDURE — 80053 COMPREHEN METABOLIC PANEL: CPT

## 2022-10-24 PROCEDURE — 25000003 PHARM REV CODE 250: Performed by: INTERNAL MEDICINE

## 2022-10-24 PROCEDURE — 84443 ASSAY THYROID STIM HORMONE: CPT

## 2022-10-24 PROCEDURE — 36415 COLL VENOUS BLD VENIPUNCTURE: CPT

## 2022-10-24 PROCEDURE — 85025 COMPLETE CBC W/AUTO DIFF WBC: CPT

## 2022-10-24 PROCEDURE — 63600175 PHARM REV CODE 636 W HCPCS: Mod: JG | Performed by: INTERNAL MEDICINE

## 2022-10-24 RX ORDER — SODIUM CHLORIDE 0.9 % (FLUSH) 0.9 %
10 SYRINGE (ML) INJECTION
Status: DISCONTINUED | OUTPATIENT
Start: 2022-10-24 | End: 2022-10-24 | Stop reason: HOSPADM

## 2022-10-24 RX ORDER — HEPARIN 100 UNIT/ML
500 SYRINGE INTRAVENOUS
Status: DISCONTINUED | OUTPATIENT
Start: 2022-10-24 | End: 2022-10-24 | Stop reason: HOSPADM

## 2022-10-24 RX ADMIN — SODIUM CHLORIDE 480 MG: 9 INJECTION, SOLUTION INTRAVENOUS at 02:10

## 2022-11-05 NOTE — PROGRESS NOTES
Subjective:       Patient ID: Maci Lewis is a 66 y.o. female.    Chief Complaint:  My diarrhea resolved when I stopped the Paxil    HPI  Diagnosis: Recurrent multifocal hepatocellular carcinoma                    Stage IIA right breast cancer 10/13 (T2 N0 M0), 2.4 cm, grade III, ER/CA neg, Her-2 neg                    Positive BRCA2 mutation                    S/p bilateral mastectomies and BSO                     + COVID-19 vaccinated    Treatment History  AC x4 (2/14) -->weekly Taxol x12 (6//14) ---> XRT x 14 days ---> bilateral mastectomies  Partial right hepatectomy 7/16  Right hepatic segmentectomy 11/17  Y-90 RHA 6/19/18; Y-90 LHA 8/14/18  Nexavar 8/2-8/16/18    Current Treatment: Nivolumab Q 4 weeks (started 10/25/18)    Clinical History:  Patient underwent right breast lumpectomy and SLND 10/28/13. Final pathology showed a 2.5 cm grade 3 infiltrating ductal carcinoma with clear margins, no lymphovascular invasion and 6 negative LN's.  She completed adjuvant chemotherapy followed by radiation as outlined above.  Genetic testing was positive for a BRCA2 gene mutation.  She underwent bilateral mastectomies and ROCIO reconstruction 12/8/14    She presented to the hospital 7/24/16 with acute right neck and shoulder pain, followed by abdominal pain and distention.  She was anemic and hypotensive. CT of the abdomen showed 2 complex heterogeneous masses along the inferior aspect of the right hepatic lobe.  The larger more peripheral mass showed evidence of active hemorrhage.  There was a large subcapsular hematoma with free fluid in the pelvis.  Attempted embolization by interventional radiology was unsuccessful.  She was taken to the OR for exploratory laparotomy.  A large intra-abdominal blood collection was evacuated.  There was tumor in hepatic segment 6 with necrosis and rupture with some minimal oozing but no active bleeding.  Partial right hepatectomy was performed.  Intraoperative ultrasound of the  liver showed no other suspicious lesions.  Alpha-fetoprotein level was normal.  Final pathology showed a 5.3 cm intermediate grade hepatocellular carcinoma with focal hemorrhagic infarction, fibrosis and hepatic capsular rupture.  Margins of resection were clear. CT A/P 10/14/16 showed postoperative changes in the lateral inferior margin of the right hepatic lobe and a small fluid collection. There was no abnormal contrast enhancement or evidence of suspicious liver lesions. Repeat scans 2/14/17 showed stable postoperative changes with improved small perihepatic fluid collection.    CT of the abdomen and pelvis 7/24/17 showed postsurgical changes along the inferior right hepatic lobe with a new area of hypoattenuation inferiorly along the suture line measuring up to about 12 mm. This finding was not associated with contrast enhancement to suggest recurrence. Short-term interval follow-up was recommended in 3 months by Radiology. There were small stable hepatic cysts. Follow-up CT scan 10/25/17 showed significant increase of the hypodense lesion adjacent to the suture line in the right hepatic lobe to 4.7 x 4.2 cm. There was minimal low-density change within the lesion suggesting central necrosis and subtle contrast enhancement. Alpha-fetoprotein level was normal. CT scan of the chest 11/3/17 showed no evidence of metastatic disease. There was a small hypodense nodule in the right thyroid. She was referred to surgical oncology and underwent resection of segment 6 of the liver 11/24/17 along with cholecystectomy. Final pathology showed a moderately differentiated hepatocellular carcinoma measuring 6.7 cm in greatest dimension. There was no evidence of vascular or perineural invasion. All resection margins were clear.    Surveillance CT scan of the abdomen and pelvis 5/25/18 showed significant progression and recurrence of her disease with a dominant mass in segment 5 of the right hepatic lobe measuring 6.7 cm and a  new mass in the right lobe measuring 3.7 cm in segment 7 and also in segment 8 measuring 1.6 cm.  A new lesion in the left lobe of the liver measured 4.2 cm.  Alpha-fetoprotein level was normal at 4.5 ng/mL.  She was referred for a CT-guided biopsy of the liver 6/5/18.  Pathology confirmed hepatocellular carcinoma, well-differentiated.     She underwent Y-90 radioembolization to the right hepatic artery to a total dose of 335 mGy 6/19/18.  She was evaluated at [facility] 6/20/18 by Dr. Gigi Perkins for consideration of liver transplant.  Based on her disease burden, she did not meet criteria for liver transplantation.  Recommendations were to attempt to downstage her tumor burden through radioembolization and potentially revisit transplantation in the future depending on her response.  Her case was presented at interdisciplinary conference, and these recommendations were supported, along with addition of Nexavar for systemic treatment as tolerated.  Nexavar was initiated 8/2/18. She underwent Y-90 radioembolization of the left hepatic artery 8/14/18 to a total dose of 256 mGy.  Nexavar was discontinued secondary to significant toxicity including a severe rash, alopecia, anemia and transaminitis.    She was seen at [facility] 10/18/18 by Dr. Christopher for a second treatment opinion. She was not eligible for any current clinical trials at their institution. Her pathology was requested and submitted for next generation sequencing to determine if she had any targetable mutations. We discussed further treatment and both Dr. Christopher and I agreed that her best option for further therapy would be treatment with Nivolumab given the uncharacteristic nature and behavior of her multifocal hepatocellular carcinoma area did benefits, risks and toxicities were reviewed and discussed with the patient and her  and treatment was initiated 10/25/18.    Restaging CT of the abdomen 1/15/19 after 3 months of therapy showed significant  disease regression.  The reference left hepatic lobe mass measured 4.8 x 2.0 cm (previously 10.1 x 6.9 cm), mass in segment IV was 4.3 x 2.0 cm (previous 7.6 x 7.3 cm) and a posterior right hepatic lobe mass was 2.7 x 2.1 cm (previous 5.2 x 4.3 cm). Her transaminases completely normalized and her anemia resolved. CT scans of the chest, abdomen and pelvis 4/4/19 showed continued decrease in size of the multiple hepatic lesions. Representative left hepatic lobe mass measured 3.6 cm (previous 4.8 cm). There were no new lesions. Spleen was unremarkable. There was a 2 mm noncalcified nodule in the left upper lobe. There was no suspicious mediastinal or hilar adenopathy. CT scans of the chest, abdomen and pelvis 4/2/19 showed continued interval decrease of the hepatic metastatic lesions and a 2 mm noncalcified nodule in the left upper lobe with no new sites of disease. Serial CT scans have shown no significant disease progression.     Interval History  She returns to clinic today by herself for a four-month follow-up visit.  She remains on nivolumab every 4 weeks with excellent tolerance.  She was having diarrhea and had a colonoscopy 10/5/22 showing normal mucosa in the ascending, transverse and descending colon with patchy areas of erythema without bleeding.  Biopsies showed changes of lymphocytic colitis.  She had no significant polyps.  Her symptoms resolved when she discontinued her Paxil.  She is taking an herbal supplement to help with her hot flashes.  She has occasional abdominal bloating.  No change in her appetite or weight.  Previous CT of the abdomen and pelvis 7/6/22 showed stable irregular calcification and post treatment changes in the anterior right and left hepatic lobes unchanged from 10/11/21.  There were no arterial enhancing lesions, retroperitoneal adenopathy or evidence of disease recurrence.  Barring any new symptoms, follow-up imaging was recommended in 1 year.    Review of Systems  "  Constitutional:  Negative for appetite change, fatigue, fever and unexpected weight change.   HENT:  Negative for mouth sores, sore throat and trouble swallowing.    Eyes: Negative.    Respiratory:  Negative for cough, chest tightness and shortness of breath.    Cardiovascular:  Negative for chest pain, palpitations and leg swelling.   Gastrointestinal:  Positive for diarrhea (Resolved). Negative for abdominal distention, abdominal pain, change in bowel habit, constipation, nausea, vomiting and change in bowel habit.   Genitourinary:  Negative for dysuria, frequency and urgency.   Musculoskeletal:  Positive for arthralgias (Mild). Negative for back pain.   Integumentary:  Negative for rash and mole/lesion.   Neurological:  Negative for dizziness, weakness, numbness and headaches.   Hematological:  Negative for adenopathy. Does not bruise/bleed easily.   Psychiatric/Behavioral: Negative.         PMHx:  Arthritis, breast cancer  PSHx:  Tonsils, cholecystectomy, left foot surgery, hysterectomy/BSO, right breast lumpectomy, bilateral mastectomies with reconstruction, HCC resection x2, Y-90 embolization  SH:  Lifetime nonsmoker, occasional social alcohol use. Lives in [place] with her , retired .  FH:  Father, paternal uncle and maternal grandfather had kidney cancer. A great aunt had breast cancer.       Objective:        /85 (BP Location: Right arm, Patient Position: Sitting, BP Method: Small (Automatic))   Pulse 68   Temp 96.9 °F (36.1 °C)   Resp 18   Ht 5' 6" (1.676 m)   Wt 68.7 kg (151 lb 6.4 oz)   SpO2 99%   BMI 24.44 kg/m²    Physical Exam  Constitutional:       Appearance: Normal appearance.   HENT:      Head: Normocephalic.      Nose: Nose normal.      Mouth/Throat:      Mouth: Mucous membranes are moist.      Pharynx: Oropharynx is clear. No posterior oropharyngeal erythema.   Eyes:      Extraocular Movements: Extraocular movements intact.      Conjunctiva/sclera: " Conjunctivae normal.      Pupils: Pupils are equal, round, and reactive to light.   Cardiovascular:      Rate and Rhythm: Normal rate and regular rhythm.      Heart sounds: No murmur heard.  Pulmonary:      Breath sounds: Normal breath sounds.   Chest:      Comments: Bilateral mastectomies with ROCIO reconstruction.  No palpable masses, skin changes or axillary nodes bilaterally.  Abdominal:      General: Abdomen is flat. Bowel sounds are normal. There is no distension.      Palpations: Abdomen is soft.      Tenderness: There is no abdominal tenderness.      Comments: Well-healed RUQ incision.  No palpable masses or organomegaly.   Musculoskeletal:         General: No swelling or tenderness. Normal range of motion.      Cervical back: Neck supple. No tenderness.   Lymphadenopathy:      Cervical: No cervical adenopathy.   Skin:     General: Skin is warm and dry.      Findings: No rash.   Neurological:      General: No focal deficit present.      Mental Status: She is alert and oriented to person, place, and time.      Cranial Nerves: No cranial nerve deficit.      Gait: Gait normal.     ECOG SCORE    0 - Fully active-able to carry on all pre-disease performance without restriction          LABORATORY  No results found for this or any previous visit (from the past 168 hour(s)).     Laboratory from 10/24/22 reviewed and unremarkable.    Assessment:   Recurrent multifocal hepatocellular carcinoma  Stage II A triple negative breast cancer 10/13 - KASSI  Positive BRCA2 mutation      Plan:   Patient has no clinical or laboratory findings suspicious for disease recurrence.  Continue treatment with nivolumab every 4 weeks.  No significant immune mediated toxicities.  Change laboratory monitoring to every 8 weeks.  RTC in 4 months for a follow-up visit and clinical exam.  Repeat imaging studies in 8 months.      PHAM WOLF MD    Other Physicians  Dr. Arthur Patel ()  Dr. Richard Perera

## 2022-11-08 ENCOUNTER — OFFICE VISIT (OUTPATIENT)
Dept: HEMATOLOGY/ONCOLOGY | Facility: CLINIC | Age: 67
End: 2022-11-08
Payer: MEDICARE

## 2022-11-08 VITALS
SYSTOLIC BLOOD PRESSURE: 123 MMHG | TEMPERATURE: 97 F | OXYGEN SATURATION: 99 % | DIASTOLIC BLOOD PRESSURE: 85 MMHG | HEART RATE: 68 BPM | HEIGHT: 66 IN | WEIGHT: 151.38 LBS | BODY MASS INDEX: 24.33 KG/M2 | RESPIRATION RATE: 18 BRPM

## 2022-11-08 DIAGNOSIS — C22.0: Primary | ICD-10-CM

## 2022-11-08 DIAGNOSIS — Z17.0 MALIGNANT NEOPLASM OF CENTRAL PORTION OF RIGHT BREAST IN FEMALE, ESTROGEN RECEPTOR POSITIVE: ICD-10-CM

## 2022-11-08 DIAGNOSIS — C50.111 MALIGNANT NEOPLASM OF CENTRAL PORTION OF RIGHT BREAST IN FEMALE, ESTROGEN RECEPTOR POSITIVE: ICD-10-CM

## 2022-11-08 DIAGNOSIS — Z15.09 BRCA2 GENE MUTATION POSITIVE: ICD-10-CM

## 2022-11-08 DIAGNOSIS — Z13.29 SCREENING FOR THYROID DISORDER: ICD-10-CM

## 2022-11-08 DIAGNOSIS — E03.2 HYPOTHYROIDISM DUE TO DRUGS: ICD-10-CM

## 2022-11-08 DIAGNOSIS — Z15.01 BRCA2 GENE MUTATION POSITIVE: ICD-10-CM

## 2022-11-08 PROCEDURE — 99999 PR PBB SHADOW E&M-EST. PATIENT-LVL IV: CPT | Mod: PBBFAC,,, | Performed by: INTERNAL MEDICINE

## 2022-11-08 PROCEDURE — 99999 PR PBB SHADOW E&M-EST. PATIENT-LVL IV: ICD-10-PCS | Mod: PBBFAC,,, | Performed by: INTERNAL MEDICINE

## 2022-11-08 PROCEDURE — 99214 OFFICE O/P EST MOD 30 MIN: CPT | Mod: S$PBB,,, | Performed by: INTERNAL MEDICINE

## 2022-11-08 PROCEDURE — 99214 PR OFFICE/OUTPT VISIT, EST, LEVL IV, 30-39 MIN: ICD-10-PCS | Mod: S$PBB,,, | Performed by: INTERNAL MEDICINE

## 2022-11-08 PROCEDURE — 99214 OFFICE O/P EST MOD 30 MIN: CPT | Mod: PBBFAC | Performed by: INTERNAL MEDICINE

## 2022-11-08 RX ORDER — HEPARIN 100 UNIT/ML
500 SYRINGE INTRAVENOUS
Status: CANCELLED | OUTPATIENT
Start: 2022-11-21

## 2022-11-08 RX ORDER — SODIUM CHLORIDE 0.9 % (FLUSH) 0.9 %
10 SYRINGE (ML) INJECTION
Status: CANCELLED | OUTPATIENT
Start: 2022-11-21

## 2022-11-10 ENCOUNTER — TELEPHONE (OUTPATIENT)
Dept: HEMATOLOGY/ONCOLOGY | Facility: CLINIC | Age: 67
End: 2022-11-10
Payer: MEDICARE

## 2022-11-10 NOTE — TELEPHONE ENCOUNTER
I spoke with Dr. Schneider and he adjusted her orders so we can get her dates corrected. I messaged Billie with correct dates.

## 2022-11-21 ENCOUNTER — INFUSION (OUTPATIENT)
Dept: INFUSION THERAPY | Facility: HOSPITAL | Age: 67
End: 2022-11-21
Attending: INTERNAL MEDICINE
Payer: MEDICARE

## 2022-11-21 VITALS
HEART RATE: 77 BPM | OXYGEN SATURATION: 98 % | TEMPERATURE: 97 F | RESPIRATION RATE: 18 BRPM | SYSTOLIC BLOOD PRESSURE: 138 MMHG | DIASTOLIC BLOOD PRESSURE: 90 MMHG

## 2022-11-21 DIAGNOSIS — C22.0 HCC (HEPATOCELLULAR CARCINOMA): Primary | ICD-10-CM

## 2022-11-21 PROCEDURE — 96413 CHEMO IV INFUSION 1 HR: CPT

## 2022-11-21 PROCEDURE — 63600175 PHARM REV CODE 636 W HCPCS: Mod: JG | Performed by: INTERNAL MEDICINE

## 2022-11-21 PROCEDURE — 25000003 PHARM REV CODE 250: Performed by: INTERNAL MEDICINE

## 2022-11-21 RX ORDER — SODIUM CHLORIDE 0.9 % (FLUSH) 0.9 %
10 SYRINGE (ML) INJECTION
Status: DISCONTINUED | OUTPATIENT
Start: 2022-11-21 | End: 2022-11-21 | Stop reason: HOSPADM

## 2022-11-21 RX ORDER — HEPARIN 100 UNIT/ML
500 SYRINGE INTRAVENOUS
Status: DISCONTINUED | OUTPATIENT
Start: 2022-11-21 | End: 2022-11-21 | Stop reason: HOSPADM

## 2022-11-21 RX ADMIN — SODIUM CHLORIDE 480 MG: 9 INJECTION, SOLUTION INTRAVENOUS at 09:11

## 2022-12-12 RX ORDER — SODIUM CHLORIDE 0.9 % (FLUSH) 0.9 %
10 SYRINGE (ML) INJECTION
Status: CANCELLED | OUTPATIENT
Start: 2022-12-19

## 2022-12-12 RX ORDER — HEPARIN 100 UNIT/ML
500 SYRINGE INTRAVENOUS
Status: CANCELLED | OUTPATIENT
Start: 2022-12-19

## 2022-12-19 ENCOUNTER — INFUSION (OUTPATIENT)
Dept: INFUSION THERAPY | Facility: HOSPITAL | Age: 67
End: 2022-12-19
Attending: INTERNAL MEDICINE
Payer: MEDICARE

## 2022-12-19 VITALS
HEART RATE: 80 BPM | TEMPERATURE: 99 F | WEIGHT: 156.19 LBS | SYSTOLIC BLOOD PRESSURE: 101 MMHG | DIASTOLIC BLOOD PRESSURE: 72 MMHG | OXYGEN SATURATION: 96 % | RESPIRATION RATE: 18 BRPM | BODY MASS INDEX: 25.21 KG/M2

## 2022-12-19 DIAGNOSIS — C22.0 HCC (HEPATOCELLULAR CARCINOMA): Primary | ICD-10-CM

## 2022-12-19 PROCEDURE — 63600175 PHARM REV CODE 636 W HCPCS: Mod: JG | Performed by: INTERNAL MEDICINE

## 2022-12-19 PROCEDURE — 96413 CHEMO IV INFUSION 1 HR: CPT

## 2022-12-19 PROCEDURE — 25000003 PHARM REV CODE 250: Performed by: INTERNAL MEDICINE

## 2022-12-19 RX ORDER — SODIUM CHLORIDE 0.9 % (FLUSH) 0.9 %
10 SYRINGE (ML) INJECTION
Status: DISCONTINUED | OUTPATIENT
Start: 2022-12-19 | End: 2022-12-19 | Stop reason: HOSPADM

## 2022-12-19 RX ORDER — HEPARIN 100 UNIT/ML
500 SYRINGE INTRAVENOUS
Status: DISCONTINUED | OUTPATIENT
Start: 2022-12-19 | End: 2022-12-19 | Stop reason: HOSPADM

## 2022-12-19 RX ADMIN — SODIUM CHLORIDE 480 MG: 9 INJECTION, SOLUTION INTRAVENOUS at 11:12

## 2022-12-31 ENCOUNTER — PATIENT MESSAGE (OUTPATIENT)
Dept: HEMATOLOGY/ONCOLOGY | Facility: CLINIC | Age: 67
End: 2022-12-31
Payer: MEDICARE

## 2023-01-13 RX ORDER — HEPARIN 100 UNIT/ML
500 SYRINGE INTRAVENOUS
Status: CANCELLED | OUTPATIENT
Start: 2023-01-16

## 2023-01-13 RX ORDER — SODIUM CHLORIDE 0.9 % (FLUSH) 0.9 %
10 SYRINGE (ML) INJECTION
Status: CANCELLED | OUTPATIENT
Start: 2023-01-16

## 2023-01-17 ENCOUNTER — INFUSION (OUTPATIENT)
Dept: INFUSION THERAPY | Facility: HOSPITAL | Age: 68
End: 2023-01-17
Attending: INTERNAL MEDICINE
Payer: MEDICARE

## 2023-01-17 VITALS
HEIGHT: 66 IN | RESPIRATION RATE: 18 BRPM | DIASTOLIC BLOOD PRESSURE: 90 MMHG | HEART RATE: 71 BPM | SYSTOLIC BLOOD PRESSURE: 127 MMHG | WEIGHT: 156.31 LBS | OXYGEN SATURATION: 97 % | BODY MASS INDEX: 25.12 KG/M2 | TEMPERATURE: 99 F

## 2023-01-17 DIAGNOSIS — C22.0 HCC (HEPATOCELLULAR CARCINOMA): Primary | ICD-10-CM

## 2023-01-17 PROCEDURE — 63600175 PHARM REV CODE 636 W HCPCS: Mod: JG | Performed by: NURSE PRACTITIONER

## 2023-01-17 PROCEDURE — 96413 CHEMO IV INFUSION 1 HR: CPT

## 2023-01-17 PROCEDURE — 25000003 PHARM REV CODE 250: Performed by: NURSE PRACTITIONER

## 2023-01-17 RX ORDER — HEPARIN 100 UNIT/ML
500 SYRINGE INTRAVENOUS
Status: DISCONTINUED | OUTPATIENT
Start: 2023-01-17 | End: 2023-01-17 | Stop reason: HOSPADM

## 2023-01-17 RX ORDER — SODIUM CHLORIDE 0.9 % (FLUSH) 0.9 %
10 SYRINGE (ML) INJECTION
Status: DISCONTINUED | OUTPATIENT
Start: 2023-01-17 | End: 2023-01-17 | Stop reason: HOSPADM

## 2023-01-17 RX ADMIN — SODIUM CHLORIDE 480 MG: 9 INJECTION, SOLUTION INTRAVENOUS at 09:01

## 2023-01-17 RX ADMIN — SODIUM CHLORIDE: 9 INJECTION, SOLUTION INTRAVENOUS at 09:01

## 2023-02-09 RX ORDER — HEPARIN 100 UNIT/ML
500 SYRINGE INTRAVENOUS
Status: CANCELLED | OUTPATIENT
Start: 2023-02-13

## 2023-02-09 RX ORDER — SODIUM CHLORIDE 0.9 % (FLUSH) 0.9 %
10 SYRINGE (ML) INJECTION
Status: CANCELLED | OUTPATIENT
Start: 2023-02-13

## 2023-02-14 ENCOUNTER — INFUSION (OUTPATIENT)
Dept: INFUSION THERAPY | Facility: HOSPITAL | Age: 68
End: 2023-02-14
Attending: INTERNAL MEDICINE
Payer: MEDICARE

## 2023-02-14 ENCOUNTER — APPOINTMENT (OUTPATIENT)
Dept: LAB | Facility: HOSPITAL | Age: 68
End: 2023-02-14
Attending: INTERNAL MEDICINE
Payer: MEDICARE

## 2023-02-14 VITALS
BODY MASS INDEX: 25.12 KG/M2 | RESPIRATION RATE: 18 BRPM | SYSTOLIC BLOOD PRESSURE: 138 MMHG | TEMPERATURE: 99 F | DIASTOLIC BLOOD PRESSURE: 83 MMHG | HEIGHT: 66 IN | HEART RATE: 62 BPM | WEIGHT: 156.31 LBS

## 2023-02-14 DIAGNOSIS — C22.0 HCC (HEPATOCELLULAR CARCINOMA): Primary | ICD-10-CM

## 2023-02-14 PROCEDURE — 63600175 PHARM REV CODE 636 W HCPCS: Mod: JG | Performed by: INTERNAL MEDICINE

## 2023-02-14 PROCEDURE — 96413 CHEMO IV INFUSION 1 HR: CPT

## 2023-02-14 PROCEDURE — 25000003 PHARM REV CODE 250: Performed by: INTERNAL MEDICINE

## 2023-02-14 RX ORDER — HEPARIN 100 UNIT/ML
500 SYRINGE INTRAVENOUS
Status: DISCONTINUED | OUTPATIENT
Start: 2023-02-14 | End: 2023-02-14 | Stop reason: HOSPADM

## 2023-02-14 RX ORDER — SODIUM CHLORIDE 0.9 % (FLUSH) 0.9 %
10 SYRINGE (ML) INJECTION
Status: DISCONTINUED | OUTPATIENT
Start: 2023-02-14 | End: 2023-02-14 | Stop reason: HOSPADM

## 2023-02-14 RX ADMIN — SODIUM CHLORIDE 480 MG: 9 INJECTION, SOLUTION INTRAVENOUS at 01:02

## 2023-02-24 NOTE — PROGRESS NOTES
Subjective:       Patient ID: Maci Lewis is a 67 y.o. female.    Chief Complaint:  I feel fine    Diagnosis: Recurrent multifocal hepatocellular carcinoma                    Stage IIA right breast cancer 10/13 (T2 N0 M0), 2.4 cm, grade III, ER/AK neg, Her-2 neg                    Positive BRCA2 mutation                    S/p bilateral mastectomies and BSO                     + COVID-19 vaccinated    Treatment History  AC x4 (2/14) -->weekly Taxol x12 (6//14) ---> XRT x 14 days ---> bilateral mastectomies  Partial right hepatectomy 7/16  Right hepatic segmentectomy 11/17  Y-90 RHA 6/19/18; Y-90 LHA 8/14/18  Nexavar 8/2-8/16/18    Current Treatment: Nivolumab Q 4 weeks (started 10/25/18)    Clinical History:  Patient underwent right breast lumpectomy and SLND 10/28/13. Final pathology showed a 2.5 cm grade 3 infiltrating ductal carcinoma with clear margins, no lymphovascular invasion and 6 negative LN's.  She completed adjuvant chemotherapy followed by radiation as outlined above.  Genetic testing was positive for a BRCA2 gene mutation.  She underwent bilateral mastectomies and ROCIO reconstruction 12/8/14    She presented to the hospital 7/24/16 with acute right neck and shoulder pain, followed by abdominal pain and distention.  She was anemic and hypotensive. CT of the abdomen showed 2 complex heterogeneous masses along the inferior aspect of the right hepatic lobe.  The larger more peripheral mass showed evidence of active hemorrhage.  There was a large subcapsular hematoma with free fluid in the pelvis.  Attempted embolization by interventional radiology was unsuccessful.  She was taken to the OR for exploratory laparotomy.  A large intra-abdominal blood collection was evacuated.  There was tumor in hepatic segment 6 with necrosis and rupture with some minimal oozing but no active bleeding.  Partial right hepatectomy was performed.  Intraoperative ultrasound of the liver showed no other suspicious  lesions.  Alpha-fetoprotein level was normal.  Final pathology showed a 5.3 cm intermediate grade hepatocellular carcinoma with focal hemorrhagic infarction, fibrosis and hepatic capsular rupture.  Margins of resection were clear. CT A/P 10/14/16 showed postoperative changes in the lateral inferior margin of the right hepatic lobe and a small fluid collection. There was no abnormal contrast enhancement or evidence of suspicious liver lesions. Repeat scans 2/14/17 showed stable postoperative changes with improved small perihepatic fluid collection.    CT of the abdomen and pelvis 7/24/17 showed postsurgical changes along the inferior right hepatic lobe with a new area of hypoattenuation inferiorly along the suture line measuring up to about 12 mm. This finding was not associated with contrast enhancement to suggest recurrence. Short-term interval follow-up was recommended in 3 months by Radiology. There were small stable hepatic cysts. Follow-up CT scan 10/25/17 showed significant increase of the hypodense lesion adjacent to the suture line in the right hepatic lobe to 4.7 x 4.2 cm. There was minimal low-density change within the lesion suggesting central necrosis and subtle contrast enhancement. Alpha-fetoprotein level was normal. CT scan of the chest 11/3/17 showed no evidence of metastatic disease. There was a small hypodense nodule in the right thyroid. She was referred to surgical oncology and underwent resection of segment 6 of the liver 11/24/17 along with cholecystectomy. Final pathology showed a moderately differentiated hepatocellular carcinoma measuring 6.7 cm in greatest dimension. There was no evidence of vascular or perineural invasion. All resection margins were clear.    Surveillance CT scan of the abdomen and pelvis 5/25/18 showed significant progression and recurrence of her disease with a dominant mass in segment 5 of the right hepatic lobe measuring 6.7 cm and a new mass in the right lobe  measuring 3.7 cm in segment 7 and also in segment 8 measuring 1.6 cm.  A new lesion in the left lobe of the liver measured 4.2 cm.  Alpha-fetoprotein level was normal at 4.5 ng/mL.  CT-guided biopsy 6/5/18 confirmed hepatocellular carcinoma, well-differentiated.     She underwent Y-90 radioembolization to the right hepatic artery to a total dose of 335 mGy 6/19/18.  She was evaluated at [facility] 6/20/18 by Dr. Gigi Perkins for consideration of liver transplant.  Based on her disease burden, she did not meet criteria for liver transplantation.  Recommendations were to attempt to downstage her tumor burden through radioembolization and potentially revisit transplantation in the future depending on her response.  Her case was presented at interdisciplinary conference, and these recommendations were supported, along with addition of Nexavar for systemic treatment as tolerated.  Nexavar was initiated 8/2/18. She underwent Y-90 radioembolization of the left hepatic artery 8/14/18 to a total dose of 256 mGy.  Nexavar was discontinued secondary to significant toxicity including a severe rash, alopecia, anemia and transaminitis.    She was seen at [facility] 10/18/18 by Dr. Christopher for a second treatment opinion. She was not eligible for any current clinical trials at their institution. Her pathology was requested and submitted for next generation sequencing to determine if she had any targetable mutations. We discussed further treatment and both Dr. Christopher and I agreed that her best option for further therapy would be treatment with Nivolumab given the uncharacteristic nature and behavior of her multifocal hepatocellular carcinoma.  Treatment was initiated 10/25/18.    Restaging CT of the abdomen 1/15/19 after 3 months of therapy showed significant disease regression.  The reference left hepatic lobe mass measured 4.8 x 2.0 cm (previously 10.1 x 6.9 cm), mass in segment IV was 4.3 x 2.0 cm (previous 7.6 x 7.3 cm) and a posterior  right hepatic lobe mass was 2.7 x 2.1 cm (previous 5.2 x 4.3 cm). Her transaminases completely normalized and her anemia resolved. CT scans of the chest, abdomen and pelvis 4/4/19 showed continued decrease in size of the multiple hepatic lesions. Representative left hepatic lobe mass measured 3.6 cm (previous 4.8 cm). There were no new lesions. Spleen was unremarkable. There was a 2 mm noncalcified nodule in the left upper lobe. There was no suspicious mediastinal or hilar adenopathy. CT scans of the chest, abdomen and pelvis 4/2/19 showed continued interval decrease of the hepatic metastatic lesions and a 2 mm noncalcified nodule in the left upper lobe with no new sites of disease. Serial CT scans have shown no significant disease progression.     Interval History  She returns to the office today by herself for a four-month follow-up visit.  She remains on nivolumab every 4 weeks.  She continues to tolerate treatment well with no evidence of immune mediated toxicity.  She has no abdominal symptoms or complaints.  Appetite is excellent, no weight loss.  Laboratory testing shows no significant abnormalities.  LFTs remain normal.  CT A/P 7/6/22 showed stable irregular calcification and post treatment changes in the anterior right and left hepatic lobes unchanged from 10/11/21.  There were no arterial enhancing lesions, retroperitoneal adenopathy or evidence of disease recurrence.  Barring any new symptoms, follow-up imaging was recommended in 1 year.    Review of Systems   Constitutional:  Negative for appetite change, fatigue, fever and unexpected weight change.   HENT:  Negative for mouth sores, sore throat and trouble swallowing.    Eyes: Negative.    Respiratory:  Negative for cough and shortness of breath.    Cardiovascular:  Negative for chest pain, palpitations and leg swelling.   Gastrointestinal:  Negative for abdominal distention, abdominal pain, change in bowel habit, constipation, diarrhea, nausea,  "vomiting and change in bowel habit.   Genitourinary:  Negative for dysuria, frequency and urgency.   Musculoskeletal:  Positive for arthralgias (Mild). Negative for back pain.   Integumentary:  Negative for rash and mole/lesion.   Neurological:  Negative for dizziness, weakness, numbness and headaches.   Hematological:  Negative for adenopathy. Does not bruise/bleed easily.   Psychiatric/Behavioral: Negative.         PMHx:  Arthritis, breast cancer  PSHx:  Tonsils, cholecystectomy, left foot surgery, hysterectomy/BSO, right breast lumpectomy, bilateral mastectomies with reconstruction, HCC resection x2, Y-90 embolization  SH:  Lifetime nonsmoker, occasional social alcohol use. Lives in [place] with her , retired .  FH:  Father, paternal uncle and maternal grandfather had kidney cancer. A great aunt had breast cancer.     Objective:        BP (!) 143/87   Pulse 83   Temp 98.3 °F (36.8 °C)   Resp 18   Ht 5' 6" (1.676 m)   Wt 68.7 kg (151 lb 6.4 oz)   SpO2 99%   BMI 24.44 kg/m²    Physical Exam  Constitutional:       Comments: Well-developed white female in NAD   HENT:      Head: Normocephalic.      Mouth/Throat:      Mouth: Mucous membranes are moist.      Pharynx: Oropharynx is clear. No posterior oropharyngeal erythema.   Eyes:      Extraocular Movements: Extraocular movements intact.      Conjunctiva/sclera: Conjunctivae normal.      Pupils: Pupils are equal, round, and reactive to light.   Cardiovascular:      Rate and Rhythm: Normal rate and regular rhythm.      Heart sounds: No murmur heard.  Pulmonary:      Comments: Lungs clear to auscultation  Chest:      Comments: Bilateral mastectomies with ROCIO reconstruction.  No palpable masses, skin changes or axillary nodes bilaterally.  Abdominal:      General: Bowel sounds are normal. There is no distension.      Palpations: Abdomen is soft.      Tenderness: There is no abdominal tenderness.      Comments: Well-healed RUQ incision.  No " palpable masses or organomegaly.   Musculoskeletal:         General: No swelling or tenderness. Normal range of motion.      Cervical back: Neck supple. No tenderness.   Skin:     General: Skin is warm and dry.      Findings: No rash.   Neurological:      General: No focal deficit present.      Mental Status: She is oriented to person, place, and time.      Cranial Nerves: No cranial nerve deficit.      Motor: No weakness.     ECOG SCORE    0 - Fully active-able to carry on all pre-disease performance without restriction          LABORATORY  Recent Results (from the past 168 hour(s))   Lipid Panel    Collection Time: 02/28/23  7:12 AM   Result Value Ref Range    Cholesterol Total 218 (H) <=200 mg/dL    HDL Cholesterol 55 35 - 60 mg/dL    Triglyceride 125 37 - 140 mg/dL    Cholesterol/HDL Ratio 4 0 - 5    Very Low Density Lipoprotein 25     LDL Cholesterol 138.00 50.00 - 140.00 mg/dL          Assessment:   Recurrent multifocal hepatocellular carcinoma  Stage IIA triple negative breast cancer 10/13 - KASSI  Positive BRCA2 mutation      Plan:   Patient continues to do well with no clinical or laboratory findings suspicious for disease recurrence.  Continue nivolumab every 4 weeks.  Next cycle scheduled for 3/14/23.  Continue laboratory monitoring including CMP, CBC and TSH level every 8 weeks.  RTC in 4 months with CT scans of the chest, abdomen and pelvis.      PHAM WOLF MD    Other Physicians  Dr. Arthur Patel ()  Dr. Richard Perera

## 2023-02-27 ENCOUNTER — TELEPHONE (OUTPATIENT)
Dept: INTERNAL MEDICINE | Facility: CLINIC | Age: 68
End: 2023-02-27
Payer: MEDICARE

## 2023-02-27 DIAGNOSIS — Z13.6 ENCOUNTER FOR LIPID SCREENING FOR CARDIOVASCULAR DISEASE: Primary | ICD-10-CM

## 2023-02-27 DIAGNOSIS — Z13.220 ENCOUNTER FOR LIPID SCREENING FOR CARDIOVASCULAR DISEASE: Primary | ICD-10-CM

## 2023-02-27 NOTE — TELEPHONE ENCOUNTER
----- Message from Hero Hernandez LPN sent at 2/27/2023 10:58 AM CST -----  Regarding: janet walters 3/7 @9  Are there any outstanding tasks in patient chart? Needs fasting labs    Is there documentation of outstanding tasks in patient chart? no    Has patient been to the ER, urgent care, or another physician since last visit?    Has patient done any blood work or x-rays since last visit?

## 2023-02-28 ENCOUNTER — LAB VISIT (OUTPATIENT)
Dept: LAB | Facility: HOSPITAL | Age: 68
End: 2023-02-28
Attending: INTERNAL MEDICINE
Payer: MEDICARE

## 2023-02-28 DIAGNOSIS — Z13.6 ENCOUNTER FOR LIPID SCREENING FOR CARDIOVASCULAR DISEASE: ICD-10-CM

## 2023-02-28 DIAGNOSIS — Z13.220 ENCOUNTER FOR LIPID SCREENING FOR CARDIOVASCULAR DISEASE: ICD-10-CM

## 2023-02-28 LAB
CHOLEST SERPL-MCNC: 218 MG/DL
CHOLEST/HDLC SERPL: 4 {RATIO} (ref 0–5)
HDLC SERPL-MCNC: 55 MG/DL (ref 35–60)
LDLC SERPL CALC-MCNC: 138 MG/DL (ref 50–140)
TRIGL SERPL-MCNC: 125 MG/DL (ref 37–140)
VLDLC SERPL CALC-MCNC: 25 MG/DL

## 2023-02-28 PROCEDURE — 36415 COLL VENOUS BLD VENIPUNCTURE: CPT

## 2023-02-28 PROCEDURE — 80061 LIPID PANEL: CPT

## 2023-03-01 ENCOUNTER — PATIENT MESSAGE (OUTPATIENT)
Dept: HEMATOLOGY/ONCOLOGY | Facility: CLINIC | Age: 68
End: 2023-03-01

## 2023-03-01 ENCOUNTER — TELEPHONE (OUTPATIENT)
Dept: HEMATOLOGY/ONCOLOGY | Facility: CLINIC | Age: 68
End: 2023-03-01

## 2023-03-01 ENCOUNTER — OFFICE VISIT (OUTPATIENT)
Dept: HEMATOLOGY/ONCOLOGY | Facility: CLINIC | Age: 68
End: 2023-03-01
Payer: MEDICARE

## 2023-03-01 VITALS
TEMPERATURE: 98 F | OXYGEN SATURATION: 99 % | WEIGHT: 151.38 LBS | HEART RATE: 83 BPM | RESPIRATION RATE: 18 BRPM | HEIGHT: 66 IN | DIASTOLIC BLOOD PRESSURE: 87 MMHG | BODY MASS INDEX: 24.33 KG/M2 | SYSTOLIC BLOOD PRESSURE: 143 MMHG

## 2023-03-01 DIAGNOSIS — C22.0 HEPATOCELLULAR CARCINOMA: Primary | ICD-10-CM

## 2023-03-01 DIAGNOSIS — Z15.01 BRCA2 GENE MUTATION POSITIVE: ICD-10-CM

## 2023-03-01 DIAGNOSIS — Z15.09 BRCA2 GENE MUTATION POSITIVE: ICD-10-CM

## 2023-03-01 DIAGNOSIS — C50.111 MALIGNANT NEOPLASM OF CENTRAL PORTION OF RIGHT BREAST IN FEMALE, ESTROGEN RECEPTOR POSITIVE: ICD-10-CM

## 2023-03-01 DIAGNOSIS — E03.2 HYPOTHYROIDISM DUE TO DRUGS: ICD-10-CM

## 2023-03-01 DIAGNOSIS — Z17.0 MALIGNANT NEOPLASM OF CENTRAL PORTION OF RIGHT BREAST IN FEMALE, ESTROGEN RECEPTOR POSITIVE: ICD-10-CM

## 2023-03-01 PROCEDURE — 99214 OFFICE O/P EST MOD 30 MIN: CPT | Mod: S$PBB,,, | Performed by: INTERNAL MEDICINE

## 2023-03-01 PROCEDURE — 99999 PR PBB SHADOW E&M-EST. PATIENT-LVL IV: ICD-10-PCS | Mod: PBBFAC,,, | Performed by: INTERNAL MEDICINE

## 2023-03-01 PROCEDURE — 99214 PR OFFICE/OUTPT VISIT, EST, LEVL IV, 30-39 MIN: ICD-10-PCS | Mod: S$PBB,,, | Performed by: INTERNAL MEDICINE

## 2023-03-01 PROCEDURE — 99214 OFFICE O/P EST MOD 30 MIN: CPT | Mod: PBBFAC | Performed by: INTERNAL MEDICINE

## 2023-03-01 PROCEDURE — 99999 PR PBB SHADOW E&M-EST. PATIENT-LVL IV: CPT | Mod: PBBFAC,,, | Performed by: INTERNAL MEDICINE

## 2023-03-01 RX ORDER — VENLAFAXINE HYDROCHLORIDE 37.5 MG/1
37.5 CAPSULE, EXTENDED RELEASE ORAL NIGHTLY
COMMUNITY
Start: 2023-02-07

## 2023-03-07 ENCOUNTER — OFFICE VISIT (OUTPATIENT)
Dept: INTERNAL MEDICINE | Facility: CLINIC | Age: 68
End: 2023-03-07
Payer: MEDICARE

## 2023-03-07 VITALS
OXYGEN SATURATION: 97 % | DIASTOLIC BLOOD PRESSURE: 82 MMHG | HEART RATE: 69 BPM | SYSTOLIC BLOOD PRESSURE: 124 MMHG | HEIGHT: 66 IN | RESPIRATION RATE: 18 BRPM | BODY MASS INDEX: 24.59 KG/M2 | WEIGHT: 153 LBS

## 2023-03-07 DIAGNOSIS — Z85.3 HISTORY OF BREAST CANCER: ICD-10-CM

## 2023-03-07 DIAGNOSIS — C22.0 HCC (HEPATOCELLULAR CARCINOMA): ICD-10-CM

## 2023-03-07 DIAGNOSIS — Z00.00 ANNUAL PHYSICAL EXAM: Primary | ICD-10-CM

## 2023-03-07 DIAGNOSIS — Z15.09 BRCA2 GENE MUTATION POSITIVE: ICD-10-CM

## 2023-03-07 DIAGNOSIS — Z15.01 BRCA2 GENE MUTATION POSITIVE: ICD-10-CM

## 2023-03-07 DIAGNOSIS — K74.60 HEPATIC CIRRHOSIS, UNSPECIFIED HEPATIC CIRRHOSIS TYPE, UNSPECIFIED WHETHER ASCITES PRESENT: ICD-10-CM

## 2023-03-07 DIAGNOSIS — I70.0 AORTIC ATHEROSCLEROSIS: ICD-10-CM

## 2023-03-07 PROCEDURE — G0439 PPPS, SUBSEQ VISIT: HCPCS | Mod: ,,, | Performed by: INTERNAL MEDICINE

## 2023-03-07 PROCEDURE — G0439 PR MEDICARE ANNUAL WELLNESS SUBSEQUENT VISIT: ICD-10-PCS | Mod: ,,, | Performed by: INTERNAL MEDICINE

## 2023-03-07 NOTE — PROGRESS NOTES
Patient ID: Maci Lewis is a 67 y.o. female.    Chief Complaint: Medicare AWV (Labs 2/14 and 2/28)      Patient Care Team:  Hussein Patel II, MD as PCP - General (Internal Medicine)     HPI:   Patient presents here today for above reason.     Ms. Lux is a 59-year-old female presents today annual visit.  Actually doing great history of hepatocellular carcinoma and history of intraductal carcinoma of the breast doing well and considered in remission right now.  She is followed by Hematology-Oncology every 4 months with lab work.  I doing great age-appropriate screening up-to-date labs are all within normal limits cholesterol slightly borderline.  Here for annual. Recent c-scope, neg.    The patient's Health Maintenance was reviewed and the following appears to be due at this time:   Health Maintenance Due   Topic Date Due    Hepatitis C Screening  Never done    Shingles Vaccine (1 of 2) Never done        Past Medical History:  Past Medical History:   Diagnosis Date    Arthritis     BRCA2 gene mutation positive 10/18/2018    Breast cancer     Cancer     Hepatocellular    History of breast cancer 10/18/2018     Past Surgical History:   Procedure Laterality Date    BREAST LUMPECTOMY Right     BREAST RECONSTRUCTION      Reconstruction twice    CHOLECYSTECTOMY      HEEL SPUR SURGERY Left     LIVER RESECTION      2016 and 2017     MASTECTOMY      double 2014    OOPHORECTOMY      hysterectomy/ BSO    TONSILLECTOMY  1959    Y-90 embolization       Review of patient's allergies indicates:  No Known Allergies  Current Outpatient Medications on File Prior to Visit   Medication Sig Dispense Refill    biotin 1 mg tablet Take 5,000 mcg by mouth once daily.       calcium carbonate (OS-MENDEZ) 600 mg calcium (1,500 mg) Tab Take by mouth.      calcium-vitamin D (OSCAL) 250 (625)-125 mg-unit per tablet Take 1 tablet by mouth once daily.      INTRAROSA 6.5 mg Inst Place 1 tablet vaginally once daily.      multivitamin  (THERAGRAN) per tablet Take 1 tablet by mouth once daily.      omega-3 fatty acids fish oil 1,050-1,200 mg Cap capsule Take by mouth.      venlafaxine (EFFEXOR-XR) 37.5 MG 24 hr capsule Take 37.5 mg by mouth every evening.       No current facility-administered medications on file prior to visit.     Social History     Socioeconomic History    Marital status:    Occupational History    Occupation: Retired    Tobacco Use    Smoking status: Never    Smokeless tobacco: Never   Substance and Sexual Activity    Alcohol use: Yes     Comment: social drinking 3-5 drinks weekly    Drug use: No     Comment: patient denies     Family History   Problem Relation Age of Onset    Heart disease Father     Kidney cancer Father         RCC    Kidney cancer Maternal Grandfather     Kidney cancer Paternal Uncle         RCC    Breast cancer Maternal Aunt        ROS:   Review of Systems  Constitutional: No weight gain, No fever, No chills, No fatigue.   Eyes: No blurring, No visual disturbances.   Ear/Nose/Mouth/Throat: No decreased hearing, No ear pain, No nasal congestion, No sore throat.   Respiratory: No shortness of breath, No cough, No wheezing.   Cardiovascular: No chest pain, No palpitations, No peripheral edema.   Gastrointestinal: No nausea, No vomiting, No diarrhea, No constipation, No abdominal pain.   Genitourinary: No dysuria, No hematuria.   Hematology/Lymphatics: No bruising tendency, No bleeding tendency, No swollen lymph glands.   Endocrine: No excessive thirst, No polyuria, No excessive hunger.   Musculoskeletal: No joint pain, No muscle pain, No decreased range of motion.   Integumentary: No rash, No pruritus.   Neurologic: No abnormal balance, No confusion, No headache.   Psychiatric: No anxiety, No depression, Not suicidal, No hallucinations.        Patient Reported Health Risk Assessment  What [age] your age?: 65-69  Are you male or female?: Female  During the past four weeks, how much have you  been bothered by emotional problems such as feeling anxious, depressed, irritable, sad, or downhearted and blue?: Not at all  During the past five weeks, has your physical and/or emotional health limited your social activities with family, friends, neighbors, or groups?: Not at all  During the past four weeks, how much bodily pain have you generally had?: No pain  During the past four weeks, was someone available to help if you needed and wanted help?: Yes, as much as I wanted  During the past four weeks, what was the hardest physical activity you could do for at least two minutes?: Moderate  Can you get to places out of walking distance without help?  (For example, can you travel alone on buses or taxis, or drive your own car?): Yes  Can you go shopping for groceries or clothes without someone's help?: Yes  Can you prepare your own meals?: Yes  Can you do your own housework without help?: Yes  Because of any health problems, do you need the help of another person with your personal care needs such as eating, bathing, dressing, or getting around the house?: No  Can you handle your own money without help?: Yes  During the past four weeks, how would you rate your health in general?: Excellent  How have things been going for you during the past four weeks?: Very well  Are you having difficulties driving your car?: No  Do you always fasten your seat belt when you are in a car?: Yes, usually  How often in the past four weeks have you been bothered by falling or dizzy when standing up?: Never  How often in the past four weeks have you been bothered by sexual problems?: Never  How often in the past four weeks have you been bothered by trouble eating well?: Never  How often in the past four weeks have you been bothered by teeth or denture problems?: Never  How often in the past four weeks have you been bothered with problems using the telephone?: Never  How often in the past four weeks have you been bothered by tiredness or  "fatigue?: Never  Have you fallen two or more times in the past year?: No  Are you afraid of falling?: No  Are you a smoker?: No  During the past four weeks, how many drinks of wine, beer, or other alcoholic beverages did you have?: No alcohol at all  Do you exercise for about 20 minutes three or more days a week?: Yes, some of the time  Have you been given any information to help you with hazards in your house that might hurt you?: Yes  Have you been given any information to help you with keeping track of your medications?: Yes  How often do you have trouble taking medicines the way you've been told to take them?: I always take them as prescribed  How confident are you that you can control and manage most of your health problems?: Very confident  What is your race? (Check all that apply.):     Vitals/PE:   /82 (BP Location: Left arm, Patient Position: Sitting)   Pulse 69   Resp 18   Ht 5' 6" (1.676 m)   Wt 69.4 kg (153 lb)   SpO2 97%   BMI 24.69 kg/m²   Physical Exam    General: Alert and oriented, No acute distress.   Eye: Normal conjunctiva without exudate.  HENMT: Normocephalic/AT, Normal hearing, Oral mucosa is moist and pink   Neck: No goiter visualized.   Respiratory: Lungs CTAB, Respirations are non-labored, Breath sounds are equal, Symmetrical chest wall expansion.  Cardiovascular: Normal rate, Regular rhythm, No murmur, No edema.   Gastrointestinal: Non-distended.   Genitourinary: Deferred.  Musculoskeletal: Normal ROM, Normal gait, No deformities or amputations.  Integumentary: Warm, Dry, Intact. No diaphoresis, or flushing.  Neurologic: No focal deficits, Cranial Nerves II-XII are grossly intact.   Psychiatric: Cooperative, Appropriate mood & affect, Normal judgment, Non-suicidal.        No flowsheet data found.  Fall Risk Assessment - Outpatient 3/7/2023 3/1/2023 2/14/2023 1/17/2023 12/19/2022 11/21/2022 11/8/2022   Mobility Status Ambulatory Ambulatory Ambulatory Ambulatory " "Ambulatory Ambulatory Ambulatory   Number of falls 0 0 0 0 0 0 0   Identified as fall risk 0 0 0 0 0 0 0           Depression Screening  Over the past two weeks, has the patient felt down, depressed, or hopeless?: No  Over the past two weeks, has the patient felt little interest or pleasure in doing things?: No  Functional Ability/Safety Screening  Was the patient's timed Up & Go test unsteady or longer than 30 seconds?: No  Does the patient need help with phone, transportation, shopping, preparing meals, housework, laundry, meds, or managing money?: No  Does the patient's home have rugs in the hallway, lack grab bars in the bathroom, lack handrails on the stairs or have poor lighting?: No  Have you noticed any hearing difficulties?: No  A "Yes" response to any of the above questions should trigger further investigation.: 0  Cognitive Function (Assessed through direct observation with due consideration of information obtained by way of patient reports and/or concerns raised by family, friends, caretakers, or others)    Does the patient repeat questions/statements in the same day?: No  Does the patient have trouble remembering the date, year, and time?: No  Does the patient have difficulty managing finances?: No  Does the patient have a decreased sense of direction?: No  A "Yes" response to any of the above questions could indicate mild cognitive impairment and should trigger further investigation.: 0    Assessment/Plan:       1. Annual physical exam    2. Hepatic cirrhosis, unspecified hepatic cirrhosis type, unspecified whether ascites present    3. Aortic atherosclerosis    4. BRCA2 gene mutation positive    5. History of breast cancer    6. HCC (hepatocellular carcinoma)         Plan:  Overall doing well  No signs of recurrence  Continues with infusion q4 weeks with heme/onc.  Cpmm  Screening uptdoate  Labs wnl   Rtc 1 year.      Education and counseling done face to face regarding medical conditions and plan. " Contact office if new symptoms develop. Should any symptoms ever significantly worsen seek emergency medical attention/go to ER. Follow up at least yearly for wellness or sooner PRN. Nurse to call patient with any results. The patient is receptive, expresses understanding and is agreeable to plan. All questions have been answered.    No follow-ups on file.      Medicare Annual Wellness and Personalized Prevention Plan:   Fall Risk + Home Safety + Hearing Impairment + Depression Screen + Cognitive Impairment Screen + Health Risk Assessment all reviewed.    Advance Care Planning   I attest to discussing Advance Care Planning with patient and/or family member.  Education was provided including the importance of the Health Care Power of , Advance Directives, and/or [place] documentation.  The patient expressed understanding to the importance of this information and discussion.  Length of ACP conversation in minutes:          Opioid Screening: Patient medication list reviewed, patient is not taking prescription opioids. Patient is not using additional opioids than prescribed. Patient is at low risk of substance abuse based on this opioid use history.

## 2023-03-13 RX ORDER — HEPARIN 100 UNIT/ML
500 SYRINGE INTRAVENOUS
Status: CANCELLED | OUTPATIENT
Start: 2023-03-14

## 2023-03-13 RX ORDER — SODIUM CHLORIDE 0.9 % (FLUSH) 0.9 %
10 SYRINGE (ML) INJECTION
Status: CANCELLED | OUTPATIENT
Start: 2023-03-14

## 2023-03-14 ENCOUNTER — INFUSION (OUTPATIENT)
Dept: INFUSION THERAPY | Facility: HOSPITAL | Age: 68
End: 2023-03-14
Attending: INTERNAL MEDICINE
Payer: MEDICARE

## 2023-03-14 VITALS
HEART RATE: 73 BPM | RESPIRATION RATE: 16 BRPM | SYSTOLIC BLOOD PRESSURE: 128 MMHG | TEMPERATURE: 98 F | OXYGEN SATURATION: 98 % | DIASTOLIC BLOOD PRESSURE: 92 MMHG

## 2023-03-14 DIAGNOSIS — C22.0 HCC (HEPATOCELLULAR CARCINOMA): Primary | ICD-10-CM

## 2023-03-14 PROCEDURE — 25000003 PHARM REV CODE 250: Performed by: INTERNAL MEDICINE

## 2023-03-14 PROCEDURE — 63600175 PHARM REV CODE 636 W HCPCS: Mod: JZ,JG | Performed by: INTERNAL MEDICINE

## 2023-03-14 PROCEDURE — 96413 CHEMO IV INFUSION 1 HR: CPT

## 2023-03-14 RX ORDER — HEPARIN 100 UNIT/ML
500 SYRINGE INTRAVENOUS
Status: DISCONTINUED | OUTPATIENT
Start: 2023-03-14 | End: 2023-03-14 | Stop reason: HOSPADM

## 2023-03-14 RX ORDER — SODIUM CHLORIDE 0.9 % (FLUSH) 0.9 %
10 SYRINGE (ML) INJECTION
Status: DISCONTINUED | OUTPATIENT
Start: 2023-03-14 | End: 2023-03-14 | Stop reason: HOSPADM

## 2023-03-14 RX ADMIN — SODIUM CHLORIDE 480 MG: 9 INJECTION, SOLUTION INTRAVENOUS at 10:03

## 2023-04-05 RX ORDER — HEPARIN 100 UNIT/ML
500 SYRINGE INTRAVENOUS
Status: CANCELLED | OUTPATIENT
Start: 2023-04-11

## 2023-04-05 RX ORDER — SODIUM CHLORIDE 0.9 % (FLUSH) 0.9 %
10 SYRINGE (ML) INJECTION
Status: CANCELLED | OUTPATIENT
Start: 2023-04-11

## 2023-04-11 ENCOUNTER — INFUSION (OUTPATIENT)
Dept: INFUSION THERAPY | Facility: HOSPITAL | Age: 68
End: 2023-04-11
Attending: INTERNAL MEDICINE
Payer: MEDICARE

## 2023-04-11 ENCOUNTER — LAB VISIT (OUTPATIENT)
Dept: LAB | Facility: HOSPITAL | Age: 68
End: 2023-04-11
Attending: INTERNAL MEDICINE
Payer: MEDICARE

## 2023-04-11 VITALS
OXYGEN SATURATION: 96 % | SYSTOLIC BLOOD PRESSURE: 127 MMHG | BODY MASS INDEX: 23.54 KG/M2 | HEART RATE: 76 BPM | RESPIRATION RATE: 18 BRPM | HEIGHT: 67 IN | DIASTOLIC BLOOD PRESSURE: 89 MMHG | WEIGHT: 150 LBS | TEMPERATURE: 98 F

## 2023-04-11 DIAGNOSIS — C22.0 HEPATOCELLULAR CARCINOMA: ICD-10-CM

## 2023-04-11 DIAGNOSIS — C22.0 HCC (HEPATOCELLULAR CARCINOMA): Primary | ICD-10-CM

## 2023-04-11 DIAGNOSIS — E03.2 HYPOTHYROIDISM DUE TO DRUGS: ICD-10-CM

## 2023-04-11 LAB
ALBUMIN SERPL-MCNC: 3.9 G/DL (ref 3.4–4.8)
ALBUMIN/GLOB SERPL: 1.2 RATIO (ref 1.1–2)
ALP SERPL-CCNC: 63 UNIT/L (ref 40–150)
ALT SERPL-CCNC: 21 UNIT/L (ref 0–55)
AST SERPL-CCNC: 19 UNIT/L (ref 5–34)
BASOPHILS # BLD AUTO: 0.02 X10(3)/MCL (ref 0–0.2)
BASOPHILS NFR BLD AUTO: 0.6 %
BILIRUBIN DIRECT+TOT PNL SERPL-MCNC: 0.6 MG/DL
BUN SERPL-MCNC: 19.2 MG/DL (ref 9.8–20.1)
CALCIUM SERPL-MCNC: 9.8 MG/DL (ref 8.4–10.2)
CHLORIDE SERPL-SCNC: 105 MMOL/L (ref 98–107)
CO2 SERPL-SCNC: 25 MMOL/L (ref 23–31)
CREAT SERPL-MCNC: 0.91 MG/DL (ref 0.55–1.02)
EOSINOPHIL # BLD AUTO: 0.21 X10(3)/MCL (ref 0–0.9)
EOSINOPHIL NFR BLD AUTO: 6.1 %
ERYTHROCYTE [DISTWIDTH] IN BLOOD BY AUTOMATED COUNT: 12.1 % (ref 11.5–17)
GFR SERPLBLD CREATININE-BSD FMLA CKD-EPI: >60 MLS/MIN/1.73/M2
GLOBULIN SER-MCNC: 3.2 GM/DL (ref 2.4–3.5)
GLUCOSE SERPL-MCNC: 101 MG/DL (ref 82–115)
HCT VFR BLD AUTO: 45.7 % (ref 37–47)
HGB BLD-MCNC: 14.8 G/DL (ref 12–16)
IMM GRANULOCYTES # BLD AUTO: 0 X10(3)/MCL (ref 0–0.04)
IMM GRANULOCYTES NFR BLD AUTO: 0 %
LYMPHOCYTES # BLD AUTO: 1.08 X10(3)/MCL (ref 0.6–4.6)
LYMPHOCYTES NFR BLD AUTO: 31.6 %
MCH RBC QN AUTO: 30.1 PG (ref 27–31)
MCHC RBC AUTO-ENTMCNC: 32.4 G/DL (ref 33–36)
MCV RBC AUTO: 93.1 FL (ref 80–94)
MONOCYTES # BLD AUTO: 0.35 X10(3)/MCL (ref 0.1–1.3)
MONOCYTES NFR BLD AUTO: 10.2 %
NEUTROPHILS # BLD AUTO: 1.76 X10(3)/MCL (ref 2.1–9.2)
NEUTROPHILS NFR BLD AUTO: 51.5 %
PLATELET # BLD AUTO: 164 X10(3)/MCL (ref 130–400)
PMV BLD AUTO: 8.9 FL (ref 7.4–10.4)
POTASSIUM SERPL-SCNC: 4.8 MMOL/L (ref 3.5–5.1)
PROT SERPL-MCNC: 7.1 GM/DL (ref 5.8–7.6)
RBC # BLD AUTO: 4.91 X10(6)/MCL (ref 4.2–5.4)
SODIUM SERPL-SCNC: 139 MMOL/L (ref 136–145)
TSH SERPL-ACNC: 1.97 UIU/ML (ref 0.35–4.94)
WBC # SPEC AUTO: 3.4 X10(3)/MCL (ref 4.5–11.5)

## 2023-04-11 PROCEDURE — 85025 COMPLETE CBC W/AUTO DIFF WBC: CPT

## 2023-04-11 PROCEDURE — 96413 CHEMO IV INFUSION 1 HR: CPT

## 2023-04-11 PROCEDURE — 36415 COLL VENOUS BLD VENIPUNCTURE: CPT

## 2023-04-11 PROCEDURE — 63600175 PHARM REV CODE 636 W HCPCS: Mod: JZ,JG | Performed by: INTERNAL MEDICINE

## 2023-04-11 PROCEDURE — 25000003 PHARM REV CODE 250: Performed by: INTERNAL MEDICINE

## 2023-04-11 PROCEDURE — 84443 ASSAY THYROID STIM HORMONE: CPT

## 2023-04-11 PROCEDURE — 80053 COMPREHEN METABOLIC PANEL: CPT

## 2023-04-11 RX ORDER — SODIUM CHLORIDE 0.9 % (FLUSH) 0.9 %
10 SYRINGE (ML) INJECTION
Status: DISCONTINUED | OUTPATIENT
Start: 2023-04-11 | End: 2023-04-11 | Stop reason: HOSPADM

## 2023-04-11 RX ORDER — HEPARIN 100 UNIT/ML
500 SYRINGE INTRAVENOUS
Status: DISCONTINUED | OUTPATIENT
Start: 2023-04-11 | End: 2023-04-11 | Stop reason: HOSPADM

## 2023-04-11 RX ADMIN — SODIUM CHLORIDE: 9 INJECTION, SOLUTION INTRAVENOUS at 02:04

## 2023-04-11 RX ADMIN — SODIUM CHLORIDE 480 MG: 9 INJECTION, SOLUTION INTRAVENOUS at 02:04

## 2023-04-11 NOTE — PLAN OF CARE
Plan of care reviewed with patient; patient in agreement. Has copy of future appts; reviewed dates and times with patient.

## 2023-05-08 DIAGNOSIS — M25.50 ARTHRALGIA, UNSPECIFIED JOINT: Primary | ICD-10-CM

## 2023-05-08 RX ORDER — MELOXICAM 15 MG/1
15 TABLET ORAL DAILY
Qty: 30 TABLET | Refills: 2 | Status: SHIPPED | OUTPATIENT
Start: 2023-05-08

## 2023-05-08 RX ORDER — HEPARIN 100 UNIT/ML
500 SYRINGE INTRAVENOUS
Status: CANCELLED | OUTPATIENT
Start: 2023-05-09

## 2023-05-08 RX ORDER — SODIUM CHLORIDE 0.9 % (FLUSH) 0.9 %
10 SYRINGE (ML) INJECTION
Status: CANCELLED | OUTPATIENT
Start: 2023-05-09

## 2023-05-09 ENCOUNTER — INFUSION (OUTPATIENT)
Dept: INFUSION THERAPY | Facility: HOSPITAL | Age: 68
End: 2023-05-09
Attending: INTERNAL MEDICINE
Payer: MEDICARE

## 2023-05-09 VITALS
OXYGEN SATURATION: 99 % | DIASTOLIC BLOOD PRESSURE: 81 MMHG | BODY MASS INDEX: 24.01 KG/M2 | SYSTOLIC BLOOD PRESSURE: 138 MMHG | HEART RATE: 79 BPM | TEMPERATURE: 99 F | WEIGHT: 151 LBS | RESPIRATION RATE: 16 BRPM

## 2023-05-09 DIAGNOSIS — C22.0 HCC (HEPATOCELLULAR CARCINOMA): Primary | ICD-10-CM

## 2023-05-09 PROCEDURE — 63600175 PHARM REV CODE 636 W HCPCS: Mod: JZ,JG | Performed by: NURSE PRACTITIONER

## 2023-05-09 PROCEDURE — 96413 CHEMO IV INFUSION 1 HR: CPT

## 2023-05-09 PROCEDURE — 25000003 PHARM REV CODE 250: Performed by: NURSE PRACTITIONER

## 2023-05-09 RX ORDER — SODIUM CHLORIDE 0.9 % (FLUSH) 0.9 %
10 SYRINGE (ML) INJECTION
Status: DISCONTINUED | OUTPATIENT
Start: 2023-05-09 | End: 2023-05-09 | Stop reason: HOSPADM

## 2023-05-09 RX ORDER — HEPARIN 100 UNIT/ML
500 SYRINGE INTRAVENOUS
Status: DISCONTINUED | OUTPATIENT
Start: 2023-05-09 | End: 2023-05-09 | Stop reason: HOSPADM

## 2023-05-09 RX ADMIN — SODIUM CHLORIDE: 9 INJECTION, SOLUTION INTRAVENOUS at 02:05

## 2023-05-09 RX ADMIN — SODIUM CHLORIDE 480 MG: 9 INJECTION, SOLUTION INTRAVENOUS at 02:05

## 2023-05-09 NOTE — PLAN OF CARE
Pt states understanding to report any signs or symptoms of infection including fever >100.4, urinary symptoms, etc.  To her care team.

## 2023-05-09 NOTE — NURSING
Pt tolerated treatment with no complaints. IV catheter discontinued intact. Site without signs and symptoms of complications. Dressing and pressure applied.  No complaints.

## 2023-06-05 RX ORDER — HEPARIN 100 UNIT/ML
500 SYRINGE INTRAVENOUS
Status: CANCELLED | OUTPATIENT
Start: 2023-06-06

## 2023-06-05 RX ORDER — SODIUM CHLORIDE 0.9 % (FLUSH) 0.9 %
10 SYRINGE (ML) INJECTION
Status: CANCELLED | OUTPATIENT
Start: 2023-06-06

## 2023-06-06 ENCOUNTER — LAB VISIT (OUTPATIENT)
Dept: LAB | Facility: HOSPITAL | Age: 68
End: 2023-06-06
Attending: INTERNAL MEDICINE
Payer: MEDICARE

## 2023-06-06 ENCOUNTER — INFUSION (OUTPATIENT)
Dept: INFUSION THERAPY | Facility: HOSPITAL | Age: 68
End: 2023-06-06
Attending: INTERNAL MEDICINE
Payer: MEDICARE

## 2023-06-06 VITALS
DIASTOLIC BLOOD PRESSURE: 75 MMHG | BODY MASS INDEX: 24.43 KG/M2 | TEMPERATURE: 99 F | OXYGEN SATURATION: 98 % | WEIGHT: 152 LBS | HEIGHT: 66 IN | RESPIRATION RATE: 16 BRPM | HEART RATE: 71 BPM | SYSTOLIC BLOOD PRESSURE: 112 MMHG

## 2023-06-06 DIAGNOSIS — C22.0 HCC (HEPATOCELLULAR CARCINOMA): Primary | ICD-10-CM

## 2023-06-06 DIAGNOSIS — C50.111 MALIGNANT NEOPLASM OF CENTRAL PORTION OF RIGHT BREAST IN FEMALE, ESTROGEN RECEPTOR POSITIVE: ICD-10-CM

## 2023-06-06 DIAGNOSIS — C22.0: ICD-10-CM

## 2023-06-06 DIAGNOSIS — Z13.29 SCREENING FOR THYROID DISORDER: ICD-10-CM

## 2023-06-06 DIAGNOSIS — Z17.0 MALIGNANT NEOPLASM OF CENTRAL PORTION OF RIGHT BREAST IN FEMALE, ESTROGEN RECEPTOR POSITIVE: ICD-10-CM

## 2023-06-06 DIAGNOSIS — E03.2 HYPOTHYROIDISM DUE TO DRUGS: ICD-10-CM

## 2023-06-06 DIAGNOSIS — Z15.09 BRCA2 GENE MUTATION POSITIVE: ICD-10-CM

## 2023-06-06 DIAGNOSIS — Z15.01 BRCA2 GENE MUTATION POSITIVE: ICD-10-CM

## 2023-06-06 LAB
ALBUMIN SERPL-MCNC: 3.9 G/DL (ref 3.4–4.8)
ALBUMIN/GLOB SERPL: 1.2 RATIO (ref 1.1–2)
ALP SERPL-CCNC: 58 UNIT/L (ref 40–150)
ALT SERPL-CCNC: 18 UNIT/L (ref 0–55)
AST SERPL-CCNC: 19 UNIT/L (ref 5–34)
BASOPHILS # BLD AUTO: 0.03 X10(3)/MCL
BASOPHILS NFR BLD AUTO: 0.7 %
BILIRUBIN DIRECT+TOT PNL SERPL-MCNC: 0.7 MG/DL
BUN SERPL-MCNC: 18.7 MG/DL (ref 9.8–20.1)
CALCIUM SERPL-MCNC: 9.4 MG/DL (ref 8.4–10.2)
CHLORIDE SERPL-SCNC: 104 MMOL/L (ref 98–107)
CO2 SERPL-SCNC: 28 MMOL/L (ref 23–31)
CREAT SERPL-MCNC: 0.89 MG/DL (ref 0.55–1.02)
EOSINOPHIL # BLD AUTO: 0.54 X10(3)/MCL (ref 0–0.9)
EOSINOPHIL NFR BLD AUTO: 12.4 %
ERYTHROCYTE [DISTWIDTH] IN BLOOD BY AUTOMATED COUNT: 12.2 % (ref 11.5–17)
GFR SERPLBLD CREATININE-BSD FMLA CKD-EPI: >60 MLS/MIN/1.73/M2
GLOBULIN SER-MCNC: 3.2 GM/DL (ref 2.4–3.5)
GLUCOSE SERPL-MCNC: 102 MG/DL (ref 82–115)
HCT VFR BLD AUTO: 43.7 % (ref 37–47)
HGB BLD-MCNC: 14 G/DL (ref 12–16)
IMM GRANULOCYTES # BLD AUTO: 0.01 X10(3)/MCL (ref 0–0.04)
IMM GRANULOCYTES NFR BLD AUTO: 0.2 %
LYMPHOCYTES # BLD AUTO: 1.03 X10(3)/MCL (ref 0.6–4.6)
LYMPHOCYTES NFR BLD AUTO: 23.7 %
MCH RBC QN AUTO: 30.3 PG (ref 27–31)
MCHC RBC AUTO-ENTMCNC: 32 G/DL (ref 33–36)
MCV RBC AUTO: 94.6 FL (ref 80–94)
MONOCYTES # BLD AUTO: 0.33 X10(3)/MCL (ref 0.1–1.3)
MONOCYTES NFR BLD AUTO: 7.6 %
NEUTROPHILS # BLD AUTO: 2.4 X10(3)/MCL (ref 2.1–9.2)
NEUTROPHILS NFR BLD AUTO: 55.4 %
PLATELET # BLD AUTO: 166 X10(3)/MCL (ref 130–400)
PMV BLD AUTO: 8.9 FL (ref 7.4–10.4)
POTASSIUM SERPL-SCNC: 4.6 MMOL/L (ref 3.5–5.1)
PROT SERPL-MCNC: 7.1 GM/DL (ref 5.8–7.6)
RBC # BLD AUTO: 4.62 X10(6)/MCL (ref 4.2–5.4)
SODIUM SERPL-SCNC: 140 MMOL/L (ref 136–145)
TSH SERPL-ACNC: 1.57 UIU/ML (ref 0.35–4.94)
WBC # SPEC AUTO: 4.34 X10(3)/MCL (ref 4.5–11.5)

## 2023-06-06 PROCEDURE — 25000003 PHARM REV CODE 250: Performed by: INTERNAL MEDICINE

## 2023-06-06 PROCEDURE — 80053 COMPREHEN METABOLIC PANEL: CPT

## 2023-06-06 PROCEDURE — 96413 CHEMO IV INFUSION 1 HR: CPT

## 2023-06-06 PROCEDURE — 84443 ASSAY THYROID STIM HORMONE: CPT

## 2023-06-06 PROCEDURE — 36415 COLL VENOUS BLD VENIPUNCTURE: CPT

## 2023-06-06 PROCEDURE — 85025 COMPLETE CBC W/AUTO DIFF WBC: CPT

## 2023-06-06 PROCEDURE — 63600175 PHARM REV CODE 636 W HCPCS: Mod: JZ,JG | Performed by: INTERNAL MEDICINE

## 2023-06-06 RX ORDER — HEPARIN 100 UNIT/ML
500 SYRINGE INTRAVENOUS
Status: DISCONTINUED | OUTPATIENT
Start: 2023-06-06 | End: 2023-06-06 | Stop reason: HOSPADM

## 2023-06-06 RX ORDER — SODIUM CHLORIDE 0.9 % (FLUSH) 0.9 %
10 SYRINGE (ML) INJECTION
Status: DISCONTINUED | OUTPATIENT
Start: 2023-06-06 | End: 2023-06-06 | Stop reason: HOSPADM

## 2023-06-06 RX ADMIN — SODIUM CHLORIDE 480 MG: 9 INJECTION, SOLUTION INTRAVENOUS at 02:06

## 2023-06-06 RX ADMIN — SODIUM CHLORIDE: 9 INJECTION, SOLUTION INTRAVENOUS at 02:06

## 2023-06-20 ENCOUNTER — HOSPITAL ENCOUNTER (OUTPATIENT)
Dept: RADIOLOGY | Facility: HOSPITAL | Age: 68
Discharge: HOME | End: 2023-06-20
Attending: INTERNAL MEDICINE
Payer: MEDICARE

## 2023-06-20 DIAGNOSIS — Z15.09 BRCA2 GENE MUTATION POSITIVE: ICD-10-CM

## 2023-06-20 DIAGNOSIS — C22.0 HEPATOCELLULAR CARCINOMA: ICD-10-CM

## 2023-06-20 DIAGNOSIS — Z15.01 BRCA2 GENE MUTATION POSITIVE: ICD-10-CM

## 2023-06-20 DIAGNOSIS — Z17.0 MALIGNANT NEOPLASM OF CENTRAL PORTION OF RIGHT BREAST IN FEMALE, ESTROGEN RECEPTOR POSITIVE: ICD-10-CM

## 2023-06-20 DIAGNOSIS — C50.111 MALIGNANT NEOPLASM OF CENTRAL PORTION OF RIGHT BREAST IN FEMALE, ESTROGEN RECEPTOR POSITIVE: ICD-10-CM

## 2023-06-20 PROCEDURE — 74177 CT ABD & PELVIS W/CONTRAST: CPT | Mod: TC

## 2023-06-20 PROCEDURE — 25500020 PHARM REV CODE 255: Performed by: INTERNAL MEDICINE

## 2023-06-20 PROCEDURE — 71260 CT THORAX DX C+: CPT | Mod: TC

## 2023-06-20 RX ADMIN — DIATRIZOATE MEGLUMINE AND DIATRIZOATE SODIUM 30 ML: 660; 100 LIQUID ORAL; RECTAL at 08:06

## 2023-06-20 RX ADMIN — IOPAMIDOL 100 ML: 755 INJECTION, SOLUTION INTRAVENOUS at 09:06

## 2023-06-25 NOTE — PROGRESS NOTES
Subjective:       Patient ID: Maci Lewis is a 67 y.o. female.    Chief Complaint:  No problems    Diagnosis: Recurrent multifocal hepatocellular carcinoma                    Stage IIA right breast cancer 10/13 (T2 N0 M0), 2.4 cm, grade III, ER/CO neg, Her-2 neg                    Positive BRCA2 mutation                    S/p bilateral mastectomies and BSO                     + COVID-19 vaccinated    Treatment History  AC x4 (2/14) -->weekly Taxol x12 (6//14) ---> XRT x 14 days ---> bilateral mastectomies  Partial right hepatectomy 7/16  Right hepatic segmentectomy 11/17  Y-90 RHA 6/19/18; Y-90 LHA 8/14/18  Nexavar 8/2-8/16/18    Current Treatment: Nivolumab Q 4 weeks (started 10/25/18)    Clinical History:  Patient underwent right breast lumpectomy and SLND 10/28/13. Final pathology showed a 2.5 cm grade 3 infiltrating ductal carcinoma with clear margins, no lymphovascular invasion and 6 negative LN's.  She completed adjuvant chemotherapy followed by radiation as outlined above.  Genetic testing was positive for a BRCA2 gene mutation.  She underwent bilateral mastectomies and ROCIO reconstruction 12/8/14    She presented to the hospital 7/24/16 with acute right neck and shoulder pain, followed by abdominal pain and distention.  She was anemic and hypotensive. CT of the abdomen showed 2 complex heterogeneous masses along the inferior aspect of the right hepatic lobe.  The larger more peripheral mass showed evidence of active hemorrhage.  There was a large subcapsular hematoma with free fluid in the pelvis.  Attempted embolization by interventional radiology was unsuccessful.  She was taken to the OR for exploratory laparotomy.  A large intra-abdominal blood collection was evacuated.  There was tumor in hepatic segment 6 with necrosis and rupture with some minimal oozing but no active bleeding.  Partial right hepatectomy was performed.  Intraoperative ultrasound of the liver showed no other suspicious  lesions.  Alpha-fetoprotein level was normal.  Final pathology showed a 5.3 cm intermediate grade hepatocellular carcinoma with focal hemorrhagic infarction, fibrosis and hepatic capsular rupture.  Margins of resection were clear. CT A/P 10/14/16 showed postoperative changes in the lateral inferior margin of the right hepatic lobe and a small fluid collection. There was no abnormal contrast enhancement or evidence of suspicious liver lesions. Repeat scans 2/14/17 showed stable postoperative changes with improved small perihepatic fluid collection.    CT of the abdomen and pelvis 7/24/17 showed postsurgical changes along the inferior right hepatic lobe with a new area of hypoattenuation inferiorly along the suture line measuring up to about 12 mm. This finding was not associated with contrast enhancement to suggest recurrence. Short-term interval follow-up was recommended in 3 months by Radiology. There were small stable hepatic cysts. Follow-up CT scan 10/25/17 showed significant increase of the hypodense lesion adjacent to the suture line in the right hepatic lobe to 4.7 x 4.2 cm. There was minimal low-density change within the lesion suggesting central necrosis and subtle contrast enhancement. Alpha-fetoprotein level was normal. CT scan of the chest 11/3/17 showed no evidence of metastatic disease. There was a small hypodense nodule in the right thyroid. She was referred to surgical oncology and underwent resection of segment 6 of the liver 11/24/17 along with cholecystectomy. Final pathology showed a moderately differentiated hepatocellular carcinoma measuring 6.7 cm in greatest dimension. There was no evidence of vascular or perineural invasion. All resection margins were clear.    Surveillance CT scan of the abdomen and pelvis 5/25/18 showed significant progression and recurrence of her disease with a dominant mass in segment 5 of the right hepatic lobe measuring 6.7 cm and a new mass in the right lobe  measuring 3.7 cm in segment 7 and also in segment 8 measuring 1.6 cm.  A new lesion in the left lobe of the liver measured 4.2 cm.  Alpha-fetoprotein level was normal at 4.5 ng/mL.  CT-guided biopsy 6/5/18 confirmed hepatocellular carcinoma, well-differentiated.     She underwent Y-90 radioembolization to the right hepatic artery to a total dose of 335 mGy 6/19/18.  She was evaluated at [facility] 6/20/18 by Dr. Gigi Perkins for consideration of liver transplant.  Based on her disease burden, she did not meet criteria for liver transplantation.  Recommendations were to attempt to downstage her tumor burden through radioembolization and potentially revisit transplantation in the future depending on her response.  Her case was presented at interdisciplinary conference, and these recommendations were supported, along with addition of Nexavar for systemic treatment as tolerated.  Nexavar was initiated 8/2/18. She underwent Y-90 radioembolization of the left hepatic artery 8/14/18 to a total dose of 256 mGy.  Nexavar was discontinued secondary to significant toxicity including a severe rash, alopecia, anemia and transaminitis.    She was seen at [facility] 10/18/18 by Dr. Christopher for a second treatment opinion. She was not eligible for any current clinical trials at their institution. Her pathology was requested and submitted for next generation sequencing to determine if she had any targetable mutations. We discussed further treatment and both Dr. Christopher and I agreed that her best option for further therapy would be treatment with Nivolumab given the uncharacteristic nature and behavior of her multifocal hepatocellular carcinoma.  Treatment was initiated 10/25/18.    Restaging CT of the abdomen 1/15/19 after 3 months of therapy showed significant disease regression.  The reference left hepatic lobe mass measured 4.8 x 2.0 cm (previously 10.1 x 6.9 cm), mass in segment IV was 4.3 x 2.0 cm (previous 7.6 x 7.3 cm) and a posterior  right hepatic lobe mass was 2.7 x 2.1 cm (previous 5.2 x 4.3 cm). Her transaminases completely normalized and her anemia resolved. CT scans of the chest, abdomen and pelvis 4/4/19 showed continued decrease in size of the multiple hepatic lesions. Representative left hepatic lobe mass measured 3.6 cm (previous 4.8 cm). There were no new lesions. Spleen was unremarkable. There was a 2 mm noncalcified nodule in the left upper lobe. There was no suspicious mediastinal or hilar adenopathy. CT scans of the chest, abdomen and pelvis 4/2/19 showed continued interval decrease of the hepatic metastatic lesions and a 2 mm noncalcified nodule in the left upper lobe with no new sites of disease. Serial CT scans have shown no significant disease progression.     Interval History  She returns to day by herself for a four-month follow-up visit.  She remains on nivolumab laboratory testing has remained stable.  She has had no signs or symptoms suspicious for immune toxicity.  She has had intermittent, minor gum irritation that she noted to her dentist.  No abdominal symptoms or complaints.  Appetite is excellent, no weight loss.  CT scans of the chest, abdomen and pelvis showed stable pericapsular calcifications in the right and left were.  There were no appreciable masses or findings suspicious for disease recurrence.      Review of Systems   Constitutional:  Negative for appetite change, fatigue, fever and unexpected weight change.   HENT:  Negative for mouth sores, sore throat and trouble swallowing.    Eyes: Negative.    Respiratory:  Negative for cough and shortness of breath.    Cardiovascular:  Negative for chest pain, palpitations and leg swelling.   Gastrointestinal:  Negative for abdominal distention, abdominal pain, change in bowel habit, constipation, diarrhea, nausea, vomiting and change in bowel habit.   Genitourinary:  Negative for dysuria, frequency and urgency.   Musculoskeletal:  Positive for arthralgias (Mild).  "Negative for back pain.   Integumentary:  Negative for rash and mole/lesion.   Neurological:  Negative for dizziness, weakness, numbness and headaches.   Hematological:  Negative for adenopathy. Does not bruise/bleed easily.   Psychiatric/Behavioral: Negative.         PMHx:  Arthritis, breast cancer  PSHx:  Tonsils, cholecystectomy, left foot surgery, hysterectomy/BSO, right breast lumpectomy, bilateral mastectomies with reconstruction, HCC resection x2, Y-90 embolization  SH:  Lifetime nonsmoker, occasional social alcohol use. Lives in [place] with her , retired .  FH:  Father, paternal uncle and maternal grandfather had kidney cancer. A great aunt had breast cancer.     Objective:        /84   Pulse 78   Resp 16   Ht 5' 5.35" (1.66 m)   Wt 70.8 kg (156 lb)   BMI 25.68 kg/m²    Physical Exam  Constitutional:       Comments: Well-developed white female in NAD   HENT:      Head: Normocephalic.      Mouth/Throat:      Mouth: Mucous membranes are moist.      Pharynx: Oropharynx is clear. No posterior oropharyngeal erythema.   Eyes:      Extraocular Movements: Extraocular movements intact.      Conjunctiva/sclera: Conjunctivae normal.      Pupils: Pupils are equal, round, and reactive to light.   Cardiovascular:      Rate and Rhythm: Normal rate and regular rhythm.      Heart sounds: No murmur heard.  Pulmonary:      Comments: Lungs clear to auscultation  Chest:      Comments: Bilateral mastectomies with ROCIO reconstruction.  No palpable masses, skin changes or axillary nodes bilaterally.  Abdominal:      General: Bowel sounds are normal. There is no distension.      Palpations: Abdomen is soft.      Tenderness: There is no abdominal tenderness.      Comments: Well-healed RUQ incision.  No palpable masses or organomegaly.   Musculoskeletal:         General: No swelling or tenderness. Normal range of motion.      Cervical back: Neck supple. No tenderness.   Skin:     General: Skin is " warm and dry.      Findings: No rash.   Neurological:      General: No focal deficit present.      Mental Status: She is oriented to person, place, and time.      Cranial Nerves: No cranial nerve deficit.      Motor: No weakness.     ECOG SCORE    0 - Fully active-able to carry on all pre-disease performance without restriction          LABORATORY  No results found for this or any previous visit (from the past 168 hour(s)).    Laboratory from 6/20/23 reviewed and unremarkable.    Assessment:   Recurrent multifocal hepatocellular carcinoma  Stage IIA triple negative breast cancer 10/13 - KASSI  Positive BRCA2 mutation      Plan:   Patient has no clinical or radiographic evidence of disease recurrence.  Continue treatment with nivolumab at full dose every 4 weeks, tolerating well.  Continue laboratory monitoring every 8 weeks.  RTC in 4 months for a follow-up visit and clinical exam.  Barring any problems, will repeat imaging studies in 1 year.      PHAM WOLF MD    Other Physicians  Dr. Arthur Patel ()  Dr. Richard Perera

## 2023-06-30 RX ORDER — LIFITEGRAST 50 MG/ML
1 SOLUTION/ DROPS OPHTHALMIC 2 TIMES DAILY
COMMUNITY
Start: 2023-03-31

## 2023-07-03 ENCOUNTER — OFFICE VISIT (OUTPATIENT)
Dept: HEMATOLOGY/ONCOLOGY | Facility: CLINIC | Age: 68
End: 2023-07-03
Payer: MEDICARE

## 2023-07-03 VITALS
RESPIRATION RATE: 16 BRPM | WEIGHT: 156 LBS | SYSTOLIC BLOOD PRESSURE: 128 MMHG | HEIGHT: 65 IN | DIASTOLIC BLOOD PRESSURE: 84 MMHG | BODY MASS INDEX: 25.99 KG/M2 | HEART RATE: 78 BPM

## 2023-07-03 DIAGNOSIS — C50.111 MALIGNANT NEOPLASM OF CENTRAL PORTION OF RIGHT BREAST IN FEMALE, ESTROGEN RECEPTOR POSITIVE: ICD-10-CM

## 2023-07-03 DIAGNOSIS — E03.2 HYPOTHYROIDISM DUE TO DRUGS: ICD-10-CM

## 2023-07-03 DIAGNOSIS — Z15.09 BRCA2 GENE MUTATION POSITIVE: ICD-10-CM

## 2023-07-03 DIAGNOSIS — C22.0 HEPATOCELLULAR CARCINOMA: Primary | ICD-10-CM

## 2023-07-03 DIAGNOSIS — Z15.01 BRCA2 GENE MUTATION POSITIVE: ICD-10-CM

## 2023-07-03 DIAGNOSIS — Z17.0 MALIGNANT NEOPLASM OF CENTRAL PORTION OF RIGHT BREAST IN FEMALE, ESTROGEN RECEPTOR POSITIVE: ICD-10-CM

## 2023-07-03 PROCEDURE — 99214 OFFICE O/P EST MOD 30 MIN: CPT | Mod: S$PBB,,, | Performed by: INTERNAL MEDICINE

## 2023-07-03 PROCEDURE — 99213 OFFICE O/P EST LOW 20 MIN: CPT | Mod: PBBFAC | Performed by: INTERNAL MEDICINE

## 2023-07-03 PROCEDURE — 99214 PR OFFICE/OUTPT VISIT, EST, LEVL IV, 30-39 MIN: ICD-10-PCS | Mod: S$PBB,,, | Performed by: INTERNAL MEDICINE

## 2023-07-03 PROCEDURE — 99999 PR PBB SHADOW E&M-EST. PATIENT-LVL III: CPT | Mod: PBBFAC,,, | Performed by: INTERNAL MEDICINE

## 2023-07-03 PROCEDURE — 99999 PR PBB SHADOW E&M-EST. PATIENT-LVL III: ICD-10-PCS | Mod: PBBFAC,,, | Performed by: INTERNAL MEDICINE

## 2023-07-03 RX ORDER — HEPARIN 100 UNIT/ML
500 SYRINGE INTRAVENOUS
Status: CANCELLED | OUTPATIENT
Start: 2023-07-04

## 2023-07-03 RX ORDER — SODIUM CHLORIDE 0.9 % (FLUSH) 0.9 %
10 SYRINGE (ML) INJECTION
Status: CANCELLED | OUTPATIENT
Start: 2023-07-04

## 2023-07-05 ENCOUNTER — INFUSION (OUTPATIENT)
Dept: INFUSION THERAPY | Facility: HOSPITAL | Age: 68
End: 2023-07-05
Attending: INTERNAL MEDICINE
Payer: MEDICARE

## 2023-07-05 VITALS — SYSTOLIC BLOOD PRESSURE: 132 MMHG | DIASTOLIC BLOOD PRESSURE: 89 MMHG | HEART RATE: 80 BPM | OXYGEN SATURATION: 98 %

## 2023-07-05 DIAGNOSIS — C22.0 HCC (HEPATOCELLULAR CARCINOMA): Primary | ICD-10-CM

## 2023-07-05 PROCEDURE — 63600175 PHARM REV CODE 636 W HCPCS: Mod: JZ,JG | Performed by: INTERNAL MEDICINE

## 2023-07-05 PROCEDURE — 25000003 PHARM REV CODE 250: Performed by: INTERNAL MEDICINE

## 2023-07-05 PROCEDURE — 96413 CHEMO IV INFUSION 1 HR: CPT

## 2023-07-05 RX ORDER — HEPARIN 100 UNIT/ML
500 SYRINGE INTRAVENOUS
Status: DISCONTINUED | OUTPATIENT
Start: 2023-07-05 | End: 2023-07-05 | Stop reason: HOSPADM

## 2023-07-05 RX ORDER — SODIUM CHLORIDE 0.9 % (FLUSH) 0.9 %
10 SYRINGE (ML) INJECTION
Status: DISCONTINUED | OUTPATIENT
Start: 2023-07-05 | End: 2023-07-05 | Stop reason: HOSPADM

## 2023-07-05 RX ADMIN — SODIUM CHLORIDE 480 MG: 9 INJECTION, SOLUTION INTRAVENOUS at 02:07

## 2023-07-27 RX ORDER — HEPARIN 100 UNIT/ML
500 SYRINGE INTRAVENOUS
Status: CANCELLED | OUTPATIENT
Start: 2023-08-01

## 2023-07-27 RX ORDER — SODIUM CHLORIDE 0.9 % (FLUSH) 0.9 %
10 SYRINGE (ML) INJECTION
Status: CANCELLED | OUTPATIENT
Start: 2023-08-01

## 2023-08-01 ENCOUNTER — LAB VISIT (OUTPATIENT)
Dept: LAB | Facility: HOSPITAL | Age: 68
End: 2023-08-01
Attending: INTERNAL MEDICINE
Payer: MEDICARE

## 2023-08-01 ENCOUNTER — INFUSION (OUTPATIENT)
Dept: INFUSION THERAPY | Facility: HOSPITAL | Age: 68
End: 2023-08-01
Attending: INTERNAL MEDICINE
Payer: MEDICARE

## 2023-08-01 VITALS
SYSTOLIC BLOOD PRESSURE: 130 MMHG | OXYGEN SATURATION: 96 % | WEIGHT: 155.88 LBS | TEMPERATURE: 98 F | RESPIRATION RATE: 18 BRPM | HEIGHT: 65 IN | HEART RATE: 70 BPM | DIASTOLIC BLOOD PRESSURE: 89 MMHG | BODY MASS INDEX: 25.97 KG/M2

## 2023-08-01 DIAGNOSIS — Z17.0 MALIGNANT NEOPLASM OF CENTRAL PORTION OF RIGHT BREAST IN FEMALE, ESTROGEN RECEPTOR POSITIVE: ICD-10-CM

## 2023-08-01 DIAGNOSIS — C22.0 HCC (HEPATOCELLULAR CARCINOMA): Primary | ICD-10-CM

## 2023-08-01 DIAGNOSIS — E03.2 HYPOTHYROIDISM DUE TO DRUGS: ICD-10-CM

## 2023-08-01 DIAGNOSIS — C50.111 MALIGNANT NEOPLASM OF CENTRAL PORTION OF RIGHT BREAST IN FEMALE, ESTROGEN RECEPTOR POSITIVE: ICD-10-CM

## 2023-08-01 DIAGNOSIS — Z15.09 BRCA2 GENE MUTATION POSITIVE: ICD-10-CM

## 2023-08-01 DIAGNOSIS — C22.0 HEPATOCELLULAR CARCINOMA: ICD-10-CM

## 2023-08-01 DIAGNOSIS — Z15.01 BRCA2 GENE MUTATION POSITIVE: ICD-10-CM

## 2023-08-01 LAB
ALBUMIN SERPL-MCNC: 4 G/DL (ref 3.4–4.8)
ALBUMIN/GLOB SERPL: 1.3 RATIO (ref 1.1–2)
ALP SERPL-CCNC: 63 UNIT/L (ref 40–150)
ALT SERPL-CCNC: 18 UNIT/L (ref 0–55)
AST SERPL-CCNC: 19 UNIT/L (ref 5–34)
BASOPHILS # BLD AUTO: 0.03 X10(3)/MCL
BASOPHILS NFR BLD AUTO: 0.9 %
BILIRUBIN DIRECT+TOT PNL SERPL-MCNC: 0.9 MG/DL
BUN SERPL-MCNC: 17.2 MG/DL (ref 9.8–20.1)
CALCIUM SERPL-MCNC: 9.7 MG/DL (ref 8.4–10.2)
CHLORIDE SERPL-SCNC: 105 MMOL/L (ref 98–107)
CO2 SERPL-SCNC: 25 MMOL/L (ref 23–31)
CREAT SERPL-MCNC: 0.88 MG/DL (ref 0.55–1.02)
EOSINOPHIL # BLD AUTO: 0.25 X10(3)/MCL (ref 0–0.9)
EOSINOPHIL NFR BLD AUTO: 7.1 %
ERYTHROCYTE [DISTWIDTH] IN BLOOD BY AUTOMATED COUNT: 12.2 % (ref 11.5–17)
GFR SERPLBLD CREATININE-BSD FMLA CKD-EPI: >60 MLS/MIN/1.73/M2
GLOBULIN SER-MCNC: 3.2 GM/DL (ref 2.4–3.5)
GLUCOSE SERPL-MCNC: 103 MG/DL (ref 82–115)
HCT VFR BLD AUTO: 44.4 % (ref 37–47)
HGB BLD-MCNC: 14.5 G/DL (ref 12–16)
IMM GRANULOCYTES # BLD AUTO: 0 X10(3)/MCL (ref 0–0.04)
IMM GRANULOCYTES NFR BLD AUTO: 0 %
LYMPHOCYTES # BLD AUTO: 1 X10(3)/MCL (ref 0.6–4.6)
LYMPHOCYTES NFR BLD AUTO: 28.6 %
MCH RBC QN AUTO: 30.7 PG (ref 27–31)
MCHC RBC AUTO-ENTMCNC: 32.7 G/DL (ref 33–36)
MCV RBC AUTO: 93.9 FL (ref 80–94)
MONOCYTES # BLD AUTO: 0.37 X10(3)/MCL (ref 0.1–1.3)
MONOCYTES NFR BLD AUTO: 10.6 %
NEUTROPHILS # BLD AUTO: 1.85 X10(3)/MCL (ref 2.1–9.2)
NEUTROPHILS NFR BLD AUTO: 52.8 %
PLATELET # BLD AUTO: 171 X10(3)/MCL (ref 130–400)
PMV BLD AUTO: 8.7 FL (ref 7.4–10.4)
POTASSIUM SERPL-SCNC: 4.4 MMOL/L (ref 3.5–5.1)
PROT SERPL-MCNC: 7.2 GM/DL (ref 5.8–7.6)
RBC # BLD AUTO: 4.73 X10(6)/MCL (ref 4.2–5.4)
SODIUM SERPL-SCNC: 138 MMOL/L (ref 136–145)
TSH SERPL-ACNC: 1.62 UIU/ML (ref 0.35–4.94)
WBC # SPEC AUTO: 3.5 X10(3)/MCL (ref 4.5–11.5)

## 2023-08-01 PROCEDURE — 84443 ASSAY THYROID STIM HORMONE: CPT

## 2023-08-01 PROCEDURE — 25000003 PHARM REV CODE 250: Performed by: INTERNAL MEDICINE

## 2023-08-01 PROCEDURE — 96413 CHEMO IV INFUSION 1 HR: CPT

## 2023-08-01 PROCEDURE — 85025 COMPLETE CBC W/AUTO DIFF WBC: CPT

## 2023-08-01 PROCEDURE — 36415 COLL VENOUS BLD VENIPUNCTURE: CPT

## 2023-08-01 PROCEDURE — 80053 COMPREHEN METABOLIC PANEL: CPT

## 2023-08-01 PROCEDURE — 63600175 PHARM REV CODE 636 W HCPCS: Mod: JZ,JG | Performed by: INTERNAL MEDICINE

## 2023-08-01 RX ORDER — SODIUM CHLORIDE 0.9 % (FLUSH) 0.9 %
10 SYRINGE (ML) INJECTION
Status: DISCONTINUED | OUTPATIENT
Start: 2023-08-01 | End: 2023-08-01 | Stop reason: HOSPADM

## 2023-08-01 RX ORDER — HEPARIN 100 UNIT/ML
500 SYRINGE INTRAVENOUS
Status: DISCONTINUED | OUTPATIENT
Start: 2023-08-01 | End: 2023-08-01 | Stop reason: HOSPADM

## 2023-08-01 RX ADMIN — SODIUM CHLORIDE: 9 INJECTION, SOLUTION INTRAVENOUS at 03:08

## 2023-08-01 RX ADMIN — SODIUM CHLORIDE 480 MG: 9 INJECTION, SOLUTION INTRAVENOUS at 03:08

## 2023-08-26 RX ORDER — SODIUM CHLORIDE 0.9 % (FLUSH) 0.9 %
10 SYRINGE (ML) INJECTION
Status: CANCELLED | OUTPATIENT
Start: 2023-08-29

## 2023-08-26 RX ORDER — HEPARIN 100 UNIT/ML
500 SYRINGE INTRAVENOUS
Status: CANCELLED | OUTPATIENT
Start: 2023-08-29

## 2023-08-29 ENCOUNTER — INFUSION (OUTPATIENT)
Dept: INFUSION THERAPY | Facility: HOSPITAL | Age: 68
End: 2023-08-29
Attending: INTERNAL MEDICINE
Payer: MEDICARE

## 2023-08-29 VITALS
OXYGEN SATURATION: 99 % | TEMPERATURE: 98 F | WEIGHT: 150 LBS | HEART RATE: 66 BPM | HEIGHT: 66 IN | BODY MASS INDEX: 24.11 KG/M2 | DIASTOLIC BLOOD PRESSURE: 87 MMHG | SYSTOLIC BLOOD PRESSURE: 133 MMHG

## 2023-08-29 DIAGNOSIS — C22.0 HCC (HEPATOCELLULAR CARCINOMA): Primary | ICD-10-CM

## 2023-08-29 PROCEDURE — 96413 CHEMO IV INFUSION 1 HR: CPT

## 2023-08-29 PROCEDURE — 25000003 PHARM REV CODE 250: Performed by: INTERNAL MEDICINE

## 2023-08-29 PROCEDURE — 63600175 PHARM REV CODE 636 W HCPCS: Mod: JZ,JG | Performed by: INTERNAL MEDICINE

## 2023-08-29 RX ORDER — SODIUM CHLORIDE 0.9 % (FLUSH) 0.9 %
10 SYRINGE (ML) INJECTION
Status: DISCONTINUED | OUTPATIENT
Start: 2023-08-29 | End: 2023-08-29 | Stop reason: HOSPADM

## 2023-08-29 RX ORDER — HEPARIN 100 UNIT/ML
500 SYRINGE INTRAVENOUS
Status: DISCONTINUED | OUTPATIENT
Start: 2023-08-29 | End: 2023-08-29 | Stop reason: HOSPADM

## 2023-08-29 RX ADMIN — SODIUM CHLORIDE 480 MG: 9 INJECTION, SOLUTION INTRAVENOUS at 03:08

## 2023-09-15 ENCOUNTER — PATIENT MESSAGE (OUTPATIENT)
Dept: HEMATOLOGY/ONCOLOGY | Facility: CLINIC | Age: 68
End: 2023-09-15
Payer: MEDICARE

## 2023-09-15 DIAGNOSIS — K21.9 GASTRIC REFLUX: Primary | ICD-10-CM

## 2023-09-15 RX ORDER — OMEPRAZOLE 20 MG/1
20 CAPSULE, DELAYED RELEASE ORAL DAILY
Qty: 30 CAPSULE | Refills: 3 | Status: SHIPPED | OUTPATIENT
Start: 2023-09-15 | End: 2023-10-16

## 2023-09-23 RX ORDER — HEPARIN 100 UNIT/ML
500 SYRINGE INTRAVENOUS
Status: CANCELLED | OUTPATIENT
Start: 2023-09-26

## 2023-09-23 RX ORDER — SODIUM CHLORIDE 0.9 % (FLUSH) 0.9 %
10 SYRINGE (ML) INJECTION
Status: CANCELLED | OUTPATIENT
Start: 2023-09-26

## 2023-09-26 ENCOUNTER — INFUSION (OUTPATIENT)
Dept: INFUSION THERAPY | Facility: HOSPITAL | Age: 68
End: 2023-09-26
Attending: INTERNAL MEDICINE
Payer: MEDICARE

## 2023-09-26 ENCOUNTER — LAB VISIT (OUTPATIENT)
Dept: LAB | Facility: HOSPITAL | Age: 68
End: 2023-09-26
Attending: INTERNAL MEDICINE
Payer: MEDICARE

## 2023-09-26 VITALS
HEIGHT: 66 IN | SYSTOLIC BLOOD PRESSURE: 112 MMHG | HEART RATE: 74 BPM | WEIGHT: 150 LBS | RESPIRATION RATE: 16 BRPM | BODY MASS INDEX: 24.11 KG/M2 | DIASTOLIC BLOOD PRESSURE: 74 MMHG | OXYGEN SATURATION: 98 % | TEMPERATURE: 98 F

## 2023-09-26 DIAGNOSIS — C50.111 MALIGNANT NEOPLASM OF CENTRAL PORTION OF RIGHT BREAST IN FEMALE, ESTROGEN RECEPTOR POSITIVE: ICD-10-CM

## 2023-09-26 DIAGNOSIS — Z15.01 BRCA2 GENE MUTATION POSITIVE: ICD-10-CM

## 2023-09-26 DIAGNOSIS — Z17.0 MALIGNANT NEOPLASM OF CENTRAL PORTION OF RIGHT BREAST IN FEMALE, ESTROGEN RECEPTOR POSITIVE: ICD-10-CM

## 2023-09-26 DIAGNOSIS — Z15.09 BRCA2 GENE MUTATION POSITIVE: ICD-10-CM

## 2023-09-26 DIAGNOSIS — C22.0 HEPATOCELLULAR CARCINOMA: ICD-10-CM

## 2023-09-26 DIAGNOSIS — E03.2 HYPOTHYROIDISM DUE TO DRUGS: ICD-10-CM

## 2023-09-26 DIAGNOSIS — C22.0 HCC (HEPATOCELLULAR CARCINOMA): Primary | ICD-10-CM

## 2023-09-26 LAB
ALBUMIN SERPL-MCNC: 3.8 G/DL (ref 3.4–4.8)
ALBUMIN/GLOB SERPL: 1.1 RATIO (ref 1.1–2)
ALP SERPL-CCNC: 69 UNIT/L (ref 40–150)
ALT SERPL-CCNC: 20 UNIT/L (ref 0–55)
AST SERPL-CCNC: 18 UNIT/L (ref 5–34)
BASOPHILS # BLD AUTO: 0.04 X10(3)/MCL
BASOPHILS NFR BLD AUTO: 0.9 %
BILIRUB SERPL-MCNC: 0.7 MG/DL
BUN SERPL-MCNC: 14.7 MG/DL (ref 9.8–20.1)
CALCIUM SERPL-MCNC: 9.4 MG/DL (ref 8.4–10.2)
CHLORIDE SERPL-SCNC: 106 MMOL/L (ref 98–107)
CO2 SERPL-SCNC: 28 MMOL/L (ref 23–31)
CREAT SERPL-MCNC: 0.84 MG/DL (ref 0.55–1.02)
EOSINOPHIL # BLD AUTO: 0.61 X10(3)/MCL (ref 0–0.9)
EOSINOPHIL NFR BLD AUTO: 13.8 %
ERYTHROCYTE [DISTWIDTH] IN BLOOD BY AUTOMATED COUNT: 11.9 % (ref 11.5–17)
GFR SERPLBLD CREATININE-BSD FMLA CKD-EPI: >60 MLS/MIN/1.73/M2
GLOBULIN SER-MCNC: 3.4 GM/DL (ref 2.4–3.5)
GLUCOSE SERPL-MCNC: 122 MG/DL (ref 82–115)
HCT VFR BLD AUTO: 44.7 % (ref 37–47)
HGB BLD-MCNC: 14.3 G/DL (ref 12–16)
IMM GRANULOCYTES # BLD AUTO: 0 X10(3)/MCL (ref 0–0.04)
IMM GRANULOCYTES NFR BLD AUTO: 0 %
LYMPHOCYTES # BLD AUTO: 1.25 X10(3)/MCL (ref 0.6–4.6)
LYMPHOCYTES NFR BLD AUTO: 28.3 %
MCH RBC QN AUTO: 29.5 PG (ref 27–31)
MCHC RBC AUTO-ENTMCNC: 32 G/DL (ref 33–36)
MCV RBC AUTO: 92.2 FL (ref 80–94)
MONOCYTES # BLD AUTO: 0.42 X10(3)/MCL (ref 0.1–1.3)
MONOCYTES NFR BLD AUTO: 9.5 %
NEUTROPHILS # BLD AUTO: 2.09 X10(3)/MCL (ref 2.1–9.2)
NEUTROPHILS NFR BLD AUTO: 47.5 %
PLATELET # BLD AUTO: 197 X10(3)/MCL (ref 130–400)
PMV BLD AUTO: 8.3 FL (ref 7.4–10.4)
POTASSIUM SERPL-SCNC: 4.5 MMOL/L (ref 3.5–5.1)
PROT SERPL-MCNC: 7.2 GM/DL (ref 5.8–7.6)
RBC # BLD AUTO: 4.85 X10(6)/MCL (ref 4.2–5.4)
SODIUM SERPL-SCNC: 141 MMOL/L (ref 136–145)
TSH SERPL-ACNC: 1.81 UIU/ML (ref 0.35–4.94)
WBC # SPEC AUTO: 4.41 X10(3)/MCL (ref 4.5–11.5)

## 2023-09-26 PROCEDURE — 96413 CHEMO IV INFUSION 1 HR: CPT

## 2023-09-26 PROCEDURE — 36415 COLL VENOUS BLD VENIPUNCTURE: CPT

## 2023-09-26 PROCEDURE — 84443 ASSAY THYROID STIM HORMONE: CPT

## 2023-09-26 PROCEDURE — 80053 COMPREHEN METABOLIC PANEL: CPT

## 2023-09-26 PROCEDURE — 63600175 PHARM REV CODE 636 W HCPCS: Mod: JZ,JG | Performed by: INTERNAL MEDICINE

## 2023-09-26 PROCEDURE — 25000003 PHARM REV CODE 250: Performed by: INTERNAL MEDICINE

## 2023-09-26 PROCEDURE — 85025 COMPLETE CBC W/AUTO DIFF WBC: CPT

## 2023-09-26 RX ORDER — SODIUM CHLORIDE 0.9 % (FLUSH) 0.9 %
10 SYRINGE (ML) INJECTION
Status: DISCONTINUED | OUTPATIENT
Start: 2023-09-26 | End: 2023-09-26 | Stop reason: HOSPADM

## 2023-09-26 RX ORDER — HEPARIN 100 UNIT/ML
500 SYRINGE INTRAVENOUS
Status: DISCONTINUED | OUTPATIENT
Start: 2023-09-26 | End: 2023-09-26 | Stop reason: HOSPADM

## 2023-09-26 RX ADMIN — SODIUM CHLORIDE 480 MG: 9 INJECTION, SOLUTION INTRAVENOUS at 03:09

## 2023-09-26 RX ADMIN — SODIUM CHLORIDE: 9 INJECTION, SOLUTION INTRAVENOUS at 03:09

## 2023-10-16 ENCOUNTER — TELEPHONE (OUTPATIENT)
Dept: INTERNAL MEDICINE | Facility: CLINIC | Age: 68
End: 2023-10-16
Payer: MEDICARE

## 2023-10-16 DIAGNOSIS — K21.9 GASTROESOPHAGEAL REFLUX DISEASE WITHOUT ESOPHAGITIS: Primary | ICD-10-CM

## 2023-10-16 RX ORDER — PANTOPRAZOLE SODIUM 20 MG/1
20 TABLET, DELAYED RELEASE ORAL DAILY
Qty: 30 TABLET | Refills: 11 | Status: SHIPPED | OUTPATIENT
Start: 2023-10-16 | End: 2024-03-06

## 2023-10-16 NOTE — TELEPHONE ENCOUNTER
Pt states she normally takes omeprazole for GERD but she took some Protonix 40 mg that she had left over from when she was doing chemo and it worked better. She wants to know if she should continue to take this for her symptoms and if so, can MD write a script. Please advise

## 2023-10-16 NOTE — TELEPHONE ENCOUNTER
RX sent for pantoprazole 20mg daily. May take 2 tab for a week, but would like to reduce dose to 20mg daily. Higher dosages may inc risks for osteoporosis development.  F/u as needed. D/c omeprazole.

## 2023-10-16 NOTE — TELEPHONE ENCOUNTER
----- Message from Katelyn Rodgers sent at 10/16/2023  9:28 AM CDT -----  .Type:  Needs Medical Advice    Who Called: pt  Symptoms (please be specific):    How long has patient had these symptoms:   Pharmacy name and phone #:    Would the patient rather a call back or a response via [facility]?   Best Call Back Number: [phone]  Additional Information: pt asking for the nurse to call her about a medication she didn't want to discuss it with me

## 2023-10-22 RX ORDER — SODIUM CHLORIDE 0.9 % (FLUSH) 0.9 %
10 SYRINGE (ML) INJECTION
Status: CANCELLED | OUTPATIENT
Start: 2023-10-24

## 2023-10-22 RX ORDER — HEPARIN 100 UNIT/ML
500 SYRINGE INTRAVENOUS
Status: CANCELLED | OUTPATIENT
Start: 2023-10-24

## 2023-10-24 ENCOUNTER — INFUSION (OUTPATIENT)
Dept: INFUSION THERAPY | Facility: HOSPITAL | Age: 68
End: 2023-10-24
Attending: INTERNAL MEDICINE
Payer: MEDICARE

## 2023-10-24 VITALS
BODY MASS INDEX: 24.21 KG/M2 | SYSTOLIC BLOOD PRESSURE: 124 MMHG | TEMPERATURE: 98 F | OXYGEN SATURATION: 97 % | DIASTOLIC BLOOD PRESSURE: 84 MMHG | HEART RATE: 71 BPM | WEIGHT: 149.94 LBS

## 2023-10-24 DIAGNOSIS — C22.0 HCC (HEPATOCELLULAR CARCINOMA): Primary | ICD-10-CM

## 2023-10-24 PROCEDURE — 25000003 PHARM REV CODE 250: Performed by: INTERNAL MEDICINE

## 2023-10-24 PROCEDURE — 96413 CHEMO IV INFUSION 1 HR: CPT

## 2023-10-24 PROCEDURE — 63600175 PHARM REV CODE 636 W HCPCS: Mod: JZ,JG | Performed by: INTERNAL MEDICINE

## 2023-10-24 RX ORDER — HEPARIN 100 UNIT/ML
500 SYRINGE INTRAVENOUS
Status: DISCONTINUED | OUTPATIENT
Start: 2023-10-24 | End: 2023-10-26 | Stop reason: HOSPADM

## 2023-10-24 RX ORDER — SODIUM CHLORIDE 0.9 % (FLUSH) 0.9 %
10 SYRINGE (ML) INJECTION
Status: DISCONTINUED | OUTPATIENT
Start: 2023-10-24 | End: 2023-10-26 | Stop reason: HOSPADM

## 2023-10-24 RX ADMIN — SODIUM CHLORIDE 480 MG: 9 INJECTION, SOLUTION INTRAVENOUS at 03:10

## 2023-11-03 PROBLEM — C50.111 MALIGNANT NEOPLASM OF CENTRAL PORTION OF RIGHT BREAST IN FEMALE, ESTROGEN RECEPTOR POSITIVE: Status: ACTIVE | Noted: 2023-11-03

## 2023-11-03 PROBLEM — E03.2 HYPOTHYROIDISM DUE TO DRUGS: Status: ACTIVE | Noted: 2023-11-03

## 2023-11-03 PROBLEM — Z17.0 MALIGNANT NEOPLASM OF CENTRAL PORTION OF RIGHT BREAST IN FEMALE, ESTROGEN RECEPTOR POSITIVE: Status: ACTIVE | Noted: 2023-11-03

## 2023-11-03 NOTE — PROGRESS NOTES
"Subjective:       Patient ID: Maci Lewis is a 67 y.o. female.    Chief Complaint:  "Increased reflux, better now"    Diagnosis: Recurrent multifocal hepatocellular carcinoma                    Stage IIA right breast cancer 10/13 (T2 N0 M0), 2.4 cm, grade III, ER/VT neg, Her-2 neg                    Positive BRCA2 mutation                    S/p bilateral mastectomies and BSO                     + COVID-19 vaccinated    Treatment History  AC x4 (2/14) -->weekly Taxol x12 (6//14) ---> XRT x 14 days ---> bilateral mastectomies  Partial right hepatectomy 7/16  Right hepatic segmentectomy 11/17  Y-90 RHA 6/19/18; Y-90 LHA 8/14/18  Nexavar 8/2-8/16/18    Current Treatment: Nivolumab Q 4 weeks (started 10/25/18)    Clinical History:  Patient underwent right breast lumpectomy and SLND 10/28/13. Final pathology showed a 2.5 cm grade 3 infiltrating ductal carcinoma with clear margins, no lymphovascular invasion and 6 negative LN's.  She completed adjuvant chemotherapy followed by radiation as outlined above.  Genetic testing was positive for a BRCA2 gene mutation.  She underwent bilateral mastectomies and ROCIO reconstruction 12/8/14    She presented to the hospital 7/24/16 with acute right neck and shoulder pain, followed by abdominal pain and distention.  She was anemic and hypotensive. CT of the abdomen showed 2 complex heterogeneous masses along the inferior aspect of the right hepatic lobe.  The larger more peripheral mass showed evidence of active hemorrhage.  There was a large subcapsular hematoma with free fluid in the pelvis.  Attempted embolization by interventional radiology was unsuccessful.  She was taken to the OR for exploratory laparotomy.  A large intra-abdominal blood collection was evacuated.  There was tumor in hepatic segment 6 with necrosis and rupture with some minimal oozing but no active bleeding.  Partial right hepatectomy was performed.  Intraoperative ultrasound of the liver showed no " other suspicious lesions.  Alpha-fetoprotein level was normal.  Final pathology showed a 5.3 cm intermediate grade hepatocellular carcinoma with focal hemorrhagic infarction, fibrosis and hepatic capsular rupture.  Margins of resection were clear. CT A/P 10/14/16 showed postoperative changes in the lateral inferior margin of the right hepatic lobe and a small fluid collection. There was no abnormal contrast enhancement or evidence of suspicious liver lesions. Repeat scans 2/14/17 showed stable postoperative changes with improved small perihepatic fluid collection.    CT of the abdomen and pelvis 7/24/17 showed postsurgical changes along the inferior right hepatic lobe with a new area of hypoattenuation inferiorly along the suture line measuring up to about 12 mm. This finding was not associated with contrast enhancement to suggest recurrence. Short-term interval follow-up was recommended in 3 months by Radiology. There were small stable hepatic cysts. Follow-up CT scan 10/25/17 showed significant increase of the hypodense lesion adjacent to the suture line in the right hepatic lobe to 4.7 x 4.2 cm. There was minimal low-density change within the lesion suggesting central necrosis and subtle contrast enhancement. Alpha-fetoprotein level was normal. CT scan of the chest 11/3/17 showed no evidence of metastatic disease. There was a small hypodense nodule in the right thyroid. She was referred to surgical oncology and underwent resection of segment 6 of the liver 11/24/17 along with cholecystectomy. Final pathology showed a moderately differentiated hepatocellular carcinoma measuring 6.7 cm in greatest dimension. There was no evidence of vascular or perineural invasion. All resection margins were clear.    Surveillance CT scan of the abdomen and pelvis 5/25/18 showed significant progression and recurrence of her disease with a dominant mass in segment 5 of the right hepatic lobe measuring 6.7 cm and a new mass in the  right lobe measuring 3.7 cm in segment 7 and also in segment 8 measuring 1.6 cm.  A new lesion in the left lobe of the liver measured 4.2 cm.  Alpha-fetoprotein level was normal at 4.5 ng/mL.  CT-guided biopsy 6/5/18 confirmed hepatocellular carcinoma, well-differentiated.     She underwent Y-90 radioembolization to the right hepatic artery to a total dose of 335 mGy 6/19/18.  She was evaluated at [facility] 6/20/18 by Dr. Gigi Perkins for consideration of liver transplant.  Based on her disease burden, she did not meet criteria for liver transplantation.  Recommendations were to attempt to downstage her tumor burden through radioembolization and potentially revisit transplantation in the future depending on her response.  Her case was presented at interdisciplinary conference, and these recommendations were supported, along with addition of Nexavar for systemic treatment as tolerated.  Nexavar was initiated 8/2/18. She underwent Y-90 radioembolization of the left hepatic artery 8/14/18 to a total dose of 256 mGy.  Nexavar was discontinued secondary to significant toxicity including a severe rash, alopecia, anemia and transaminitis.    She was seen at [facility] 10/18/18 by Dr. Christopher for a second treatment opinion. She was not eligible for any current clinical trials at their institution. Her pathology was requested and submitted for next generation sequencing to determine if she had any targetable mutations. We discussed further treatment and both Dr. Christopher and I agreed that her best option for further therapy would be treatment with Nivolumab given the uncharacteristic nature and behavior of her multifocal hepatocellular carcinoma.  Treatment was initiated 10/25/18.    Restaging CT of the abdomen 1/15/19 after 3 months of therapy showed significant disease regression.  The reference left hepatic lobe mass measured 4.8 x 2.0 cm (previously 10.1 x 6.9 cm), mass in segment IV was 4.3 x 2.0 cm (previous 7.6 x 7.3 cm) and a  posterior right hepatic lobe mass was 2.7 x 2.1 cm (previous 5.2 x 4.3 cm). Her transaminases completely normalized and her anemia resolved. CT scans of the chest, abdomen and pelvis 4/4/19 showed continued decrease in size of the multiple hepatic lesions. Representative left hepatic lobe mass measured 3.6 cm (previous 4.8 cm). There were no new lesions. Spleen was unremarkable. There was a 2 mm noncalcified nodule in the left upper lobe. There was no suspicious mediastinal or hilar adenopathy. CT scans of the chest, abdomen and pelvis 4/2/19 showed continued interval decrease of the hepatic metastatic lesions and a 2 mm noncalcified nodule in the left upper lobe with no new sites of disease. Serial CT scans have shown no significant disease progression.     Interval History  She returns to day by herself for a four-month follow-up visit.  She remains on nivolumab laboratory testing has remained stable.  She has had no signs or symptoms suspicious for immune toxicity.  She has had intermittent, minor gum irritation that she noted to her dentist.  No abdominal symptoms or complaints.  Appetite is excellent, no weight loss.  CT scans of the chest, abdomen and pelvis showed stable pericapsular calcifications in the right and left were.  There were no appreciable masses or findings suspicious for disease recurrence.      Mrs. Lewis is here today by herself for a four month on treatment follow-up visit.  She feels well overall.  She continues on Nivolumab every 4 weeks with excellent tolerance.  CT scans 6/20/23 showed showed stable pericapsular calcifications in the right and left were.  There were no appreciable masses or findings suspicious for disease recurrence. She has had no signs or symptoms suspicious for immune toxicity.  She recently had a flare of reflux.  She took Protonix 40 mg daily for a few weeks with improvement and transitioned to Omeprazole 20 mg daily.  She is also making dietary/lifestyle  "modifications.  She is concerned about long term PPI therapy.      Review of Systems   Constitutional:  Negative for appetite change, fatigue, fever and unexpected weight change.   HENT:  Negative for mouth sores, sore throat and trouble swallowing.    Eyes: Negative.  Negative for visual disturbance.   Respiratory:  Negative for cough and shortness of breath.    Cardiovascular:  Negative for chest pain, palpitations and leg swelling.   Gastrointestinal:  Positive for reflux. Negative for abdominal distention, abdominal pain, change in bowel habit, constipation, diarrhea, nausea and vomiting.   Genitourinary:  Negative for dysuria, frequency and urgency.   Musculoskeletal:  Positive for arthralgias (Mild). Negative for back pain.   Integumentary:  Negative for rash and mole/lesion.   Neurological:  Negative for dizziness, weakness, numbness and headaches.   Hematological:  Negative for adenopathy. Does not bruise/bleed easily.   Psychiatric/Behavioral: Negative.  The patient is not nervous/anxious.          PMHx:  Arthritis, breast cancer  PSHx:  Tonsils, cholecystectomy, left foot surgery, hysterectomy/BSO, right breast lumpectomy, bilateral mastectomies with reconstruction, HCC resection x2, Y-90 embolization  SH:  Lifetime nonsmoker, occasional social alcohol use. Lives in [place] with her , retired .  FH:  Father, paternal uncle and maternal grandfather had kidney cancer. A great aunt had breast cancer.     Objective:        /77   Pulse 71   Temp 97.8 °F (36.6 °C)   Resp 15   Ht 5' 5" (1.651 m)   Wt 67 kg (147 lb 12.8 oz)   SpO2 97%   BMI 24.60 kg/m²    Physical Exam  Constitutional:       Comments: Well-developed white female in NAD   HENT:      Head: Normocephalic.      Mouth/Throat:      Mouth: Mucous membranes are moist.      Pharynx: Oropharynx is clear. No posterior oropharyngeal erythema.   Eyes:      Extraocular Movements: Extraocular movements intact.      " Conjunctiva/sclera: Conjunctivae normal.      Pupils: Pupils are equal, round, and reactive to light.   Cardiovascular:      Rate and Rhythm: Normal rate and regular rhythm.      Heart sounds: No murmur heard.  Pulmonary:      Comments: Lungs clear to auscultation  Chest:      Comments: Bilateral mastectomies with ROCIO reconstruction.  No palpable masses, skin changes or axillary nodes bilaterally.  Abdominal:      General: Bowel sounds are normal. There is no distension.      Palpations: Abdomen is soft.      Tenderness: There is no abdominal tenderness.      Comments: Well-healed RUQ incision.  No palpable masses or organomegaly.   Musculoskeletal:         General: No swelling or tenderness. Normal range of motion.      Cervical back: Neck supple. No tenderness.   Skin:     General: Skin is warm and dry.      Findings: No rash.   Neurological:      General: No focal deficit present.      Mental Status: She is oriented to person, place, and time.      Cranial Nerves: No cranial nerve deficit.      Motor: No weakness.       ECOG SCORE    1 - Restricted in strenuous activity-ambulatory and able to carry out work of a light nature          LABORATORY  No new laboratory for review    Assessment:   Recurrent multifocal hepatocellular carcinoma  Stage IIA triple negative breast cancer 10/13 - KASSI  Positive BRCA2 mutation  Reflux    Plan:   Continue treatment with Nivolumab every 4 weeks.  CBC, CMP, TSH every 8 weeks.  Discussed transitioning from PPI to Famotidine for maintenance reflux therapy.  GI referral if needed.  Otherwise, patient will return in 4 months for follow-up.  Plan for repeat imaging 6/24.  Patient in agreement.  All questions answered.    VICENTE Potter, FNP-C    Other Physicians  Dr. Arthur Patel ()  Dr. Richard Perera

## 2023-11-06 ENCOUNTER — OFFICE VISIT (OUTPATIENT)
Dept: HEMATOLOGY/ONCOLOGY | Facility: CLINIC | Age: 68
End: 2023-11-06
Payer: MEDICARE

## 2023-11-06 VITALS
OXYGEN SATURATION: 97 % | RESPIRATION RATE: 15 BRPM | BODY MASS INDEX: 24.63 KG/M2 | SYSTOLIC BLOOD PRESSURE: 117 MMHG | TEMPERATURE: 98 F | DIASTOLIC BLOOD PRESSURE: 77 MMHG | HEIGHT: 65 IN | WEIGHT: 147.81 LBS | HEART RATE: 71 BPM

## 2023-11-06 DIAGNOSIS — C50.111 MALIGNANT NEOPLASM OF CENTRAL PORTION OF RIGHT BREAST IN FEMALE, ESTROGEN RECEPTOR POSITIVE: ICD-10-CM

## 2023-11-06 DIAGNOSIS — Z15.01 BRCA2 GENE MUTATION POSITIVE: ICD-10-CM

## 2023-11-06 DIAGNOSIS — E03.2 HYPOTHYROIDISM DUE TO DRUGS: ICD-10-CM

## 2023-11-06 DIAGNOSIS — C22.0 HEPATOCELLULAR CARCINOMA: Primary | ICD-10-CM

## 2023-11-06 DIAGNOSIS — Z17.0 MALIGNANT NEOPLASM OF CENTRAL PORTION OF RIGHT BREAST IN FEMALE, ESTROGEN RECEPTOR POSITIVE: ICD-10-CM

## 2023-11-06 DIAGNOSIS — Z15.09 BRCA2 GENE MUTATION POSITIVE: ICD-10-CM

## 2023-11-06 PROCEDURE — 99214 PR OFFICE/OUTPT VISIT, EST, LEVL IV, 30-39 MIN: ICD-10-PCS | Mod: S$PBB,,, | Performed by: NURSE PRACTITIONER

## 2023-11-06 PROCEDURE — 99214 OFFICE O/P EST MOD 30 MIN: CPT | Mod: PBBFAC | Performed by: NURSE PRACTITIONER

## 2023-11-06 PROCEDURE — 99999 PR PBB SHADOW E&M-EST. PATIENT-LVL IV: ICD-10-PCS | Mod: PBBFAC,,, | Performed by: NURSE PRACTITIONER

## 2023-11-06 PROCEDURE — 99214 OFFICE O/P EST MOD 30 MIN: CPT | Mod: S$PBB,,, | Performed by: NURSE PRACTITIONER

## 2023-11-06 PROCEDURE — 99999 PR PBB SHADOW E&M-EST. PATIENT-LVL IV: CPT | Mod: PBBFAC,,, | Performed by: NURSE PRACTITIONER

## 2023-11-06 RX ORDER — OMEPRAZOLE 40 MG/1
40 CAPSULE, DELAYED RELEASE ORAL DAILY
COMMUNITY

## 2023-11-18 RX ORDER — HEPARIN 100 UNIT/ML
500 SYRINGE INTRAVENOUS
Status: CANCELLED | OUTPATIENT
Start: 2023-11-21

## 2023-11-18 RX ORDER — SODIUM CHLORIDE 0.9 % (FLUSH) 0.9 %
10 SYRINGE (ML) INJECTION
Status: CANCELLED | OUTPATIENT
Start: 2023-11-21

## 2023-11-21 ENCOUNTER — INFUSION (OUTPATIENT)
Dept: INFUSION THERAPY | Facility: HOSPITAL | Age: 68
End: 2023-11-21
Attending: INTERNAL MEDICINE
Payer: MEDICARE

## 2023-11-21 ENCOUNTER — LAB VISIT (OUTPATIENT)
Dept: LAB | Facility: HOSPITAL | Age: 68
End: 2023-11-21
Attending: INTERNAL MEDICINE
Payer: MEDICARE

## 2023-11-21 VITALS
TEMPERATURE: 98 F | SYSTOLIC BLOOD PRESSURE: 117 MMHG | HEART RATE: 62 BPM | OXYGEN SATURATION: 98 % | DIASTOLIC BLOOD PRESSURE: 81 MMHG

## 2023-11-21 DIAGNOSIS — C22.0 HEPATOCELLULAR CARCINOMA: Primary | ICD-10-CM

## 2023-11-21 DIAGNOSIS — Z17.0 MALIGNANT NEOPLASM OF CENTRAL PORTION OF RIGHT BREAST IN FEMALE, ESTROGEN RECEPTOR POSITIVE: ICD-10-CM

## 2023-11-21 DIAGNOSIS — Z15.09 BRCA2 GENE MUTATION POSITIVE: ICD-10-CM

## 2023-11-21 DIAGNOSIS — C50.111 MALIGNANT NEOPLASM OF CENTRAL PORTION OF RIGHT BREAST IN FEMALE, ESTROGEN RECEPTOR POSITIVE: ICD-10-CM

## 2023-11-21 DIAGNOSIS — C22.0 HEPATOCELLULAR CARCINOMA: ICD-10-CM

## 2023-11-21 DIAGNOSIS — E03.2 HYPOTHYROIDISM DUE TO DRUGS: ICD-10-CM

## 2023-11-21 DIAGNOSIS — Z15.01 BRCA2 GENE MUTATION POSITIVE: ICD-10-CM

## 2023-11-21 LAB
ALBUMIN SERPL-MCNC: 3.6 G/DL (ref 3.4–4.8)
ALBUMIN/GLOB SERPL: 1.1 RATIO (ref 1.1–2)
ALP SERPL-CCNC: 67 UNIT/L (ref 40–150)
ALT SERPL-CCNC: 16 UNIT/L (ref 0–55)
AST SERPL-CCNC: 15 UNIT/L (ref 5–34)
BASOPHILS # BLD AUTO: 0.03 X10(3)/MCL
BASOPHILS NFR BLD AUTO: 0.9 %
BILIRUB SERPL-MCNC: 0.6 MG/DL
BUN SERPL-MCNC: 15.6 MG/DL (ref 9.8–20.1)
CALCIUM SERPL-MCNC: 9.1 MG/DL (ref 8.4–10.2)
CHLORIDE SERPL-SCNC: 106 MMOL/L (ref 98–107)
CO2 SERPL-SCNC: 28 MMOL/L (ref 23–31)
CREAT SERPL-MCNC: 0.83 MG/DL (ref 0.55–1.02)
EOSINOPHIL # BLD AUTO: 0.26 X10(3)/MCL (ref 0–0.9)
EOSINOPHIL NFR BLD AUTO: 7.4 %
ERYTHROCYTE [DISTWIDTH] IN BLOOD BY AUTOMATED COUNT: 11.8 % (ref 11.5–17)
GFR SERPLBLD CREATININE-BSD FMLA CKD-EPI: >60 MLS/MIN/1.73/M2
GLOBULIN SER-MCNC: 3.3 GM/DL (ref 2.4–3.5)
GLUCOSE SERPL-MCNC: 111 MG/DL (ref 82–115)
HCT VFR BLD AUTO: 43.5 % (ref 37–47)
HGB BLD-MCNC: 13.8 G/DL (ref 12–16)
IMM GRANULOCYTES # BLD AUTO: 0.01 X10(3)/MCL (ref 0–0.04)
IMM GRANULOCYTES NFR BLD AUTO: 0.3 %
LYMPHOCYTES # BLD AUTO: 0.97 X10(3)/MCL (ref 0.6–4.6)
LYMPHOCYTES NFR BLD AUTO: 27.7 %
MCH RBC QN AUTO: 29.6 PG (ref 27–31)
MCHC RBC AUTO-ENTMCNC: 31.7 G/DL (ref 33–36)
MCV RBC AUTO: 93.1 FL (ref 80–94)
MONOCYTES # BLD AUTO: 0.33 X10(3)/MCL (ref 0.1–1.3)
MONOCYTES NFR BLD AUTO: 9.4 %
NEUTROPHILS # BLD AUTO: 1.9 X10(3)/MCL (ref 2.1–9.2)
NEUTROPHILS NFR BLD AUTO: 54.3 %
PLATELET # BLD AUTO: 157 X10(3)/MCL (ref 130–400)
PMV BLD AUTO: 8.5 FL (ref 7.4–10.4)
POTASSIUM SERPL-SCNC: 4.1 MMOL/L (ref 3.5–5.1)
PROT SERPL-MCNC: 6.9 GM/DL (ref 5.8–7.6)
RBC # BLD AUTO: 4.67 X10(6)/MCL (ref 4.2–5.4)
SODIUM SERPL-SCNC: 141 MMOL/L (ref 136–145)
TSH SERPL-ACNC: 1.47 UIU/ML (ref 0.35–4.94)
WBC # SPEC AUTO: 3.5 X10(3)/MCL (ref 4.5–11.5)

## 2023-11-21 PROCEDURE — 85025 COMPLETE CBC W/AUTO DIFF WBC: CPT

## 2023-11-21 PROCEDURE — 80053 COMPREHEN METABOLIC PANEL: CPT

## 2023-11-21 PROCEDURE — 36415 COLL VENOUS BLD VENIPUNCTURE: CPT

## 2023-11-21 PROCEDURE — 25000003 PHARM REV CODE 250: Performed by: INTERNAL MEDICINE

## 2023-11-21 PROCEDURE — 96413 CHEMO IV INFUSION 1 HR: CPT

## 2023-11-21 PROCEDURE — 63600175 PHARM REV CODE 636 W HCPCS: Mod: JZ,JG | Performed by: INTERNAL MEDICINE

## 2023-11-21 PROCEDURE — 84443 ASSAY THYROID STIM HORMONE: CPT

## 2023-11-21 RX ORDER — HEPARIN 100 UNIT/ML
500 SYRINGE INTRAVENOUS
Status: DISCONTINUED | OUTPATIENT
Start: 2023-11-21 | End: 2023-11-21 | Stop reason: HOSPADM

## 2023-11-21 RX ORDER — SODIUM CHLORIDE 0.9 % (FLUSH) 0.9 %
10 SYRINGE (ML) INJECTION
Status: DISCONTINUED | OUTPATIENT
Start: 2023-11-21 | End: 2023-11-21 | Stop reason: HOSPADM

## 2023-11-21 RX ADMIN — SODIUM CHLORIDE 480 MG: 9 INJECTION, SOLUTION INTRAVENOUS at 03:11

## 2023-12-15 RX ORDER — HEPARIN 100 UNIT/ML
500 SYRINGE INTRAVENOUS
Status: CANCELLED | OUTPATIENT
Start: 2023-12-19

## 2023-12-15 RX ORDER — SODIUM CHLORIDE 0.9 % (FLUSH) 0.9 %
10 SYRINGE (ML) INJECTION
Status: CANCELLED | OUTPATIENT
Start: 2023-12-19

## 2023-12-19 ENCOUNTER — INFUSION (OUTPATIENT)
Dept: INFUSION THERAPY | Facility: HOSPITAL | Age: 68
End: 2023-12-19
Attending: INTERNAL MEDICINE
Payer: MEDICARE

## 2023-12-19 VITALS
HEART RATE: 63 BPM | TEMPERATURE: 98 F | RESPIRATION RATE: 18 BRPM | WEIGHT: 144.19 LBS | BODY MASS INDEX: 24.02 KG/M2 | HEIGHT: 65 IN | OXYGEN SATURATION: 98 % | SYSTOLIC BLOOD PRESSURE: 121 MMHG | DIASTOLIC BLOOD PRESSURE: 80 MMHG

## 2023-12-19 DIAGNOSIS — C22.0 HEPATOCELLULAR CARCINOMA: Primary | ICD-10-CM

## 2023-12-19 PROCEDURE — 63600175 PHARM REV CODE 636 W HCPCS: Mod: JZ,JG | Performed by: INTERNAL MEDICINE

## 2023-12-19 PROCEDURE — 96413 CHEMO IV INFUSION 1 HR: CPT

## 2023-12-19 PROCEDURE — 25000003 PHARM REV CODE 250: Performed by: INTERNAL MEDICINE

## 2023-12-19 RX ORDER — HEPARIN 100 UNIT/ML
500 SYRINGE INTRAVENOUS
Status: DISCONTINUED | OUTPATIENT
Start: 2023-12-19 | End: 2023-12-19 | Stop reason: HOSPADM

## 2023-12-19 RX ORDER — SODIUM CHLORIDE 0.9 % (FLUSH) 0.9 %
10 SYRINGE (ML) INJECTION
Status: DISCONTINUED | OUTPATIENT
Start: 2023-12-19 | End: 2023-12-19 | Stop reason: HOSPADM

## 2023-12-19 RX ADMIN — SODIUM CHLORIDE 480 MG: 9 INJECTION, SOLUTION INTRAVENOUS at 03:12

## 2023-12-19 RX ADMIN — SODIUM CHLORIDE: 9 INJECTION, SOLUTION INTRAVENOUS at 03:12

## 2024-01-12 RX ORDER — HEPARIN 100 UNIT/ML
500 SYRINGE INTRAVENOUS
Status: CANCELLED | OUTPATIENT
Start: 2024-01-16

## 2024-01-12 RX ORDER — SODIUM CHLORIDE 0.9 % (FLUSH) 0.9 %
10 SYRINGE (ML) INJECTION
Status: CANCELLED | OUTPATIENT
Start: 2024-01-16

## 2024-01-16 ENCOUNTER — LAB VISIT (OUTPATIENT)
Dept: LAB | Facility: HOSPITAL | Age: 69
End: 2024-01-16
Attending: INTERNAL MEDICINE
Payer: MEDICARE

## 2024-01-16 ENCOUNTER — INFUSION (OUTPATIENT)
Dept: INFUSION THERAPY | Facility: HOSPITAL | Age: 69
End: 2024-01-16
Attending: INTERNAL MEDICINE
Payer: MEDICARE

## 2024-01-16 VITALS
BODY MASS INDEX: 24.02 KG/M2 | HEIGHT: 65 IN | TEMPERATURE: 99 F | DIASTOLIC BLOOD PRESSURE: 91 MMHG | SYSTOLIC BLOOD PRESSURE: 142 MMHG | WEIGHT: 144.19 LBS | HEART RATE: 66 BPM | RESPIRATION RATE: 18 BRPM | OXYGEN SATURATION: 97 %

## 2024-01-16 DIAGNOSIS — C22.0 HEPATOCELLULAR CARCINOMA: ICD-10-CM

## 2024-01-16 DIAGNOSIS — C50.111 MALIGNANT NEOPLASM OF CENTRAL PORTION OF RIGHT BREAST IN FEMALE, ESTROGEN RECEPTOR POSITIVE: ICD-10-CM

## 2024-01-16 DIAGNOSIS — C22.0 HEPATOCELLULAR CARCINOMA: Primary | ICD-10-CM

## 2024-01-16 DIAGNOSIS — Z17.0 MALIGNANT NEOPLASM OF CENTRAL PORTION OF RIGHT BREAST IN FEMALE, ESTROGEN RECEPTOR POSITIVE: ICD-10-CM

## 2024-01-16 DIAGNOSIS — E03.2 HYPOTHYROIDISM DUE TO DRUGS: ICD-10-CM

## 2024-01-16 DIAGNOSIS — Z15.01 BRCA2 GENE MUTATION POSITIVE: ICD-10-CM

## 2024-01-16 DIAGNOSIS — Z15.09 BRCA2 GENE MUTATION POSITIVE: ICD-10-CM

## 2024-01-16 LAB
ALBUMIN SERPL-MCNC: 3.8 G/DL (ref 3.4–4.8)
ALBUMIN/GLOB SERPL: 1.1 RATIO (ref 1.1–2)
ALP SERPL-CCNC: 88 UNIT/L (ref 40–150)
ALT SERPL-CCNC: 21 UNIT/L (ref 0–55)
AST SERPL-CCNC: 17 UNIT/L (ref 5–34)
BASOPHILS # BLD AUTO: 0.03 X10(3)/MCL
BASOPHILS NFR BLD AUTO: 0.7 %
BILIRUB SERPL-MCNC: 0.5 MG/DL
BUN SERPL-MCNC: 14.7 MG/DL (ref 9.8–20.1)
CALCIUM SERPL-MCNC: 9.5 MG/DL (ref 8.4–10.2)
CHLORIDE SERPL-SCNC: 106 MMOL/L (ref 98–107)
CO2 SERPL-SCNC: 26 MMOL/L (ref 23–31)
CREAT SERPL-MCNC: 0.78 MG/DL (ref 0.55–1.02)
EOSINOPHIL # BLD AUTO: 0.24 X10(3)/MCL (ref 0–0.9)
EOSINOPHIL NFR BLD AUTO: 6 %
ERYTHROCYTE [DISTWIDTH] IN BLOOD BY AUTOMATED COUNT: 12.2 % (ref 11.5–17)
GFR SERPLBLD CREATININE-BSD FMLA CKD-EPI: >60 MLS/MIN/1.73/M2
GLOBULIN SER-MCNC: 3.6 GM/DL (ref 2.4–3.5)
GLUCOSE SERPL-MCNC: 117 MG/DL (ref 82–115)
HCT VFR BLD AUTO: 44.3 % (ref 37–47)
HGB BLD-MCNC: 14.5 G/DL (ref 12–16)
IMM GRANULOCYTES # BLD AUTO: 0.01 X10(3)/MCL (ref 0–0.04)
IMM GRANULOCYTES NFR BLD AUTO: 0.2 %
LYMPHOCYTES # BLD AUTO: 1.08 X10(3)/MCL (ref 0.6–4.6)
LYMPHOCYTES NFR BLD AUTO: 26.9 %
MCH RBC QN AUTO: 30 PG (ref 27–31)
MCHC RBC AUTO-ENTMCNC: 32.7 G/DL (ref 33–36)
MCV RBC AUTO: 91.5 FL (ref 80–94)
MONOCYTES # BLD AUTO: 0.33 X10(3)/MCL (ref 0.1–1.3)
MONOCYTES NFR BLD AUTO: 8.2 %
NEUTROPHILS # BLD AUTO: 2.33 X10(3)/MCL (ref 2.1–9.2)
NEUTROPHILS NFR BLD AUTO: 58 %
PLATELET # BLD AUTO: 189 X10(3)/MCL (ref 130–400)
PMV BLD AUTO: 8.9 FL (ref 7.4–10.4)
POTASSIUM SERPL-SCNC: 4.6 MMOL/L (ref 3.5–5.1)
PROT SERPL-MCNC: 7.4 GM/DL (ref 5.8–7.6)
RBC # BLD AUTO: 4.84 X10(6)/MCL (ref 4.2–5.4)
SODIUM SERPL-SCNC: 138 MMOL/L (ref 136–145)
TSH SERPL-ACNC: 2.01 UIU/ML (ref 0.35–4.94)
WBC # SPEC AUTO: 4.02 X10(3)/MCL (ref 4.5–11.5)

## 2024-01-16 PROCEDURE — 63600175 PHARM REV CODE 636 W HCPCS: Mod: JZ,JG | Performed by: NURSE PRACTITIONER

## 2024-01-16 PROCEDURE — 36415 COLL VENOUS BLD VENIPUNCTURE: CPT

## 2024-01-16 PROCEDURE — 85025 COMPLETE CBC W/AUTO DIFF WBC: CPT

## 2024-01-16 PROCEDURE — 84443 ASSAY THYROID STIM HORMONE: CPT

## 2024-01-16 PROCEDURE — 25000003 PHARM REV CODE 250: Performed by: NURSE PRACTITIONER

## 2024-01-16 PROCEDURE — 80053 COMPREHEN METABOLIC PANEL: CPT

## 2024-01-16 PROCEDURE — 96413 CHEMO IV INFUSION 1 HR: CPT

## 2024-01-16 RX ORDER — SODIUM CHLORIDE 0.9 % (FLUSH) 0.9 %
10 SYRINGE (ML) INJECTION
Status: DISCONTINUED | OUTPATIENT
Start: 2024-01-16 | End: 2024-01-16 | Stop reason: HOSPADM

## 2024-01-16 RX ORDER — HEPARIN 100 UNIT/ML
500 SYRINGE INTRAVENOUS
Status: DISCONTINUED | OUTPATIENT
Start: 2024-01-16 | End: 2024-01-16 | Stop reason: HOSPADM

## 2024-01-16 RX ADMIN — SODIUM CHLORIDE 480 MG: 9 INJECTION, SOLUTION INTRAVENOUS at 10:01

## 2024-01-16 RX ADMIN — SODIUM CHLORIDE: 9 INJECTION, SOLUTION INTRAVENOUS at 10:01

## 2024-02-11 RX ORDER — HEPARIN 100 UNIT/ML
500 SYRINGE INTRAVENOUS
Status: CANCELLED | OUTPATIENT
Start: 2024-02-13

## 2024-02-11 RX ORDER — SODIUM CHLORIDE 0.9 % (FLUSH) 0.9 %
10 SYRINGE (ML) INJECTION
Status: CANCELLED | OUTPATIENT
Start: 2024-02-13

## 2024-02-14 ENCOUNTER — INFUSION (OUTPATIENT)
Dept: INFUSION THERAPY | Facility: HOSPITAL | Age: 69
End: 2024-02-14
Attending: INTERNAL MEDICINE
Payer: MEDICARE

## 2024-02-14 VITALS — HEIGHT: 65 IN | BODY MASS INDEX: 24.01 KG/M2 | WEIGHT: 144.13 LBS

## 2024-02-14 DIAGNOSIS — C22.0 HEPATOCELLULAR CARCINOMA: Primary | ICD-10-CM

## 2024-02-14 PROCEDURE — 63600175 PHARM REV CODE 636 W HCPCS: Mod: JZ,JG | Performed by: INTERNAL MEDICINE

## 2024-02-14 PROCEDURE — 96413 CHEMO IV INFUSION 1 HR: CPT

## 2024-02-14 PROCEDURE — 25000003 PHARM REV CODE 250: Performed by: INTERNAL MEDICINE

## 2024-02-14 RX ORDER — SODIUM CHLORIDE 0.9 % (FLUSH) 0.9 %
10 SYRINGE (ML) INJECTION
Status: DISCONTINUED | OUTPATIENT
Start: 2024-02-14 | End: 2024-02-14 | Stop reason: HOSPADM

## 2024-02-14 RX ORDER — HEPARIN 100 UNIT/ML
500 SYRINGE INTRAVENOUS
Status: DISCONTINUED | OUTPATIENT
Start: 2024-02-14 | End: 2024-02-14 | Stop reason: HOSPADM

## 2024-02-14 RX ADMIN — SODIUM CHLORIDE 480 MG: 9 INJECTION, SOLUTION INTRAVENOUS at 03:02

## 2024-02-19 ENCOUNTER — TELEPHONE (OUTPATIENT)
Dept: HEMATOLOGY/ONCOLOGY | Facility: CLINIC | Age: 69
End: 2024-02-19
Payer: MEDICARE

## 2024-02-19 NOTE — TELEPHONE ENCOUNTER
Patient called states that her stools where an abnormal color. Advised that her stool were yellow in coloration and she had a upset stomach recently. I spoke with dominique in regards to patients concerns and advised to let patient know to call her GI MD and see if the office can order stool studies on her. I advised patient on dominique rec's and that patient will call Dr. Jensen office for further orders in regards to her stools.

## 2024-03-04 DIAGNOSIS — Z13.6 ENCOUNTER FOR LIPID SCREENING FOR CARDIOVASCULAR DISEASE: ICD-10-CM

## 2024-03-04 DIAGNOSIS — Z00.00 ANNUAL PHYSICAL EXAM: Primary | ICD-10-CM

## 2024-03-04 DIAGNOSIS — I70.0 AORTIC ATHEROSCLEROSIS: ICD-10-CM

## 2024-03-04 DIAGNOSIS — C22.0 HCC (HEPATOCELLULAR CARCINOMA): ICD-10-CM

## 2024-03-04 DIAGNOSIS — E03.2 HYPOTHYROIDISM DUE TO DRUGS: ICD-10-CM

## 2024-03-04 DIAGNOSIS — Z13.220 ENCOUNTER FOR LIPID SCREENING FOR CARDIOVASCULAR DISEASE: ICD-10-CM

## 2024-03-06 ENCOUNTER — OFFICE VISIT (OUTPATIENT)
Dept: HEMATOLOGY/ONCOLOGY | Facility: CLINIC | Age: 69
End: 2024-03-06
Payer: MEDICARE

## 2024-03-06 VITALS
TEMPERATURE: 98 F | SYSTOLIC BLOOD PRESSURE: 125 MMHG | WEIGHT: 142.31 LBS | OXYGEN SATURATION: 98 % | RESPIRATION RATE: 15 BRPM | BODY MASS INDEX: 23.71 KG/M2 | HEART RATE: 71 BPM | DIASTOLIC BLOOD PRESSURE: 87 MMHG | HEIGHT: 65 IN

## 2024-03-06 DIAGNOSIS — C50.111 MALIGNANT NEOPLASM OF CENTRAL PORTION OF RIGHT BREAST IN FEMALE, ESTROGEN RECEPTOR POSITIVE: ICD-10-CM

## 2024-03-06 DIAGNOSIS — C22.0 HEPATOCELLULAR CARCINOMA: Primary | ICD-10-CM

## 2024-03-06 DIAGNOSIS — R19.7 DIARRHEA, UNSPECIFIED TYPE: ICD-10-CM

## 2024-03-06 DIAGNOSIS — Z17.0 MALIGNANT NEOPLASM OF CENTRAL PORTION OF RIGHT BREAST IN FEMALE, ESTROGEN RECEPTOR POSITIVE: ICD-10-CM

## 2024-03-06 DIAGNOSIS — E03.2 HYPOTHYROIDISM DUE TO DRUGS: ICD-10-CM

## 2024-03-06 PROCEDURE — 99999 PR PBB SHADOW E&M-EST. PATIENT-LVL IV: CPT | Mod: PBBFAC,,, | Performed by: NURSE PRACTITIONER

## 2024-03-06 PROCEDURE — 99214 OFFICE O/P EST MOD 30 MIN: CPT | Mod: PBBFAC | Performed by: NURSE PRACTITIONER

## 2024-03-06 PROCEDURE — 99214 OFFICE O/P EST MOD 30 MIN: CPT | Mod: S$PBB,,, | Performed by: NURSE PRACTITIONER

## 2024-03-06 NOTE — PROGRESS NOTES
"Subjective:       Patient ID: Maci Lewis is a 68 y.o. female.    Chief Complaint:  "Yellow, soft stool and more gas"    Diagnosis: Recurrent multifocal hepatocellular carcinoma                    Stage IIA right breast cancer 10/13 (T2 N0 M0), 2.4 cm, grade III, ER/OR neg, Her-2 neg                    Positive BRCA2 mutation                    S/p bilateral mastectomies and BSO                     + COVID-19 vaccinated    Treatment History  AC x4 (2/14) -->weekly Taxol x12 (6//14) ---> XRT x 14 days ---> bilateral mastectomies  Partial right hepatectomy 7/16  Right hepatic segmentectomy 11/17  Y-90 RHA 6/19/18; Y-90 LHA 8/14/18  Nexavar 8/2-8/16/18    Current Treatment: Nivolumab Q 4 weeks (started 10/25/18)    Clinical History:  Patient underwent right breast lumpectomy and SLND 10/28/13. Final pathology showed a 2.5 cm grade 3 infiltrating ductal carcinoma with clear margins, no lymphovascular invasion and 6 negative LN's.  She completed adjuvant chemotherapy followed by radiation as outlined above.  Genetic testing was positive for a BRCA2 gene mutation.  She underwent bilateral mastectomies and ROCIO reconstruction 12/8/14    She presented to the hospital 7/24/16 with acute right neck and shoulder pain, followed by abdominal pain and distention.  She was anemic and hypotensive. CT of the abdomen showed 2 complex heterogeneous masses along the inferior aspect of the right hepatic lobe.  The larger more peripheral mass showed evidence of active hemorrhage.  There was a large subcapsular hematoma with free fluid in the pelvis.  Attempted embolization by interventional radiology was unsuccessful.  She was taken to the OR for exploratory laparotomy.  A large intra-abdominal blood collection was evacuated.  There was tumor in hepatic segment 6 with necrosis and rupture with some minimal oozing but no active bleeding.  Partial right hepatectomy was performed.  Intraoperative ultrasound of the liver showed no " other suspicious lesions.  Alpha-fetoprotein level was normal.  Final pathology showed a 5.3 cm intermediate grade hepatocellular carcinoma with focal hemorrhagic infarction, fibrosis and hepatic capsular rupture.  Margins of resection were clear. CT A/P 10/14/16 showed postoperative changes in the lateral inferior margin of the right hepatic lobe and a small fluid collection. There was no abnormal contrast enhancement or evidence of suspicious liver lesions. Repeat scans 2/14/17 showed stable postoperative changes with improved small perihepatic fluid collection.    CT of the abdomen and pelvis 7/24/17 showed postsurgical changes along the inferior right hepatic lobe with a new area of hypoattenuation inferiorly along the suture line measuring up to about 12 mm. This finding was not associated with contrast enhancement to suggest recurrence. Short-term interval follow-up was recommended in 3 months by Radiology. There were small stable hepatic cysts. Follow-up CT scan 10/25/17 showed significant increase of the hypodense lesion adjacent to the suture line in the right hepatic lobe to 4.7 x 4.2 cm. There was minimal low-density change within the lesion suggesting central necrosis and subtle contrast enhancement. Alpha-fetoprotein level was normal. CT scan of the chest 11/3/17 showed no evidence of metastatic disease. There was a small hypodense nodule in the right thyroid. She was referred to surgical oncology and underwent resection of segment 6 of the liver 11/24/17 along with cholecystectomy. Final pathology showed a moderately differentiated hepatocellular carcinoma measuring 6.7 cm in greatest dimension. There was no evidence of vascular or perineural invasion. All resection margins were clear.    Surveillance CT scan of the abdomen and pelvis 5/25/18 showed significant progression and recurrence of her disease with a dominant mass in segment 5 of the right hepatic lobe measuring 6.7 cm and a new mass in the  right lobe measuring 3.7 cm in segment 7 and also in segment 8 measuring 1.6 cm.  A new lesion in the left lobe of the liver measured 4.2 cm.  Alpha-fetoprotein level was normal at 4.5 ng/mL.  CT-guided biopsy 6/5/18 confirmed hepatocellular carcinoma, well-differentiated.     She underwent Y-90 radioembolization to the right hepatic artery to a total dose of 335 mGy 6/19/18.  She was evaluated at [facility] 6/20/18 by Dr. Gigi Perkins for consideration of liver transplant.  Based on her disease burden, she did not meet criteria for liver transplantation.  Recommendations were to attempt to downstage her tumor burden through radioembolization and potentially revisit transplantation in the future depending on her response.  Her case was presented at interdisciplinary conference, and these recommendations were supported, along with addition of Nexavar for systemic treatment as tolerated.  Nexavar was initiated 8/2/18. She underwent Y-90 radioembolization of the left hepatic artery 8/14/18 to a total dose of 256 mGy.  Nexavar was discontinued secondary to significant toxicity including a severe rash, alopecia, anemia and transaminitis.    She was seen at [facility] 10/18/18 by Dr. Christopher for a second treatment opinion. She was not eligible for any current clinical trials at their institution. Her pathology was requested and submitted for next generation sequencing to determine if she had any targetable mutations. We discussed further treatment and both Dr. Christopher and I agreed that her best option for further therapy would be treatment with Nivolumab given the uncharacteristic nature and behavior of her multifocal hepatocellular carcinoma.  Treatment was initiated 10/25/18.    Restaging CT of the abdomen 1/15/19 after 3 months of therapy showed significant disease regression.  The reference left hepatic lobe mass measured 4.8 x 2.0 cm (previously 10.1 x 6.9 cm), mass in segment IV was 4.3 x 2.0 cm (previous 7.6 x 7.3 cm) and a  posterior right hepatic lobe mass was 2.7 x 2.1 cm (previous 5.2 x 4.3 cm). Her transaminases completely normalized and her anemia resolved. CT scans of the chest, abdomen and pelvis 4/4/19 showed continued decrease in size of the multiple hepatic lesions. Representative left hepatic lobe mass measured 3.6 cm (previous 4.8 cm). There were no new lesions. Spleen was unremarkable. There was a 2 mm noncalcified nodule in the left upper lobe. There was no suspicious mediastinal or hilar adenopathy. CT scans of the chest, abdomen and pelvis 4/2/19 showed continued interval decrease of the hepatic metastatic lesions and a 2 mm noncalcified nodule in the left upper lobe with no new sites of disease. Serial CT scans have shown no significant disease progression.     Interval History  Mrs. Lewis is here today by herself for a four-month on treatment follow-up visit.  She continues on nivolumab every 4 weeks for treatment of her multifocal hepatocellular cancer.  She has done well with no evidence of disease progression on CT imaging.  She contacted the office last week with complaints soft, yellow stool and increased gas that was explosive in nature.  She was referred to GI.  Her symptoms were initially felt due to gastroenteritis.  Unfortunately, her symptoms have persisted.  She had similar symptoms in 2022 and underwent a colonoscopy on 10/11/22 showing patchy, discontinuous erythema with no bleeding.  Pathology showed changes consistent with lymphocytic colitis involving the left and right areas of the colon that were sampled.  She had recently started taking Paxil, and her symptoms resolved when she discontinued the medication.  She denies abdominal pain, nausea or fever.  She has no respiratory symptoms.  She does have pruritus involving the back, but no rash.  She also has some swelling of the lower lip with a blistered area involving the left side.  Serial laboratory monitoring has been unremarkable.    Review of  "Systems   Constitutional:  Negative for appetite change, fatigue, fever and unexpected weight change.   HENT:  Negative for mouth sores, sore throat and trouble swallowing.         Tender gums, lip swelling   Eyes: Negative.  Negative for visual disturbance.   Respiratory:  Negative for cough and shortness of breath.    Cardiovascular:  Negative for chest pain, palpitations and leg swelling.   Gastrointestinal:  Positive for change in bowel habit and reflux. Negative for abdominal distention, abdominal pain, constipation, diarrhea, nausea and vomiting.   Genitourinary:  Negative for dysuria, frequency and urgency.   Musculoskeletal:  Positive for arthralgias (Mild). Negative for back pain.   Integumentary:  Negative for color change, rash and mole/lesion.        Itching, no rash   Neurological:  Negative for dizziness, weakness, numbness and headaches.   Hematological:  Negative for adenopathy. Does not bruise/bleed easily.   Psychiatric/Behavioral: Negative.  The patient is not nervous/anxious.          PMHx:  Arthritis, breast cancer  PSHx:  Tonsils, cholecystectomy, left foot surgery, hysterectomy/BSO, right breast lumpectomy, bilateral mastectomies with reconstruction, HCC resection x2, Y-90 embolization  SH:  Lifetime nonsmoker, occasional social alcohol use. Lives in [place] with her , retired .  FH:  Father, paternal uncle and maternal grandfather had kidney cancer. A great aunt had breast cancer.     Objective:        /87   Pulse 71   Temp 98.2 °F (36.8 °C)   Resp 15   Ht 5' 5" (1.651 m)   Wt 64.5 kg (142 lb 4.8 oz)   SpO2 98%   BMI 23.68 kg/m²    Physical Exam  Constitutional:       Comments: Well-developed white female in NAD   HENT:      Head: Normocephalic.      Mouth/Throat:      Mouth: Mucous membranes are moist.      Pharynx: Oropharynx is clear. No posterior oropharyngeal erythema.      Comments: Swelling of lower lip, with fluid filled area involving left lower " lip  Eyes:      Extraocular Movements: Extraocular movements intact.      Conjunctiva/sclera: Conjunctivae normal.      Pupils: Pupils are equal, round, and reactive to light.   Cardiovascular:      Rate and Rhythm: Normal rate and regular rhythm.      Heart sounds: No murmur heard.  Pulmonary:      Comments: Lungs clear to auscultation  Chest:      Comments: Bilateral mastectomies with ROCIO reconstruction.  No palpable masses, skin changes or axillary nodes bilaterally.  Abdominal:      General: Bowel sounds are normal. There is no distension.      Palpations: Abdomen is soft.      Tenderness: There is no abdominal tenderness.      Comments: Well-healed RUQ incision.  No palpable masses or organomegaly.   Musculoskeletal:         General: No swelling or tenderness. Normal range of motion.      Cervical back: Neck supple. No tenderness.   Skin:     General: Skin is warm and dry.      Findings: No rash.   Neurological:      General: No focal deficit present.      Mental Status: She is oriented to person, place, and time.      Cranial Nerves: No cranial nerve deficit.      Motor: No weakness.       ECOG SCORE    0 - Fully active-able to carry on all pre-disease performance without restriction          LABORATORY  No new laboratory for review    Assessment:   Recurrent multifocal hepatocellular carcinoma  Stage IIA triple negative breast cancer 10/13 - [facility]  Positive BRCA2 mutation  Bowel changes    Plan:   Patient has no clinical or laboratory findings worrisome for disease progression.    She is experiencing pruritus, mucositis and now bowel changes which are suspicious for immune mediated side effects.    She will continue Peridex mouthwash.  She will add Sarna or Vanicream lotion.    She has an appointment with GI later today to further evaluate her bowel changes.  Colonoscopy 10/22 showed evidence of colitis.    Depending on outcome of above, may need to adjust treatment regimen or [place] more aggressive  supportive care measures.  Next dose of Nivolumab is due 3/12/24.  Discussed with patient.  She verbalized understanding and agreement.    Right now will continue laboratory monitoring with CBC, CMP and TSH every 8 weeks.    Return to clinic in 3 months for follow-up with restaging scans.    All questions answered to the satisfaction of the patient.    VICENTE Potter, FNP-C    Other Physicians  Dr. Arthur Patel ()  Dr. Richard Perera

## 2024-03-07 ENCOUNTER — LAB REQUISITION (OUTPATIENT)
Dept: LAB | Facility: HOSPITAL | Age: 69
End: 2024-03-07
Payer: MEDICARE

## 2024-03-07 ENCOUNTER — PATIENT MESSAGE (OUTPATIENT)
Dept: HEMATOLOGY/ONCOLOGY | Facility: CLINIC | Age: 69
End: 2024-03-07
Payer: MEDICARE

## 2024-03-07 ENCOUNTER — TELEPHONE (OUTPATIENT)
Dept: INTERNAL MEDICINE | Facility: CLINIC | Age: 69
End: 2024-03-07
Payer: MEDICARE

## 2024-03-07 ENCOUNTER — LAB VISIT (OUTPATIENT)
Dept: LAB | Facility: HOSPITAL | Age: 69
End: 2024-03-07
Attending: INTERNAL MEDICINE
Payer: MEDICARE

## 2024-03-07 DIAGNOSIS — E03.2 HYPOTHYROIDISM DUE TO DRUGS: ICD-10-CM

## 2024-03-07 DIAGNOSIS — Z15.01 GENETIC SUSCEPTIBILITY TO MALIGNANT NEOPLASM OF BREAST: ICD-10-CM

## 2024-03-07 DIAGNOSIS — Z13.220 ENCOUNTER FOR LIPID SCREENING FOR CARDIOVASCULAR DISEASE: ICD-10-CM

## 2024-03-07 DIAGNOSIS — R19.7 DIARRHEA, UNSPECIFIED: ICD-10-CM

## 2024-03-07 DIAGNOSIS — Z83.710 FAMILY HISTORY OF ADENOMATOUS AND SERRATED POLYPS: ICD-10-CM

## 2024-03-07 DIAGNOSIS — R19.4 CHANGE IN BOWEL HABIT: ICD-10-CM

## 2024-03-07 DIAGNOSIS — R19.4 FREQUENT BOWEL MOVEMENTS: Primary | ICD-10-CM

## 2024-03-07 DIAGNOSIS — Z13.6 ENCOUNTER FOR LIPID SCREENING FOR CARDIOVASCULAR DISEASE: ICD-10-CM

## 2024-03-07 DIAGNOSIS — I70.0 AORTIC ATHEROSCLEROSIS: ICD-10-CM

## 2024-03-07 DIAGNOSIS — Z00.00 ANNUAL PHYSICAL EXAM: ICD-10-CM

## 2024-03-07 DIAGNOSIS — K52.832 LYMPHOCYTIC COLITIS: ICD-10-CM

## 2024-03-07 DIAGNOSIS — R19.7 DIARRHEA OF PRESUMED INFECTIOUS ORIGIN: ICD-10-CM

## 2024-03-07 DIAGNOSIS — Z83.710 FAMILY HISTORY OF FAMILIAL ADENOMATOUS POLYPOSIS: ICD-10-CM

## 2024-03-07 DIAGNOSIS — C22.0 HCC (HEPATOCELLULAR CARCINOMA): ICD-10-CM

## 2024-03-07 LAB
ALBUMIN SERPL-MCNC: 3.4 G/DL (ref 3.4–4.8)
ALBUMIN/GLOB SERPL: 1.1 RATIO (ref 1.1–2)
ALP SERPL-CCNC: 85 UNIT/L (ref 40–150)
ALT SERPL-CCNC: 28 UNIT/L (ref 0–55)
APPEARANCE UR: CLEAR
AST SERPL-CCNC: 20 UNIT/L (ref 5–34)
BACTERIA #/AREA URNS AUTO: ABNORMAL /HPF
BASOPHILS # BLD AUTO: 0.02 X10(3)/MCL
BASOPHILS NFR BLD AUTO: 0.6 %
BILIRUB SERPL-MCNC: 0.5 MG/DL
BILIRUB UR QL STRIP.AUTO: NEGATIVE
BUN SERPL-MCNC: 11.7 MG/DL (ref 9.8–20.1)
CALCIUM SERPL-MCNC: 8.3 MG/DL (ref 8.4–10.2)
CAOX CRY URNS QL MICRO: ABNORMAL /HPF
CHLORIDE SERPL-SCNC: 112 MMOL/L (ref 98–107)
CHOLEST SERPL-MCNC: 119 MG/DL
CHOLEST/HDLC SERPL: 4 {RATIO} (ref 0–5)
CO2 SERPL-SCNC: 23 MMOL/L (ref 23–31)
COLOR STL: YELLOW
COLOR UR AUTO: ABNORMAL
CONSISTENCY STL: NORMAL
CREAT SERPL-MCNC: 0.77 MG/DL (ref 0.55–1.02)
CRP SERPL-MCNC: 1.2 MG/L
CRYPTOSP AG SPEC QL: NEGATIVE
EOSINOPHIL # BLD AUTO: 0.25 X10(3)/MCL (ref 0–0.9)
EOSINOPHIL NFR BLD AUTO: 8 %
ERYTHROCYTE [DISTWIDTH] IN BLOOD BY AUTOMATED COUNT: 12.9 % (ref 11.5–17)
G LAMBLIA AG STL QL IA: NEGATIVE
GFR SERPLBLD CREATININE-BSD FMLA CKD-EPI: >60 MLS/MIN/1.73/M2
GLOBULIN SER-MCNC: 3.1 GM/DL (ref 2.4–3.5)
GLUCOSE SERPL-MCNC: 126 MG/DL (ref 82–115)
GLUCOSE UR QL STRIP.AUTO: NORMAL
H. PYLORI STOOL: NEGATIVE
HCT VFR BLD AUTO: 39.5 % (ref 37–47)
HDLC SERPL-MCNC: 34 MG/DL (ref 35–60)
HEMOCCULT SP1 STL QL: NEGATIVE
HGB BLD-MCNC: 12.8 G/DL (ref 12–16)
IMM GRANULOCYTES # BLD AUTO: 0 X10(3)/MCL (ref 0–0.04)
IMM GRANULOCYTES NFR BLD AUTO: 0 %
KETONES UR QL STRIP.AUTO: NEGATIVE
LDLC SERPL CALC-MCNC: 56 MG/DL (ref 50–140)
LEUKOCYTE ESTERASE UR QL STRIP.AUTO: 250
LYMPHOCYTES # BLD AUTO: 0.89 X10(3)/MCL (ref 0.6–4.6)
LYMPHOCYTES NFR BLD AUTO: 28.6 %
MCH RBC QN AUTO: 30 PG (ref 27–31)
MCHC RBC AUTO-ENTMCNC: 32.4 G/DL (ref 33–36)
MCV RBC AUTO: 92.5 FL (ref 80–94)
MONOCYTES # BLD AUTO: 0.38 X10(3)/MCL (ref 0.1–1.3)
MONOCYTES NFR BLD AUTO: 12.2 %
MUCOUS THREADS URNS QL MICRO: ABNORMAL /LPF
NEUTROPHILS # BLD AUTO: 1.57 X10(3)/MCL (ref 2.1–9.2)
NEUTROPHILS NFR BLD AUTO: 50.6 %
NITRITE UR QL STRIP.AUTO: NEGATIVE
NRBC BLD AUTO-RTO: 0 %
PH UR STRIP.AUTO: 5.5 [PH]
PLATELET # BLD AUTO: 156 X10(3)/MCL (ref 130–400)
PMV BLD AUTO: 9.2 FL (ref 7.4–10.4)
POTASSIUM SERPL-SCNC: 3.9 MMOL/L (ref 3.5–5.1)
PROT SERPL-MCNC: 6.5 GM/DL (ref 5.8–7.6)
PROT UR QL STRIP.AUTO: NEGATIVE
RBC # BLD AUTO: 4.27 X10(6)/MCL (ref 4.2–5.4)
RBC #/AREA URNS AUTO: ABNORMAL /HPF
RBC UR QL AUTO: NEGATIVE
SODIUM SERPL-SCNC: 140 MMOL/L (ref 136–145)
SP GR UR STRIP.AUTO: 1.02 (ref 1–1.03)
SQUAMOUS #/AREA URNS LPF: ABNORMAL /HPF
T4 FREE SERPL-MCNC: 0.84 NG/DL (ref 0.7–1.48)
TRIGL SERPL-MCNC: 143 MG/DL (ref 37–140)
TSH SERPL-ACNC: 1.94 UIU/ML (ref 0.35–4.94)
UROBILINOGEN UR STRIP-ACNC: NORMAL
VLDLC SERPL CALC-MCNC: 29 MG/DL
WBC # SPEC AUTO: 3.11 X10(3)/MCL (ref 4.5–11.5)
WBC #/AREA URNS AUTO: ABNORMAL /HPF

## 2024-03-07 PROCEDURE — 36415 COLL VENOUS BLD VENIPUNCTURE: CPT

## 2024-03-07 PROCEDURE — 81001 URINALYSIS AUTO W/SCOPE: CPT

## 2024-03-07 PROCEDURE — 82270 OCCULT BLOOD FECES: CPT | Performed by: INTERNAL MEDICINE

## 2024-03-07 PROCEDURE — 80053 COMPREHEN METABOLIC PANEL: CPT

## 2024-03-07 PROCEDURE — 87209 SMEAR COMPLEX STAIN: CPT | Performed by: INTERNAL MEDICINE

## 2024-03-07 PROCEDURE — 84439 ASSAY OF FREE THYROXINE: CPT

## 2024-03-07 PROCEDURE — 80061 LIPID PANEL: CPT

## 2024-03-07 PROCEDURE — 87086 URINE CULTURE/COLONY COUNT: CPT

## 2024-03-07 PROCEDURE — 85025 COMPLETE CBC W/AUTO DIFF WBC: CPT

## 2024-03-07 PROCEDURE — 87329 GIARDIA AG IA: CPT | Performed by: INTERNAL MEDICINE

## 2024-03-07 PROCEDURE — 87338 HPYLORI STOOL AG IA: CPT | Performed by: INTERNAL MEDICINE

## 2024-03-07 PROCEDURE — 86140 C-REACTIVE PROTEIN: CPT

## 2024-03-07 PROCEDURE — 84443 ASSAY THYROID STIM HORMONE: CPT

## 2024-03-07 PROCEDURE — 83993 ASSAY FOR CALPROTECTIN FECAL: CPT | Performed by: INTERNAL MEDICINE

## 2024-03-07 NOTE — TELEPHONE ENCOUNTER
----- Message from Hero Hernandez LPN sent at 3/4/2024 11:37 AM CST -----  Regarding: janet walters 3/12 @9  Are there any outstanding tasks in patient chart? Needs fasting labs    Is there documentation of outstanding tasks in patient chart? no    Has patient been to the ER, urgent care, or another physician since last visit?    Has patient done any blood work or x-rays since last visit?    5. PLEASE HAVE PATIENT BRING MEDICATION LIST OR BOTTLES TO EVERY OFFICE VISIT

## 2024-03-08 DIAGNOSIS — Z17.0 MALIGNANT NEOPLASM OF CENTRAL PORTION OF RIGHT BREAST IN FEMALE, ESTROGEN RECEPTOR POSITIVE: ICD-10-CM

## 2024-03-08 DIAGNOSIS — C22.0 HEPATOCELLULAR CARCINOMA: Primary | ICD-10-CM

## 2024-03-08 DIAGNOSIS — C50.111 MALIGNANT NEOPLASM OF CENTRAL PORTION OF RIGHT BREAST IN FEMALE, ESTROGEN RECEPTOR POSITIVE: ICD-10-CM

## 2024-03-09 LAB
BACTERIA UR CULT: NORMAL
CALPROTECTIN STL-MCNT: <50 MCG/G

## 2024-03-10 LAB — O+P STL MICRO: NORMAL

## 2024-03-10 RX ORDER — HEPARIN 100 UNIT/ML
500 SYRINGE INTRAVENOUS
Status: CANCELLED | OUTPATIENT
Start: 2024-03-12

## 2024-03-10 RX ORDER — SODIUM CHLORIDE 0.9 % (FLUSH) 0.9 %
10 SYRINGE (ML) INJECTION
Status: CANCELLED | OUTPATIENT
Start: 2024-03-12

## 2024-03-12 ENCOUNTER — LAB VISIT (OUTPATIENT)
Dept: LAB | Facility: HOSPITAL | Age: 69
End: 2024-03-12
Attending: INTERNAL MEDICINE
Payer: MEDICARE

## 2024-03-12 ENCOUNTER — OFFICE VISIT (OUTPATIENT)
Dept: INTERNAL MEDICINE | Facility: CLINIC | Age: 69
End: 2024-03-12
Payer: MEDICARE

## 2024-03-12 ENCOUNTER — INFUSION (OUTPATIENT)
Dept: INFUSION THERAPY | Facility: HOSPITAL | Age: 69
End: 2024-03-12
Attending: INTERNAL MEDICINE
Payer: MEDICARE

## 2024-03-12 VITALS
OXYGEN SATURATION: 98 % | RESPIRATION RATE: 18 BRPM | BODY MASS INDEX: 23.66 KG/M2 | HEART RATE: 63 BPM | DIASTOLIC BLOOD PRESSURE: 82 MMHG | SYSTOLIC BLOOD PRESSURE: 120 MMHG | WEIGHT: 142 LBS | HEIGHT: 65 IN

## 2024-03-12 VITALS — DIASTOLIC BLOOD PRESSURE: 79 MMHG | TEMPERATURE: 98 F | SYSTOLIC BLOOD PRESSURE: 123 MMHG | HEART RATE: 69 BPM

## 2024-03-12 DIAGNOSIS — C22.0 HEPATOCELLULAR CARCINOMA: Primary | ICD-10-CM

## 2024-03-12 DIAGNOSIS — Z15.09 BRCA2 GENE MUTATION POSITIVE: ICD-10-CM

## 2024-03-12 DIAGNOSIS — C22.0 HEPATOCELLULAR CARCINOMA: ICD-10-CM

## 2024-03-12 DIAGNOSIS — Z15.01 BRCA2 GENE MUTATION POSITIVE: ICD-10-CM

## 2024-03-12 DIAGNOSIS — E03.2 HYPOTHYROIDISM DUE TO DRUGS: ICD-10-CM

## 2024-03-12 DIAGNOSIS — C50.111 MALIGNANT NEOPLASM OF CENTRAL PORTION OF RIGHT BREAST IN FEMALE, ESTROGEN RECEPTOR POSITIVE: ICD-10-CM

## 2024-03-12 DIAGNOSIS — Z17.0 MALIGNANT NEOPLASM OF CENTRAL PORTION OF RIGHT BREAST IN FEMALE, ESTROGEN RECEPTOR POSITIVE: ICD-10-CM

## 2024-03-12 DIAGNOSIS — Z85.3 HISTORY OF BREAST CANCER: ICD-10-CM

## 2024-03-12 DIAGNOSIS — Z00.00 ANNUAL PHYSICAL EXAM: ICD-10-CM

## 2024-03-12 DIAGNOSIS — I70.0 AORTIC ATHEROSCLEROSIS: ICD-10-CM

## 2024-03-12 DIAGNOSIS — K74.60 HEPATIC CIRRHOSIS, UNSPECIFIED HEPATIC CIRRHOSIS TYPE, UNSPECIFIED WHETHER ASCITES PRESENT: ICD-10-CM

## 2024-03-12 DIAGNOSIS — R73.01 IFG (IMPAIRED FASTING GLUCOSE): ICD-10-CM

## 2024-03-12 DIAGNOSIS — R19.7 DIARRHEA, UNSPECIFIED TYPE: ICD-10-CM

## 2024-03-12 LAB
ALBUMIN SERPL-MCNC: 3.6 G/DL (ref 3.4–4.8)
ALBUMIN/GLOB SERPL: 1.2 RATIO (ref 1.1–2)
ALP SERPL-CCNC: 86 UNIT/L (ref 40–150)
ALT SERPL-CCNC: 24 UNIT/L (ref 0–55)
AST SERPL-CCNC: 17 UNIT/L (ref 5–34)
BASOPHILS # BLD AUTO: 0.02 X10(3)/MCL
BASOPHILS NFR BLD AUTO: 0.5 %
BILIRUB SERPL-MCNC: 0.6 MG/DL
BUN SERPL-MCNC: 10.4 MG/DL (ref 9.8–20.1)
CALCIUM SERPL-MCNC: 8.6 MG/DL (ref 8.4–10.2)
CHLORIDE SERPL-SCNC: 107 MMOL/L (ref 98–107)
CO2 SERPL-SCNC: 27 MMOL/L (ref 23–31)
CREAT SERPL-MCNC: 0.81 MG/DL (ref 0.55–1.02)
EOSINOPHIL # BLD AUTO: 0.24 X10(3)/MCL (ref 0–0.9)
EOSINOPHIL NFR BLD AUTO: 6.5 %
ERYTHROCYTE [DISTWIDTH] IN BLOOD BY AUTOMATED COUNT: 12.7 % (ref 11.5–17)
EST. AVERAGE GLUCOSE BLD GHB EST-MCNC: 131.2 MG/DL
GFR SERPLBLD CREATININE-BSD FMLA CKD-EPI: >60 MLS/MIN/1.73/M2
GLOBULIN SER-MCNC: 3.1 GM/DL (ref 2.4–3.5)
GLUCOSE SERPL-MCNC: 134 MG/DL (ref 82–115)
HBA1C MFR BLD: 6.2 %
HCT VFR BLD AUTO: 39 % (ref 37–47)
HGB BLD-MCNC: 13 G/DL (ref 12–16)
IMM GRANULOCYTES # BLD AUTO: 0 X10(3)/MCL (ref 0–0.04)
IMM GRANULOCYTES NFR BLD AUTO: 0 %
LYMPHOCYTES # BLD AUTO: 1.14 X10(3)/MCL (ref 0.6–4.6)
LYMPHOCYTES NFR BLD AUTO: 31.1 %
MCH RBC QN AUTO: 30 PG (ref 27–31)
MCHC RBC AUTO-ENTMCNC: 33.3 G/DL (ref 33–36)
MCV RBC AUTO: 90.1 FL (ref 80–94)
MONOCYTES # BLD AUTO: 0.36 X10(3)/MCL (ref 0.1–1.3)
MONOCYTES NFR BLD AUTO: 9.8 %
NEUTROPHILS # BLD AUTO: 1.91 X10(3)/MCL (ref 2.1–9.2)
NEUTROPHILS NFR BLD AUTO: 52.1 %
PLATELET # BLD AUTO: 159 X10(3)/MCL (ref 130–400)
PMV BLD AUTO: 9.1 FL (ref 7.4–10.4)
POTASSIUM SERPL-SCNC: 4 MMOL/L (ref 3.5–5.1)
PROT SERPL-MCNC: 6.7 GM/DL (ref 5.8–7.6)
RBC # BLD AUTO: 4.33 X10(6)/MCL (ref 4.2–5.4)
SODIUM SERPL-SCNC: 141 MMOL/L (ref 136–145)
TSH SERPL-ACNC: 2.35 UIU/ML (ref 0.35–4.94)
WBC # SPEC AUTO: 3.67 X10(3)/MCL (ref 4.5–11.5)

## 2024-03-12 PROCEDURE — 96413 CHEMO IV INFUSION 1 HR: CPT

## 2024-03-12 PROCEDURE — 36415 COLL VENOUS BLD VENIPUNCTURE: CPT

## 2024-03-12 PROCEDURE — 83036 HEMOGLOBIN GLYCOSYLATED A1C: CPT

## 2024-03-12 PROCEDURE — 84443 ASSAY THYROID STIM HORMONE: CPT

## 2024-03-12 PROCEDURE — 25000003 PHARM REV CODE 250: Performed by: INTERNAL MEDICINE

## 2024-03-12 PROCEDURE — 80053 COMPREHEN METABOLIC PANEL: CPT

## 2024-03-12 PROCEDURE — 85025 COMPLETE CBC W/AUTO DIFF WBC: CPT

## 2024-03-12 PROCEDURE — G0439 PPPS, SUBSEQ VISIT: HCPCS | Mod: ,,, | Performed by: INTERNAL MEDICINE

## 2024-03-12 PROCEDURE — 63600175 PHARM REV CODE 636 W HCPCS: Mod: JZ,JG | Performed by: INTERNAL MEDICINE

## 2024-03-12 RX ORDER — HEPARIN 100 UNIT/ML
500 SYRINGE INTRAVENOUS
Status: DISCONTINUED | OUTPATIENT
Start: 2024-03-12 | End: 2024-03-12 | Stop reason: HOSPADM

## 2024-03-12 RX ORDER — SODIUM CHLORIDE 0.9 % (FLUSH) 0.9 %
10 SYRINGE (ML) INJECTION
Status: DISCONTINUED | OUTPATIENT
Start: 2024-03-12 | End: 2024-03-12 | Stop reason: HOSPADM

## 2024-03-12 RX ADMIN — SODIUM CHLORIDE 480 MG: 9 INJECTION, SOLUTION INTRAVENOUS at 03:03

## 2024-03-12 NOTE — PROGRESS NOTES
Patient ID: Maci Lewis is a 68 y.o. female.    Chief Complaint: Medicare AWV (Labs 3/7)      Patient Care Team:  Hussein Patel II, MD as PCP - General (Internal Medicine)     HPI:   Patient presents here today for above reason.     Ms. Lux is a 68-year-old female presents today annual visit.  Actually doing great history of hepatocellular carcinoma and history of intraductal carcinoma of the breast doing well and considered in remission right now.  She is followed by Hematology-Oncology every 4 months with lab work.  I doing great age-appropriate screening up-to-date labs are all within normal limits cholesterol slightly borderline.  Here for annual. Recent c-scope, neg.   Were done here for review her fasting blood sugars were slightly elevated denies any polyuria polydipsia RA any other constitutional symptoms diabetes.  Closely monitored by Hematology-Oncology for her Opdivo.  Is having some mild colitis she is trying to get off of Effexor last night was her 1st night being off of it.  She has been on Effexor for hot flashes.  Otherwise doing well screening up-to-date here for follow-up.    The patient's Health Maintenance was reviewed and the following appears to be due at this time:   Health Maintenance Due   Topic Date Due    Hepatitis C Screening  Never done    Shingles Vaccine (1 of 2) Never done    RSV Vaccine (Age 60+ and Pregnant patients) (1 - 1-dose 60+ series) Never done    DEXA Scan  03/01/2024        Past Medical History:  Past Medical History:   Diagnosis Date    Arthritis     BRCA2 gene mutation positive 10/18/2018    Breast cancer     Cancer     Hepatocellular    History of breast cancer 10/18/2018     Past Surgical History:   Procedure Laterality Date    BREAST LUMPECTOMY Right     BREAST RECONSTRUCTION      Reconstruction twice    CHOLECYSTECTOMY      HEEL SPUR SURGERY Left     LIVER RESECTION      2016 and 2017     MASTECTOMY      double 2014    OOPHORECTOMY       hysterectomy/ BSO    TONSILLECTOMY  1959    Y-90 embolization       Review of patient's allergies indicates:  No Known Allergies  Current Outpatient Medications on File Prior to Visit   Medication Sig Dispense Refill    biotin 1 mg tablet Take 5,000 mcg by mouth once daily.       calcium carbonate (OS-MENDEZ) 600 mg calcium (1,500 mg) Tab Take by mouth.      calcium-vitamin D (OSCAL) 250 (625)-125 mg-unit per tablet Take 1 tablet by mouth once daily.      INTRAROSA 6.5 mg Inst Place 1 tablet vaginally once daily.      meloxicam (MOBIC) 15 MG tablet Take 1 tablet (15 mg total) by mouth once daily. As needed 30 tablet 2    multivitamin (THERAGRAN) per tablet Take 1 tablet by mouth once daily.      omega-3 fatty acids fish oil 1,050-1,200 mg Cap capsule Take by mouth.      omeprazole (PRILOSEC) 40 MG capsule Take 40 mg by mouth once daily.      venlafaxine (EFFEXOR-XR) 37.5 MG 24 hr capsule Take 37.5 mg by mouth every evening.      XIIDRA 5 % Dpet Place 1 drop into both eyes 2 (two) times daily.       No current facility-administered medications on file prior to visit.     Social History     Socioeconomic History    Marital status:    Occupational History    Occupation: Retired    Tobacco Use    Smoking status: Never    Smokeless tobacco: Never   Substance and Sexual Activity    Alcohol use: Not Currently     Comment: social drinking 3-5 drinks weekly    Drug use: No     Comment: patient denies    Sexual activity: Yes     Partners: Male     Family History   Problem Relation Age of Onset    Heart disease Father     Kidney cancer Father         RCC    Kidney cancer Maternal Grandfather     Kidney cancer Paternal Uncle         RCC    Breast cancer Maternal Aunt        ROS:   Review of Systems  Constitutional: No weight gain, No fever, No chills, No fatigue.   Eyes: No blurring, No visual disturbances.   Ear/Nose/Mouth/Throat: No decreased hearing, No ear pain, No nasal congestion, No sore throat.    Respiratory: No shortness of breath, No cough, No wheezing.   Cardiovascular: No chest pain, No palpitations, No peripheral edema.   Gastrointestinal: No nausea, No vomiting, No diarrhea, No constipation, No abdominal pain.   Genitourinary: No dysuria, No hematuria.   Hematology/Lymphatics: No bruising tendency, No bleeding tendency, No swollen lymph glands.   Endocrine: No excessive thirst, No polyuria, No excessive hunger.   Musculoskeletal: No joint pain, No muscle pain, No decreased range of motion.   Integumentary: No rash, No pruritus.   Neurologic: No abnormal balance, No confusion, No headache.   Psychiatric: No anxiety, No depression, Not suicidal, No hallucinations.        Patient Reported Health Risk Assessment  What is your age?: 65-69  Are you male or female?: Female  During the past four weeks, how much have you been bothered by emotional problems such as feeling anxious, depressed, irritable, sad, or downhearted and blue?: Not at all  During the past five weeks, has your physical and/or emotional health limited your social activities with family, friends, neighbors, or groups?: Not at all  During the past four weeks, how much bodily pain have you generally had?: No pain  During the past four weeks, was someone available to help if you needed and wanted help?: Yes, as much as I wanted  During the past four weeks, what was the hardest physical activity you could do for at least two minutes?: Moderate  Can you get to places out of walking distance without help?  (For example, can you travel alone on buses or taxis, or drive your own car?): Yes  Can you go shopping for groceries or clothes without someone's help?: Yes  Can you prepare your own meals?: Yes  Can you do your own housework without help?: Yes  Because of any health problems, do you need the help of another person with your personal care needs such as eating, bathing, dressing, or getting around the house?: No  Can you handle your own money  "without help?: Yes  During the past four weeks, how would you rate your health in general?: Excellent  How have things been going for you during the past four weeks?: Very well  Are you having difficulties driving your car?: No  Do you always fasten your seat belt when you are in a car?: Yes, usually  How often in the past four weeks have you been bothered by falling or dizzy when standing up?: Never  How often in the past four weeks have you been bothered by sexual problems?: Never  How often in the past four weeks have you been bothered by trouble eating well?: Never  How often in the past four weeks have you been bothered by teeth or denture problems?: Never  How often in the past four weeks have you been bothered with problems using the telephone?: Never  How often in the past four weeks have you been bothered by tiredness or fatigue?: Never  Have you fallen two or more times in the past year?: No  Are you afraid of falling?: No  Are you a smoker?: No  During the past four weeks, how many drinks of wine, beer, or other alcoholic beverages did you have?: No alcohol at all  Do you exercise for about 20 minutes three or more days a week?: Yes, some of the time  Have you been given any information to help you with hazards in your house that might hurt you?: Yes  Have you been given any information to help you with keeping track of your medications?: Yes  How often do you have trouble taking medicines the way you've been told to take them?: I always take them as prescribed  How confident are you that you can control and manage most of your health problems?: Very confident  What is your race? (Check all that apply.):     Vitals/PE:   /82 (BP Location: Left arm, Patient Position: Sitting)   Pulse 63   Resp 18   Ht 5' 5" (1.651 m)   Wt 64.4 kg (142 lb)   SpO2 98%   BMI 23.63 kg/m²   Physical Exam    General: Alert and oriented, No acute distress.   Eye: Normal conjunctiva without exudate.  HENMT: " "Normocephalic/AT, Normal hearing, Oral mucosa is moist and pink   Neck: No goiter visualized.   Respiratory: Lungs CTAB, Respirations are non-labored, Breath sounds are equal, Symmetrical chest wall expansion.  Cardiovascular: Normal rate, Regular rhythm, No murmur, No edema.   Gastrointestinal: Non-distended.   Genitourinary: Deferred.  Musculoskeletal: Normal ROM, Normal gait, No deformities or amputations.  Integumentary: Warm, Dry, Intact. No diaphoresis, or flushing.  Neurologic: No focal deficits, Cranial Nerves II-XII are grossly intact.   Psychiatric: Cooperative, Appropriate mood & affect, Normal judgment, Non-suicidal.             No data to display                  3/12/2024     9:00 AM 3/6/2024     1:00 PM 2/14/2024     3:00 PM 1/16/2024     3:00 PM 12/19/2023     3:00 PM 11/6/2023     1:00 PM 10/24/2023     3:00 PM   Fall Risk Assessment - Outpatient   Mobility Status Ambulatory Ambulatory Ambulatory Ambulatory Ambulatory Ambulatory Ambulatory   Number of falls 0 0 0 0 0 0 0   Identified as fall risk False False False False False False False           Depression Screening  Over the past two weeks, has the patient felt down, depressed, or hopeless?: No  Over the past two weeks, has the patient felt little interest or pleasure in doing things?: No  Functional Ability/Safety Screening  Was the patient's timed Up & Go test unsteady or longer than 30 seconds?: No  Does the patient need help with phone, transportation, shopping, preparing meals, housework, laundry, meds, or managing money?: No  Does the patient's home have rugs in the hallway, lack grab bars in the bathroom, lack handrails on the stairs or have poor lighting?: No  Have you noticed any hearing difficulties?: No  A "Yes" response to any of the above questions should trigger further investigation.: 0  Cognitive Function (Assessed through direct observation with due consideration of information obtained by way of patient reports and/or concerns " "raised by family, friends, caretakers, or others)    Does the patient repeat questions/statements in the same day?: No  Does the patient have trouble remembering the date, year, and time?: No  Does the patient have difficulty managing finances?: No  Does the patient have a decreased sense of direction?: No  A "Yes" response to any of the above questions could indicate mild cognitive impairment and should trigger further investigation.: 0    Assessment/Plan:       1. Hepatocellular carcinoma    2. Aortic atherosclerosis    3. BRCA2 gene mutation positive    4. History of breast cancer    5. 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 Diarrhea, unspecified type    7. Hepatic cirrhosis, unspecified hepatic cirrhosis type, unspecified whether ascites present    8. Annual physical exam         Plan:  One will add A1c to today's labs.  Her fasting blood sugars were slightly elevated 123.  Okay to continue weaning Effexor she continues with vasomotor symptoms may try Vezoah  Continue with current therapy age-appropriate screening is up-to-date continue with close monitoring by Hematology-Oncology    Education and counseling done face to face regarding medical conditions and plan. Contact office if new symptoms develop. Should any symptoms ever significantly worsen seek emergency medical attention/go to ER. Follow up at least yearly for wellness or sooner PRN. Nurse to call patient with any results. The patient is receptive, expresses understanding and is agreeable to plan. All questions have been answered.    No follow-ups on file.      Medicare Annual Wellness and Personalized Prevention Plan:   Fall Risk + Home Safety + Hearing Impairment + Depression Screen + Cognitive Impairment Screen + Health Risk Assessment all reviewed.    Advance Care Planning   I attest to discussing Advance Care Planning with patient and/or family member.  Education was provided including the importance of the Health Care Power of , Advance " Directives, and/or [place] documentation.  The patient expressed understanding to the importance of this information and discussion.  Length of ACP conversation in minutes:          Opioid Screening: Patient medication list reviewed, patient is not taking prescription opioids. Patient is not using additional opioids than prescribed. Patient is at low risk of substance abuse based on this opioid use history.

## 2024-04-03 ENCOUNTER — PATIENT MESSAGE (OUTPATIENT)
Dept: HEMATOLOGY/ONCOLOGY | Facility: CLINIC | Age: 69
End: 2024-04-03
Payer: MEDICARE

## 2024-04-04 ENCOUNTER — OFFICE VISIT (OUTPATIENT)
Dept: HEMATOLOGY/ONCOLOGY | Facility: CLINIC | Age: 69
End: 2024-04-04
Payer: MEDICARE

## 2024-04-04 VITALS
RESPIRATION RATE: 15 BRPM | OXYGEN SATURATION: 98 % | HEART RATE: 71 BPM | HEIGHT: 65 IN | BODY MASS INDEX: 23.01 KG/M2 | WEIGHT: 138.13 LBS | TEMPERATURE: 98 F | SYSTOLIC BLOOD PRESSURE: 117 MMHG | DIASTOLIC BLOOD PRESSURE: 85 MMHG

## 2024-04-04 DIAGNOSIS — C22.0 HEPATOCELLULAR CARCINOMA: Primary | ICD-10-CM

## 2024-04-04 DIAGNOSIS — Z17.0 MALIGNANT NEOPLASM OF CENTRAL PORTION OF RIGHT BREAST IN FEMALE, ESTROGEN RECEPTOR POSITIVE: ICD-10-CM

## 2024-04-04 DIAGNOSIS — E03.2 HYPOTHYROIDISM DUE TO DRUGS: ICD-10-CM

## 2024-04-04 DIAGNOSIS — L27.0 DRUG RASH: ICD-10-CM

## 2024-04-04 DIAGNOSIS — K52.9 COLITIS: ICD-10-CM

## 2024-04-04 DIAGNOSIS — C50.111 MALIGNANT NEOPLASM OF CENTRAL PORTION OF RIGHT BREAST IN FEMALE, ESTROGEN RECEPTOR POSITIVE: ICD-10-CM

## 2024-04-04 PROCEDURE — 99214 OFFICE O/P EST MOD 30 MIN: CPT | Mod: S$PBB,,, | Performed by: NURSE PRACTITIONER

## 2024-04-04 PROCEDURE — 99999 PR PBB SHADOW E&M-EST. PATIENT-LVL IV: CPT | Mod: PBBFAC,,, | Performed by: NURSE PRACTITIONER

## 2024-04-04 PROCEDURE — 99214 OFFICE O/P EST MOD 30 MIN: CPT | Mod: PBBFAC | Performed by: NURSE PRACTITIONER

## 2024-04-04 RX ORDER — BUDESONIDE 3 MG/1
9 CAPSULE, COATED PELLETS ORAL
COMMUNITY
Start: 2024-04-02

## 2024-04-04 NOTE — PROGRESS NOTES
"Subjective:       Patient ID: Maci Lewis is a 68 y.o. female.    Chief Complaint:  "Ongoing bowel issues"    Diagnosis: Recurrent multifocal hepatocellular carcinoma                    Stage IIA right breast cancer 10/13 (T2 N0 M0), 2.4 cm, grade III, ER/MN neg, Her-2 neg                    Positive BRCA2 mutation                    S/p bilateral mastectomies and BSO                     + COVID-19 vaccinated    Treatment History  AC x4 (2/14) -->weekly Taxol x12 (6//14) ---> XRT x 14 days ---> bilateral mastectomies  Partial right hepatectomy 7/16  Right hepatic segmentectomy 11/17  Y-90 RHA 6/19/18; Y-90 LHA 8/14/18  Nexavar 8/2-8/16/18    Current Treatment: Nivolumab Q 4 weeks (started 10/25/18)    Clinical History:  Patient underwent right breast lumpectomy and SLND 10/28/13. Final pathology showed a 2.5 cm grade 3 infiltrating ductal carcinoma with clear margins, no lymphovascular invasion and 6 negative LN's.  She completed adjuvant chemotherapy followed by radiation as outlined above.  Genetic testing was positive for a BRCA2 gene mutation.  She underwent bilateral mastectomies and ROCIO reconstruction 12/8/14    She presented to the hospital 7/24/16 with acute right neck and shoulder pain, followed by abdominal pain and distention.  She was anemic and hypotensive. CT of the abdomen showed 2 complex heterogeneous masses along the inferior aspect of the right hepatic lobe.  The larger more peripheral mass showed evidence of active hemorrhage.  There was a large subcapsular hematoma with free fluid in the pelvis.  Attempted embolization by interventional radiology was unsuccessful.  She was taken to the OR for exploratory laparotomy.  A large intra-abdominal blood collection was evacuated.  There was tumor in hepatic segment 6 with necrosis and rupture with some minimal oozing but no active bleeding.  Partial right hepatectomy was performed.  Intraoperative ultrasound of the liver showed no other " suspicious lesions.  Alpha-fetoprotein level was normal.  Final pathology showed a 5.3 cm intermediate grade hepatocellular carcinoma with focal hemorrhagic infarction, fibrosis and hepatic capsular rupture.  Margins of resection were clear. CT A/P 10/14/16 showed postoperative changes in the lateral inferior margin of the right hepatic lobe and a small fluid collection. There was no abnormal contrast enhancement or evidence of suspicious liver lesions. Repeat scans 2/14/17 showed stable postoperative changes with improved small perihepatic fluid collection.    CT of the abdomen and pelvis 7/24/17 showed postsurgical changes along the inferior right hepatic lobe with a new area of hypoattenuation inferiorly along the suture line measuring up to about 12 mm. This finding was not associated with contrast enhancement to suggest recurrence. Short-term interval follow-up was recommended in 3 months by Radiology. There were small stable hepatic cysts. Follow-up CT scan 10/25/17 showed significant increase of the hypodense lesion adjacent to the suture line in the right hepatic lobe to 4.7 x 4.2 cm. There was minimal low-density change within the lesion suggesting central necrosis and subtle contrast enhancement. Alpha-fetoprotein level was normal. CT scan of the chest 11/3/17 showed no evidence of metastatic disease. There was a small hypodense nodule in the right thyroid. She was referred to surgical oncology and underwent resection of segment 6 of the liver 11/24/17 along with cholecystectomy. Final pathology showed a moderately differentiated hepatocellular carcinoma measuring 6.7 cm in greatest dimension. There was no evidence of vascular or perineural invasion. All resection margins were clear.    Surveillance CT scan of the abdomen and pelvis 5/25/18 showed significant progression and recurrence of her disease with a dominant mass in segment 5 of the right hepatic lobe measuring 6.7 cm and a new mass in the right  lobe measuring 3.7 cm in segment 7 and also in segment 8 measuring 1.6 cm.  A new lesion in the left lobe of the liver measured 4.2 cm.  Alpha-fetoprotein level was normal at 4.5 ng/mL.  CT-guided biopsy 6/5/18 confirmed hepatocellular carcinoma, well-differentiated.     She underwent Y-90 radioembolization to the right hepatic artery to a total dose of 335 mGy 6/19/18.  She was evaluated at [facility] 6/20/18 by Dr. Gigi Perkins for consideration of liver transplant.  Based on her disease burden, she did not meet criteria for liver transplantation.  Recommendations were to attempt to downstage her tumor burden through radioembolization and potentially revisit transplantation in the future depending on her response.  Her case was presented at interdisciplinary conference, and these recommendations were supported, along with addition of Nexavar for systemic treatment as tolerated.  Nexavar was initiated 8/2/18. She underwent Y-90 radioembolization of the left hepatic artery 8/14/18 to a total dose of 256 mGy.  Nexavar was discontinued secondary to significant toxicity including a severe rash, alopecia, anemia and transaminitis.    She was seen at [facility] 10/18/18 by Dr. Christopher for a second treatment opinion. She was not eligible for any current clinical trials at their institution. Her pathology was requested and submitted for next generation sequencing to determine if she had any targetable mutations. We discussed further treatment and both Dr. Christopher and I agreed that her best option for further therapy would be treatment with Nivolumab given the uncharacteristic nature and behavior of her multifocal hepatocellular carcinoma.  Treatment was initiated 10/25/18.    Restaging CT of the abdomen 1/15/19 after 3 months of therapy showed significant disease regression.  The reference left hepatic lobe mass measured 4.8 x 2.0 cm (previously 10.1 x 6.9 cm), mass in segment IV was 4.3 x 2.0 cm (previous 7.6 x 7.3 cm) and a  posterior right hepatic lobe mass was 2.7 x 2.1 cm (previous 5.2 x 4.3 cm). Her transaminases completely normalized and her anemia resolved. CT scans of the chest, abdomen and pelvis 4/4/19 showed continued decrease in size of the multiple hepatic lesions. Representative left hepatic lobe mass measured 3.6 cm (previous 4.8 cm). There were no new lesions. Spleen was unremarkable. There was a 2 mm noncalcified nodule in the left upper lobe. There was no suspicious mediastinal or hilar adenopathy. CT scans of the chest, abdomen and pelvis 4/2/19 showed continued interval decrease of the hepatic metastatic lesions and a 2 mm noncalcified nodule in the left upper lobe with no new sites of disease. Serial CT scans have shown no significant disease progression.     Colonoscopy on 10/11/22 showing patchy, discontinuous erythema with no bleeding.  Pathology showed changes consistent with lymphocytic colitis involving the left and right areas of the colon that were sampled.    Interval History  Mrs. Lewis is here today by herself for an unscheduled office visit.  At her last visit, she reported worsening soft, yellow stool and increased gas, with weight loss.  She underwent a GI evaluation and stool studies, which were unremarkable.  Given her previous colonoscopy results, symptoms were felt due to immune mediated colitis and she was prescribed Budesonide.  She took one dose with improvement in her symptoms.  She has has experienced pruritus with papular skin lesions, and oral mucositis.  She has completed over 5 years of treatment with Nivolumab.  She is scheduled for CT scans in late may.        Review of Systems   Constitutional:  Positive for unexpected weight change. Negative for appetite change, fatigue and fever.   HENT:  Negative for mouth sores, sore throat and trouble swallowing.         Tender gums   Eyes: Negative.  Negative for visual disturbance.   Respiratory:  Negative for cough and shortness of breath.   "  Cardiovascular:  Negative for chest pain, palpitations and leg swelling.   Gastrointestinal:  Positive for change in bowel habit and reflux. Negative for abdominal distention, abdominal pain, constipation, diarrhea, nausea and vomiting.   Genitourinary:  Negative for dysuria, frequency and urgency.   Musculoskeletal:  Positive for arthralgias (Mild). Negative for back pain.   Integumentary:  Negative for color change, rash and mole/lesion.        Itching, no rash   Neurological:  Negative for dizziness, weakness, numbness and headaches.   Hematological:  Negative for adenopathy. Does not bruise/bleed easily.   Psychiatric/Behavioral: Negative.  The patient is not nervous/anxious.          PMHx:  Arthritis, breast cancer  PSHx:  Tonsils, cholecystectomy, left foot surgery, hysterectomy/BSO, right breast lumpectomy, bilateral mastectomies with reconstruction, HCC resection x2, Y-90 embolization  SH:  Lifetime nonsmoker, occasional social alcohol use. Lives in [place] with her , retired .  FH:  Father, paternal uncle and maternal grandfather had kidney cancer. A great aunt had breast cancer.     Objective:        /85   Pulse 71   Temp 98.2 °F (36.8 °C)   Resp 15   Ht 5' 5" (1.651 m)   Wt 62.6 kg (138 lb 1.6 oz)   SpO2 98%   BMI 22.98 kg/m²    Physical Exam  Constitutional:       Comments: Well-developed white female in NAD   HENT:      Head: Normocephalic.      Mouth/Throat:      Mouth: Mucous membranes are moist.      Pharynx: Oropharynx is clear. No posterior oropharyngeal erythema.   Eyes:      Extraocular Movements: Extraocular movements intact.      Conjunctiva/sclera: Conjunctivae normal.      Pupils: Pupils are equal, round, and reactive to light.   Cardiovascular:      Rate and Rhythm: Normal rate and regular rhythm.      Heart sounds: No murmur heard.  Pulmonary:      Comments: Lungs clear to auscultation  Abdominal:      General: Bowel sounds are normal. There is no " distension.      Palpations: Abdomen is soft.      Tenderness: There is no abdominal tenderness.      Comments: Well-healed RUQ incision.  No palpable masses or organomegaly.   Musculoskeletal:         General: No swelling or tenderness. Normal range of motion.      Cervical back: Neck supple. No tenderness.   Skin:     General: Skin is warm and dry.      Findings: No rash.      Comments: Few excoriated areas on chest, back    Neurological:      General: No focal deficit present.      Mental Status: She is oriented to person, place, and time.      Cranial Nerves: No cranial nerve deficit.      Motor: No weakness.       ECOG SCORE              LABORATORY  No new laboratory for review    Assessment:   Recurrent multifocal hepatocellular carcinoma  Stage IIA triple negative breast cancer 10/13 - [facility]  Positive BRCA2 mutation  Immune mediated colitis, dermatitis, and mucositis    Plan:   Patient examined and plan of care formulated with .  Current symptoms are compatible with immune mediated toxicity.  Hold Nivolumab.  Disease has been well controlled for many years.  Budesonide 3-9 mg daily as needed.  Continue Peridex mouthwash.  Topical moisturizer, hydrocortisone and benadryl PRN.  Oral antihistamines are poorly tolerated.  Symptoms should quickly improve off treatment.  Short term follow-up in one month.  CT scans ordered for late May.  Patient in agreement.  All questions answered to the satisfaction of the patient.    VICENTE BARCLAY, FNP-C  Hematology/Oncology  Can[facility] Center [facility]     All questions answered to the satisfaction of the patient.    VICENTE Potter, HALEYP-C    Other Physicians  Dr. Arthur Patel ()  Dr. Richard Perera

## 2024-05-03 ENCOUNTER — OFFICE VISIT (OUTPATIENT)
Dept: HEMATOLOGY/ONCOLOGY | Facility: CLINIC | Age: 69
End: 2024-05-03
Payer: MEDICARE

## 2024-05-03 VITALS
WEIGHT: 134.31 LBS | BODY MASS INDEX: 22.38 KG/M2 | HEART RATE: 60 BPM | SYSTOLIC BLOOD PRESSURE: 142 MMHG | DIASTOLIC BLOOD PRESSURE: 88 MMHG | HEIGHT: 65 IN | OXYGEN SATURATION: 99 % | TEMPERATURE: 98 F | RESPIRATION RATE: 15 BRPM

## 2024-05-03 DIAGNOSIS — C22.0 HEPATOCELLULAR CARCINOMA: Primary | ICD-10-CM

## 2024-05-03 DIAGNOSIS — K52.9 COLITIS: ICD-10-CM

## 2024-05-03 PROCEDURE — 99214 OFFICE O/P EST MOD 30 MIN: CPT | Mod: S$PBB,,, | Performed by: NURSE PRACTITIONER

## 2024-05-03 PROCEDURE — 99214 OFFICE O/P EST MOD 30 MIN: CPT | Mod: PBBFAC | Performed by: NURSE PRACTITIONER

## 2024-05-03 PROCEDURE — 99999 PR PBB SHADOW E&M-EST. PATIENT-LVL IV: CPT | Mod: PBBFAC,,, | Performed by: NURSE PRACTITIONER

## 2024-05-03 RX ORDER — TACROLIMUS 1 MG/1
CAPSULE ORAL
COMMUNITY
Start: 2024-04-16

## 2024-05-03 NOTE — PROGRESS NOTES
"Subjective:       Patient ID: Maci Lewis is a 68 y.o. female.    Chief Complaint:  "Still having stomach issues"    Diagnosis: Recurrent multifocal hepatocellular carcinoma                    Stage IIA right breast cancer 10/13 (T2 N0 M0), 2.4 cm, grade III, ER/WV neg, Her-2 neg                    Positive BRCA2 mutation                    S/p bilateral mastectomies and BSO                     + COVID-19 vaccinated    Treatment History  AC x4 (2/14) -->weekly Taxol x12 (6//14) ---> XRT x 14 days ---> bilateral mastectomies  Partial right hepatectomy 7/16  Right hepatic segmentectomy 11/17  Y-90 RHA 6/19/18; Y-90 LHA 8/14/18  Nexavar 8/2-8/16/18    Current Treatment: Nivolumab Q 4 weeks (started 10/25/18) ON HOLD    Clinical History:  Patient underwent right breast lumpectomy and SLND 10/28/13. Final pathology showed a 2.5 cm grade 3 infiltrating ductal carcinoma with clear margins, no lymphovascular invasion and 6 negative LN's.  She completed adjuvant chemotherapy followed by radiation as outlined above.  Genetic testing was positive for a BRCA2 gene mutation.  She underwent bilateral mastectomies and ROCIO reconstruction 12/8/14    She presented to the hospital 7/24/16 with acute right neck and shoulder pain, followed by abdominal pain and distention.  She was anemic and hypotensive. CT of the abdomen showed 2 complex heterogeneous masses along the inferior aspect of the right hepatic lobe.  The larger more peripheral mass showed evidence of active hemorrhage.  There was a large subcapsular hematoma with free fluid in the pelvis.  Attempted embolization by interventional radiology was unsuccessful.  She was taken to the OR for exploratory laparotomy.  A large intra-abdominal blood collection was evacuated.  There was tumor in hepatic segment 6 with necrosis and rupture with some minimal oozing but no active bleeding.  Partial right hepatectomy was performed.  Intraoperative ultrasound of the liver showed " no other suspicious lesions.  Alpha-fetoprotein level was normal.  Final pathology showed a 5.3 cm intermediate grade hepatocellular carcinoma with focal hemorrhagic infarction, fibrosis and hepatic capsular rupture.  Margins of resection were clear. CT A/P 10/14/16 showed postoperative changes in the lateral inferior margin of the right hepatic lobe and a small fluid collection. There was no abnormal contrast enhancement or evidence of suspicious liver lesions. Repeat scans 2/14/17 showed stable postoperative changes with improved small perihepatic fluid collection.    CT of the abdomen and pelvis 7/24/17 showed postsurgical changes along the inferior right hepatic lobe with a new area of hypoattenuation inferiorly along the suture line measuring up to about 12 mm. This finding was not associated with contrast enhancement to suggest recurrence. Short-term interval follow-up was recommended in 3 months by Radiology. There were small stable hepatic cysts. Follow-up CT scan 10/25/17 showed significant increase of the hypodense lesion adjacent to the suture line in the right hepatic lobe to 4.7 x 4.2 cm. There was minimal low-density change within the lesion suggesting central necrosis and subtle contrast enhancement. Alpha-fetoprotein level was normal. CT scan of the chest 11/3/17 showed no evidence of metastatic disease. There was a small hypodense nodule in the right thyroid. She was referred to surgical oncology and underwent resection of segment 6 of the liver 11/24/17 along with cholecystectomy. Final pathology showed a moderately differentiated hepatocellular carcinoma measuring 6.7 cm in greatest dimension. There was no evidence of vascular or perineural invasion. All resection margins were clear.    Surveillance CT scan of the abdomen and pelvis 5/25/18 showed significant progression and recurrence of her disease with a dominant mass in segment 5 of the right hepatic lobe measuring 6.7 cm and a new mass in the  right lobe measuring 3.7 cm in segment 7 and also in segment 8 measuring 1.6 cm.  A new lesion in the left lobe of the liver measured 4.2 cm.  Alpha-fetoprotein level was normal at 4.5 ng/mL.  CT-guided biopsy 6/5/18 confirmed hepatocellular carcinoma, well-differentiated.     She underwent Y-90 radioembolization to the right hepatic artery to a total dose of 335 mGy 6/19/18.  She was evaluated at [facility] 6/20/18 by Dr. Gigi Perkins for consideration of liver transplant.  Based on her disease burden, she did not meet criteria for liver transplantation.  Recommendations were to attempt to downstage her tumor burden through radioembolization and potentially revisit transplantation in the future depending on her response.  Her case was presented at interdisciplinary conference, and these recommendations were supported, along with addition of Nexavar for systemic treatment as tolerated.  Nexavar was initiated 8/2/18. She underwent Y-90 radioembolization of the left hepatic artery 8/14/18 to a total dose of 256 mGy.  Nexavar was discontinued secondary to significant toxicity including a severe rash, alopecia, anemia and transaminitis.    She was seen at [facility] 10/18/18 by Dr. Christopher for a second treatment opinion. She was not eligible for any current clinical trials at their institution. Her pathology was requested and submitted for next generation sequencing to determine if she had any targetable mutations. We discussed further treatment and both Dr. Christopher and I agreed that her best option for further therapy would be treatment with Nivolumab given the uncharacteristic nature and behavior of her multifocal hepatocellular carcinoma.  Treatment was initiated 10/25/18.    Restaging CT of the abdomen 1/15/19 after 3 months of therapy showed significant disease regression.  The reference left hepatic lobe mass measured 4.8 x 2.0 cm (previously 10.1 x 6.9 cm), mass in segment IV was 4.3 x 2.0 cm (previous 7.6 x 7.3 cm) and a  posterior right hepatic lobe mass was 2.7 x 2.1 cm (previous 5.2 x 4.3 cm). Her transaminases completely normalized and her anemia resolved. CT scans of the chest, abdomen and pelvis 4/4/19 showed continued decrease in size of the multiple hepatic lesions. Representative left hepatic lobe mass measured 3.6 cm (previous 4.8 cm). There were no new lesions. Spleen was unremarkable. There was a 2 mm noncalcified nodule in the left upper lobe. There was no suspicious mediastinal or hilar adenopathy. CT scans of the chest, abdomen and pelvis 4/2/19 showed continued interval decrease of the hepatic metastatic lesions and a 2 mm noncalcified nodule in the left upper lobe with no new sites of disease. Serial CT scans have shown no significant disease progression.     Colonoscopy on 10/11/22 showing patchy, discontinuous erythema with no bleeding.  Pathology showed changes consistent with lymphocytic colitis involving the left and right areas of the colon that were sampled.    She developed increased gas, intermittent diarrhea, and weight loss.  She was also having pruritus and tenderness of the oral mucosa.  Symptoms were felt to be immune mediated side effects from the Nivolumab.  Treatment was held and she was started on Budesonide.    Interval History  Mrs. Lewis is here today by herself for a one month visit.  She just returned from a  vacation.  She had a nice time.  She was able to manage her gas and diarrhea well for the most part.  She is taking Budesonide 9 mg daily daily.  She is not using imodium or making any dietary modifications.  Although her symptoms are persisting, they are better controlled on the Budesonide.  Her pruritus has resolved.  She was prescribed oral tacrolimus rinse and her mucositis has improved.      Review of Systems   Constitutional:  Positive for unexpected weight change. Negative for appetite change, fatigue and fever.   HENT:  Negative for mouth sores, sore throat and trouble  "swallowing.         Tender gums   Eyes: Negative.  Negative for visual disturbance.   Respiratory:  Negative for cough and shortness of breath.    Cardiovascular:  Negative for chest pain, palpitations and leg swelling.   Gastrointestinal:  Positive for change in bowel habit and reflux. Negative for abdominal distention, abdominal pain, constipation, diarrhea, nausea and vomiting.   Genitourinary:  Negative for dysuria, frequency and urgency.   Musculoskeletal:  Positive for arthralgias (Mild). Negative for back pain.   Integumentary:  Negative for color change, rash and mole/lesion.   Neurological:  Negative for dizziness, weakness, numbness and headaches.   Hematological:  Negative for adenopathy. Does not bruise/bleed easily.   Psychiatric/Behavioral: Negative.  The patient is not nervous/anxious.          PMHx:  Arthritis, breast cancer  PSHx:  Tonsils, cholecystectomy, left foot surgery, hysterectomy/BSO, right breast lumpectomy, bilateral mastectomies with reconstruction, HCC resection x2, Y-90 embolization  SH:  Lifetime nonsmoker, occasional social alcohol use. Lives in [place] with her , retired .  FH:  Father, paternal uncle and maternal grandfather had kidney cancer. A great aunt had breast cancer.     Objective:        BP (!) 142/88   Pulse 60   Temp 98.2 °F (36.8 °C)   Resp 15   Ht 5' 5" (1.651 m)   Wt 60.9 kg (134 lb 4.8 oz)   SpO2 99%   BMI 22.35 kg/m²    Physical Exam  Constitutional:       Comments: Well-developed white female in NAD   HENT:      Head: Normocephalic.      Mouth/Throat:      Mouth: Mucous membranes are moist.      Pharynx: Oropharynx is clear. No posterior oropharyngeal erythema.   Eyes:      Extraocular Movements: Extraocular movements intact.      Conjunctiva/sclera: Conjunctivae normal.      Pupils: Pupils are equal, round, and reactive to light.   Cardiovascular:      Rate and Rhythm: Normal rate and regular rhythm.      Heart sounds: No murmur " heard.  Pulmonary:      Comments: Lungs clear to auscultation  Abdominal:      General: Bowel sounds are normal. There is no distension.      Palpations: Abdomen is soft.      Tenderness: There is no abdominal tenderness.      Comments: Well-healed RUQ incision.  No palpable masses or organomegaly.   Musculoskeletal:         General: No swelling or tenderness. Normal range of motion.      Cervical back: Neck supple. No tenderness.   Skin:     General: Skin is warm and dry.      Findings: No rash.      Comments: Few excoriated areas on chest, back    Neurological:      General: No focal deficit present.      Mental Status: She is oriented to person, place, and time.      Cranial Nerves: No cranial nerve deficit.      Motor: No weakness.       ECOG SCORE    0 - Fully active-able to carry on all pre-disease performance without restriction          LABORATORY  No new laboratory for review    Assessment:   Recurrent multifocal hepatocellular carcinoma  Stage IIA triple negative breast cancer 10/13 - KASSI  Positive BRCA2 mutation  Immune mediated colitis, dermatitis, and mucositis    Plan:   Continue to hold Nivolumab secondary to immune mediated side effects.  Continue Budesonide 9 mg daily.  Add probiotic.  Avoid dietary triggers.    Patient will follow-up with GI for any further recommendations regarding management of her colitis.  She will be referred for surveillance CT scans of the chest, abdomen, and pelvis in 3-4 weeks.  RTC after above for results review.  Patient in agreement.  All questions answered to the satisfaction of the patient.    VICENTE BARCLAY, FNP-C  [facility] [place]       Other Physicians  Dr. Arthur Patel ()  Dr. Richard Perera

## 2024-05-06 ENCOUNTER — PATIENT MESSAGE (OUTPATIENT)
Dept: HEMATOLOGY/ONCOLOGY | Facility: CLINIC | Age: 69
End: 2024-05-06
Payer: MEDICARE

## 2024-05-22 ENCOUNTER — PATIENT MESSAGE (OUTPATIENT)
Dept: HEMATOLOGY/ONCOLOGY | Facility: CLINIC | Age: 69
End: 2024-05-22
Payer: MEDICARE

## 2024-05-28 NOTE — PROGRESS NOTES
Subjective:       Patient ID: Maci Lewis is a 68 y.o. female.    Chief Complaint:  Intermittent abdominal bloating and loose stools    Diagnosis: Recurrent multifocal hepatocellular carcinoma                    Stage IIA right breast cancer 10/13 (T2 N0 M0), 2.4 cm, grade III, ER/[place] neg, Her-2 neg                    Positive BRCA2 mutation                    S/p bilateral mastectomies and BSO     Treatment History  AC x4 (2/14) -->weekly Taxol x12 (6//14) ---> XRT x 14 days ---> bilateral mastectomies  Partial right hepatectomy 7/16  Right hepatic segmentectomy 11/17  Y-90 RHA 6/19/18; Y-90 LHA 8/14/18  Nexavar 8/2-8/16/18  Nivolumab 10/18 - 3/24    Current Treatment: Close observation    Clinical History:  Patient underwent right breast lumpectomy and SLND 10/28/13. Final pathology showed a 2.5 cm grade 3 infiltrating ductal carcinoma with clear margins, no lymphovascular invasion and 6 negative LN's.  She completed adjuvant chemotherapy followed by radiation as outlined above.  Genetic testing was positive for a BRCA2 gene mutation.  She underwent bilateral mastectomies and ROCIO reconstruction 12/8/14    She presented to the hospital 7/24/16 with acute right neck and shoulder pain, followed by abdominal pain and distention.  She was anemic and hypotensive. CT of the abdomen showed 2 complex heterogeneous masses along the inferior aspect of the right hepatic lobe.  The larger more peripheral mass showed evidence of active hemorrhage.  There was a large subcapsular hematoma with free fluid in the pelvis.  Attempted embolization by interventional radiology was unsuccessful.  She was taken to the OR for exploratory laparotomy.  A large intra-abdominal blood collection was evacuated.  There was tumor in hepatic segment 6 with necrosis and rupture with some minimal oozing but no active bleeding.  Partial right hepatectomy was performed.  Intraoperative ultrasound of the liver showed no other suspicious  lesions.  Alpha-fetoprotein level was normal.  Final pathology showed a 5.3 cm intermediate grade hepatocellular carcinoma with focal hemorrhagic infarction, fibrosis and hepatic capsular rupture.  Margins of resection were clear. CT A/P 10/14/16 showed postoperative changes in the lateral inferior margin of the right hepatic lobe and a small fluid collection. There was no abnormal contrast enhancement or evidence of suspicious liver lesions. Repeat scans 2/14/17 showed stable postoperative changes with improved small perihepatic fluid collection.    CT of the abdomen and pelvis 7/24/17 showed postsurgical changes along the inferior right hepatic lobe with a new area of hypoattenuation inferiorly along the suture line measuring up to about 12 mm. This finding was not associated with contrast enhancement to suggest recurrence. Short-term interval follow-up was recommended in 3 months by Radiology. There were small stable hepatic cysts. Follow-up CT scan 10/25/17 showed significant increase of the hypodense lesion adjacent to the suture line in the right hepatic lobe to 4.7 x 4.2 cm. There was minimal low-density change within the lesion suggesting central necrosis and subtle contrast enhancement. Alpha-fetoprotein level was normal. CT scan of the chest 11/3/17 showed no evidence of metastatic disease. There was a small hypodense nodule in the right thyroid. She was referred to surgical oncology and underwent resection of segment 6 of the liver 11/24/17 along with cholecystectomy. Final pathology showed a moderately differentiated hepatocellular carcinoma measuring 6.7 cm in greatest dimension. There was no evidence of vascular or perineural invasion. All resection margins were clear.    Surveillance CT scan of the abdomen and pelvis 5/25/18 showed significant progression and recurrence of her disease with a dominant mass in segment 5 of the right hepatic lobe measuring 6.7 cm and a new mass in the right lobe  measuring 3.7 cm in segment 7 and also in segment 8 measuring 1.6 cm.  A new lesion in the left lobe of the liver measured 4.2 cm.  Alpha-fetoprotein level was normal at 4.5 ng/mL.  CT-guided biopsy 6/5/18 confirmed hepatocellular carcinoma, well-differentiated.     She underwent Y-90 radioembolization to the right hepatic artery to a total dose of 335 mGy 6/19/18.  She was evaluated at [facility] 6/20/18 by Dr. Gigi Perkins for consideration of liver transplant.  Based on her disease burden, she did not meet criteria for liver transplantation.  Recommendations were to attempt to downstage her tumor burden through radioembolization and potentially revisit transplantation in the future depending on her response.  Her case was presented at interdisciplinary conference, and these recommendations were supported, along with addition of Nexavar for systemic treatment as tolerated.  Nexavar was initiated 8/2/18. She underwent Y-90 radioembolization of the left hepatic artery 8/14/18 to a total dose of 256 mGy.  Nexavar was discontinued secondary to significant toxicity including a severe rash, alopecia, anemia and transaminitis.    She was seen at [facility] 10/18/18 by Dr. Christopher for a second treatment opinion. She was not eligible for any current clinical trials at their institution. Her pathology was requested and submitted for next generation sequencing to determine if she had any targetable mutations. We discussed further treatment and both Dr. Christopher and I agreed that her best option for further therapy would be treatment with Nivolumab given the uncharacteristic nature and behavior of her multifocal hepatocellular carcinoma.  Treatment was initiated 10/25/18.    Restaging CT of the abdomen 1/15/19 after 3 months of therapy showed significant disease regression.  The reference left hepatic lobe mass measured 4.8 x 2.0 cm (previously 10.1 x 6.9 cm), mass in segment IV was 4.3 x 2.0 cm (previous 7.6 x 7.3 cm) and a posterior  right hepatic lobe mass was 2.7 x 2.1 cm (previous 5.2 x 4.3 cm). Her transaminases completely normalized and her anemia resolved. CT scans of the chest, abdomen and pelvis 4/4/19 showed continued decrease in size of the multiple hepatic lesions. Representative left hepatic lobe mass measured 3.6 cm (previous 4.8 cm). There were no new lesions. Spleen was unremarkable. There was a 2 mm noncalcified nodule in the left upper lobe. There was no suspicious mediastinal or hilar adenopathy. CT scans of the chest, abdomen and pelvis 4/2/19 showed continued interval decrease of the hepatic metastatic lesions and a 2 mm noncalcified nodule in the left upper lobe with no new sites of disease. Serial CT scans have shown no significant disease progression.     Colonoscopy on 10/11/22 showing patchy, discontinuous erythema with no bleeding.  Pathology showed changes consistent with lymphocytic colitis involving the left and right areas of the colon that were sampled.    She developed increased gas, intermittent diarrhea, and weight loss.  She was also having pruritus and tenderness of the oral mucosa.  Symptoms were felt to be immune mediated side effects from the Nivolumab.  Treatment was held after her infusion on 3/1/24 and she was started on Budesonide.    Interval History  She returns to the office today by herself for a 4 week follow-up visit.  She has been off of nivolumab since 3/1/24.  She was felt to be having possible treatment-related side effects including mucositis, dermatitis and colitis.  She is still having intermittent loose stools and abdominal bloating.  Her blood pressures have been elevated so she decreased her budesonide to two pills daily.  CT of the chest, abdomen and pelvis 5/29/24 showed chronic scarring in the liver with no evidence of recurrent or progressive disease and no evidence of metastatic disease.  There was a large amount of stool diffusely throughout the colon with no acute pathology  "noted.      Review of Systems   Constitutional:  Negative for appetite change, fatigue, fever and unexpected weight change.   HENT:  Negative for mouth sores, sore throat and trouble swallowing.    Eyes: Negative.    Respiratory:  Negative for cough and shortness of breath.    Cardiovascular:  Negative for chest pain, palpitations and leg swelling.   Gastrointestinal:  Positive for constipation and reflux. Negative for abdominal distention, abdominal pain and nausea.   Genitourinary:  Negative for dysuria, frequency and urgency.   Musculoskeletal:  Positive for arthralgias (Mild). Negative for back pain.   Integumentary:  Negative for pallor and rash.   Neurological:  Negative for dizziness, weakness, numbness and headaches.   Hematological:  Negative for adenopathy. Does not bruise/bleed easily.   Psychiatric/Behavioral: Negative.         PMHx:  Arthritis, breast cancer  PSHx:  Tonsils, cholecystectomy, left foot surgery, hysterectomy/BSO, right breast lumpectomy, bilateral mastectomies with reconstruction, HCC resection x2, Y-90 embolization  SH:  Lifetime nonsmoker, occasional social alcohol use. Lives in [place] with her , retired .  FH:  Father, paternal uncle and maternal grandfather had kidney cancer. A great aunt had breast cancer.     Objective:        BP (!) 155/98 Comment: manual BP  Temp 97.6 °F (36.4 °C)   Resp 15   Ht 5' 6" (1.676 m)   Wt 61.2 kg (135 lb)   SpO2 98%   BMI 21.79 kg/m²    Physical Exam  Constitutional:       Comments: Well-developed white female in NAD   HENT:      Head: Normocephalic.      Mouth/Throat:      Mouth: Mucous membranes are moist.      Pharynx: Oropharynx is clear. No posterior oropharyngeal erythema.   Eyes:      General: No scleral icterus.     Extraocular Movements: Extraocular movements intact.      Pupils: Pupils are equal, round, and reactive to light.   Cardiovascular:      Rate and Rhythm: Normal rate and regular rhythm.      Heart " sounds: No murmur heard.  Pulmonary:      Comments: Lungs clear to auscultation  Abdominal:      General: Bowel sounds are normal. There is no distension.      Palpations: Abdomen is soft.      Tenderness: There is no abdominal tenderness.      Comments: Well-healed RUQ incision.  No palpable masses or organomegaly.   Musculoskeletal:         General: No swelling or tenderness. Normal range of motion.      Cervical back: Neck supple. No tenderness.   Skin:     General: Skin is warm and dry.      Findings: No rash.      Comments: Few excoriated areas on chest, back    Neurological:      General: No focal deficit present.      Mental Status: She is oriented to person, place, and time.      Cranial Nerves: No cranial nerve deficit.      Motor: No weakness.       ECOG SCORE    0 - Fully active-able to carry on all pre-disease performance without restriction          LABORATORY   Latest Reference Range & Units 06/03/24 14:34   WBC 4.50 - 11.50 x10(3)/mcL 4.95   RBC 4.20 - 5.40 x10(6)/mcL 4.21   Hemoglobin 12.0 - 16.0 g/dL 13.4   Hematocrit 37.0 - 47.0 % 39.2   MCV 80.0 - 94.0 fL 93.1   MCH 27.0 - 31.0 pg 31.8 (H)   MCHC 33.0 - 36.0 g/dL 34.2   RDW 11.5 - 17.0 % 12.5   Platelet Count 130 - 400 x10(3)/mcL 148   MPV 7.4 - 10.4 fL 8.9   Neut % % 59.8   LYMPH % % 28.7   Mono % % 8.7   Eos % % 2.4       Assessment:   Recurrent multifocal hepatocellular carcinoma - KASSI  Stage IIA triple negative breast cancer 10/13 - KASSI  Positive BRCA2 mutation  Possible immune mediated mucositis and dermatitis - improved  Irritable bowel syndrome with constipation vs. colitis      Plan:   CT images reviewed in the office today in the presence of the patient.  There was no evidence of recurrent or metastatic disease.  She will remain off of immunotherapy with nivolumab.  CT images show chronic constipation, so less likely due to colitis.  She will start a trial of Linzess 145 mcg daily.  Continue budesonide 6 mg daily and decrease to 3 mg  daily if her symptoms improve on Linzess.  If no improvement on Linzess, she will need additional GI evaluation.  Repeat surveillance CT scans of the chest, abdomen and pelvis in 4 months.  RTC after the scans for a follow-up visit and results review.      PHAM WOLF MD    Other Physicians  Dr. Arthur Patel ()  Dr. Anjel Perera

## 2024-06-03 ENCOUNTER — OFFICE VISIT (OUTPATIENT)
Dept: HEMATOLOGY/ONCOLOGY | Facility: CLINIC | Age: 69
End: 2024-06-03
Payer: MEDICARE

## 2024-06-03 ENCOUNTER — LAB VISIT (OUTPATIENT)
Dept: LAB | Facility: HOSPITAL | Age: 69
End: 2024-06-03
Attending: INTERNAL MEDICINE
Payer: MEDICARE

## 2024-06-03 VITALS
DIASTOLIC BLOOD PRESSURE: 98 MMHG | TEMPERATURE: 98 F | WEIGHT: 135 LBS | BODY MASS INDEX: 21.69 KG/M2 | OXYGEN SATURATION: 98 % | RESPIRATION RATE: 15 BRPM | SYSTOLIC BLOOD PRESSURE: 155 MMHG | HEIGHT: 66 IN

## 2024-06-03 DIAGNOSIS — Z15.01 BRCA2 GENE MUTATION POSITIVE: ICD-10-CM

## 2024-06-03 DIAGNOSIS — C22.0 HEPATOCELLULAR CARCINOMA: ICD-10-CM

## 2024-06-03 DIAGNOSIS — K58.1 IRRITABLE BOWEL SYNDROME WITH CONSTIPATION: ICD-10-CM

## 2024-06-03 DIAGNOSIS — E03.2 HYPOTHYROIDISM DUE TO DRUGS: ICD-10-CM

## 2024-06-03 DIAGNOSIS — Z17.0 MALIGNANT NEOPLASM OF CENTRAL PORTION OF RIGHT BREAST IN FEMALE, ESTROGEN RECEPTOR POSITIVE: ICD-10-CM

## 2024-06-03 DIAGNOSIS — C50.111 MALIGNANT NEOPLASM OF CENTRAL PORTION OF RIGHT BREAST IN FEMALE, ESTROGEN RECEPTOR POSITIVE: ICD-10-CM

## 2024-06-03 DIAGNOSIS — Z15.09 BRCA2 GENE MUTATION POSITIVE: ICD-10-CM

## 2024-06-03 DIAGNOSIS — C22.0 HEPATOCELLULAR CARCINOMA: Primary | ICD-10-CM

## 2024-06-03 LAB
ALBUMIN SERPL-MCNC: 3.6 G/DL (ref 3.4–4.8)
ALBUMIN/GLOB SERPL: 1.2 RATIO (ref 1.1–2)
ALP SERPL-CCNC: 89 UNIT/L (ref 40–150)
ALT SERPL-CCNC: 39 UNIT/L (ref 0–55)
ANION GAP SERPL CALC-SCNC: 7 MEQ/L
AST SERPL-CCNC: 21 UNIT/L (ref 5–34)
BASOPHILS # BLD AUTO: 0.02 X10(3)/MCL
BASOPHILS NFR BLD AUTO: 0.4 %
BILIRUB SERPL-MCNC: 0.5 MG/DL
BUN SERPL-MCNC: 16.8 MG/DL (ref 9.8–20.1)
CALCIUM SERPL-MCNC: 8.8 MG/DL (ref 8.4–10.2)
CHLORIDE SERPL-SCNC: 108 MMOL/L (ref 98–107)
CO2 SERPL-SCNC: 26 MMOL/L (ref 23–31)
CREAT SERPL-MCNC: 0.81 MG/DL (ref 0.55–1.02)
CREAT/UREA NIT SERPL: 21
EOSINOPHIL # BLD AUTO: 0.12 X10(3)/MCL (ref 0–0.9)
EOSINOPHIL NFR BLD AUTO: 2.4 %
ERYTHROCYTE [DISTWIDTH] IN BLOOD BY AUTOMATED COUNT: 12.5 % (ref 11.5–17)
GFR SERPLBLD CREATININE-BSD FMLA CKD-EPI: >60 ML/MIN/1.73/M2
GLOBULIN SER-MCNC: 3.1 GM/DL (ref 2.4–3.5)
GLUCOSE SERPL-MCNC: 97 MG/DL (ref 82–115)
HCT VFR BLD AUTO: 39.2 % (ref 37–47)
HGB BLD-MCNC: 13.4 G/DL (ref 12–16)
IMM GRANULOCYTES # BLD AUTO: 0 X10(3)/MCL (ref 0–0.04)
IMM GRANULOCYTES NFR BLD AUTO: 0 %
LYMPHOCYTES # BLD AUTO: 1.42 X10(3)/MCL (ref 0.6–4.6)
LYMPHOCYTES NFR BLD AUTO: 28.7 %
MCH RBC QN AUTO: 31.8 PG (ref 27–31)
MCHC RBC AUTO-ENTMCNC: 34.2 G/DL (ref 33–36)
MCV RBC AUTO: 93.1 FL (ref 80–94)
MONOCYTES # BLD AUTO: 0.43 X10(3)/MCL (ref 0.1–1.3)
MONOCYTES NFR BLD AUTO: 8.7 %
NEUTROPHILS # BLD AUTO: 2.96 X10(3)/MCL (ref 2.1–9.2)
NEUTROPHILS NFR BLD AUTO: 59.8 %
PLATELET # BLD AUTO: 148 X10(3)/MCL (ref 130–400)
PMV BLD AUTO: 8.9 FL (ref 7.4–10.4)
POTASSIUM SERPL-SCNC: 4 MMOL/L (ref 3.5–5.1)
PROT SERPL-MCNC: 6.7 GM/DL (ref 5.8–7.6)
RBC # BLD AUTO: 4.21 X10(6)/MCL (ref 4.2–5.4)
SODIUM SERPL-SCNC: 141 MMOL/L (ref 136–145)
TSH SERPL-ACNC: 2.09 UIU/ML (ref 0.35–4.94)
WBC # SPEC AUTO: 4.95 X10(3)/MCL (ref 4.5–11.5)

## 2024-06-03 PROCEDURE — 99214 OFFICE O/P EST MOD 30 MIN: CPT | Mod: S$PBB,,, | Performed by: INTERNAL MEDICINE

## 2024-06-03 PROCEDURE — 85025 COMPLETE CBC W/AUTO DIFF WBC: CPT

## 2024-06-03 PROCEDURE — 80053 COMPREHEN METABOLIC PANEL: CPT

## 2024-06-03 PROCEDURE — 99214 OFFICE O/P EST MOD 30 MIN: CPT | Mod: PBBFAC | Performed by: INTERNAL MEDICINE

## 2024-06-03 PROCEDURE — 99999 PR PBB SHADOW E&M-EST. PATIENT-LVL IV: CPT | Mod: PBBFAC,,, | Performed by: INTERNAL MEDICINE

## 2024-06-03 PROCEDURE — 84443 ASSAY THYROID STIM HORMONE: CPT

## 2024-06-03 PROCEDURE — 36415 COLL VENOUS BLD VENIPUNCTURE: CPT

## 2024-09-18 ENCOUNTER — PATIENT MESSAGE (OUTPATIENT)
Dept: HEMATOLOGY/ONCOLOGY | Facility: CLINIC | Age: 69
End: 2024-09-18
Payer: MEDICARE

## 2024-09-19 ENCOUNTER — PATIENT MESSAGE (OUTPATIENT)
Dept: HEMATOLOGY/ONCOLOGY | Facility: CLINIC | Age: 69
End: 2024-09-19
Payer: MEDICARE

## 2024-09-29 NOTE — PROGRESS NOTES
Subjective:       Patient ID: Maci Lewis is a 68 y.o. female.    Chief Complaint:  I feel better overall    Diagnosis: Recurrent multifocal hepatocellular carcinoma                    Stage IIA right breast cancer 10/13 (T2 N0 M0), 2.4 cm, grade III, ER/W[place] neg, Her-2 neg                    Positive BRCA2 mutation                    S/p bilateral mastectomies and BSO     Treatment History  AC x4 (2/14) -->weekly Taxol x12 (6//14) ---> XRT x 14 days ---> bilateral mastectomies  Partial right hepatectomy 7/16  Right hepatic segmentectomy 11/17  Y-90 RHA 6/19/18; Y-90 LHA 8/14/18  Nexavar 8/2-8/16/18  Nivolumab 10/18 - 3/24    Current Treatment: Close observation    Clinical History:  Patient underwent right breast lumpectomy and SLND 10/28/13. Final pathology showed a 2.5 cm grade 3 infiltrating ductal carcinoma with clear margins, no lymphovascular invasion and 6 negative LN's.  She completed adjuvant chemotherapy followed by radiation as outlined above.  Genetic testing was positive for a BRCA2 gene mutation.  She underwent bilateral mastectomies and ROCIO reconstruction 12/8/14    She presented to the hospital 7/24/16 with acute right neck and shoulder pain, followed by abdominal pain and distention.  She was anemic and hypotensive. CT of the abdomen showed 2 complex heterogeneous masses along the inferior aspect of the right hepatic lobe.  The larger more peripheral mass showed evidence of active hemorrhage.  There was a large subcapsular hematoma with free fluid in the pelvis.  Attempted embolization by interventional radiology was unsuccessful.  She was taken to the OR for exploratory laparotomy.  A large intra-abdominal blood collection was evacuated.  There was tumor in hepatic segment 6 with necrosis and rupture with some minimal oozing but no active bleeding.  Partial right hepatectomy was performed.  Intraoperative ultrasound of the liver showed no other suspicious lesions.  Alpha-fetoprotein level  was normal.  Final pathology showed a 5.3 cm intermediate grade hepatocellular carcinoma with focal hemorrhagic infarction, fibrosis and hepatic capsular rupture.  Margins of resection were clear. CT A/P 10/14/16 showed postoperative changes in the lateral inferior margin of the right hepatic lobe and a small fluid collection. There was no abnormal contrast enhancement or evidence of suspicious liver lesions. Repeat scans 2/14/17 showed stable postoperative changes with improved small perihepatic fluid collection.    CT of the abdomen and pelvis 7/24/17 showed postsurgical changes along the inferior right hepatic lobe with a new area of hypoattenuation inferiorly along the suture line measuring up to about 12 mm. This finding was not associated with contrast enhancement to suggest recurrence. Short-term interval follow-up was recommended in 3 months by Radiology. There were small stable hepatic cysts. Follow-up CT scan 10/25/17 showed significant increase of the hypodense lesion adjacent to the suture line in the right hepatic lobe to 4.7 x 4.2 cm. There was minimal low-density change within the lesion suggesting central necrosis and subtle contrast enhancement. Alpha-fetoprotein level was normal. CT scan of the chest 11/3/17 showed no evidence of metastatic disease. There was a small hypodense nodule in the right thyroid. She was referred to surgical oncology and underwent resection of segment 6 of the liver 11/24/17 along with cholecystectomy. Final pathology showed a moderately differentiated hepatocellular carcinoma measuring 6.7 cm in greatest dimension. There was no evidence of vascular or perineural invasion. All resection margins were clear.    Surveillance CT scan of the abdomen and pelvis 5/25/18 showed significant progression and recurrence of her disease with a dominant mass in segment 5 of the right hepatic lobe measuring 6.7 cm and a new mass in the right lobe measuring 3.7 cm in segment 7 and also in  segment 8 measuring 1.6 cm.  A new lesion in the left lobe of the liver measured 4.2 cm.  Alpha-fetoprotein level was normal at 4.5 ng/mL.  CT-guided biopsy 6/5/18 confirmed hepatocellular carcinoma, well-differentiated.     She underwent Y-90 radioembolization to the right hepatic artery to a total dose of 335 mGy 6/19/18.  She was evaluated at [facility] 6/20/18 by Dr. Gigi Perkins for consideration of liver transplant.  Based on her disease burden, she did not meet criteria for liver transplantation.  Recommendations were to attempt to downstage her tumor burden through radioembolization and potentially revisit transplantation in the future depending on her response.  Her case was presented at interdisciplinary conference, and these recommendations were supported, along with addition of Nexavar for systemic treatment as tolerated.  Nexavar was initiated 8/2/18. She underwent Y-90 radioembolization of the left hepatic artery 8/14/18 to a total dose of 256 mGy.  Nexavar was discontinued secondary to significant toxicity including a severe rash, alopecia, anemia and transaminitis.    She was seen at [facility] 10/18/18 by Dr. Christopher for a second treatment opinion. She was not eligible for any current clinical trials at their institution. Her pathology was requested and submitted for next generation sequencing to determine if she had any targetable mutations. We discussed further treatment and both Dr. Christopher and I agreed that her best option for further therapy would be treatment with Nivolumab given the uncharacteristic nature and behavior of her multifocal hepatocellular carcinoma.  Treatment was initiated 10/25/18.    Restaging CT of the abdomen 1/15/19 after 3 months of therapy showed significant disease regression.  The reference left hepatic lobe mass measured 4.8 x 2.0 cm (previously 10.1 x 6.9 cm), mass in segment IV was 4.3 x 2.0 cm (previous 7.6 x 7.3 cm) and a posterior right hepatic lobe mass was 2.7 x 2.1 cm  (previous 5.2 x 4.3 cm). Her transaminases completely normalized and her anemia resolved. CT scans of the chest, abdomen and pelvis 4/4/19 showed continued decrease in size of the multiple hepatic lesions. Representative left hepatic lobe mass measured 3.6 cm (previous 4.8 cm). There were no new lesions. Spleen was unremarkable. There was a 2 mm noncalcified nodule in the left upper lobe. There was no suspicious mediastinal or hilar adenopathy. CT scans of the chest, abdomen and pelvis 4/2/19 showed continued interval decrease of the hepatic metastatic lesions and a 2 mm noncalcified nodule in the left upper lobe with no new sites of disease. Serial CT scans have shown no significant disease progression.     Colonoscopy on 10/11/22 showing patchy, discontinuous erythema with no bleeding.  Pathology showed changes consistent with lymphocytic colitis involving the left and right areas of the colon that were sampled.    She developed increased gas, intermittent diarrhea, and weight loss.  She was also having pruritus and tenderness of the oral mucosa.  Symptoms were felt to be immune mediated side effects from the Nivolumab.  Treatment was held after her infusion on 3/1/24 and she was started on Budesonide followed by gradual taper.    CT C/A/P 5/29/24:  Chronic scarring and calcification in the liver with no evidence of recurrent or progressive disease.  Large amount of stool diffusely throughout the colon.  CT C/A/P 9/30/24:  Stable region of partially calcified scarring inferior liver and mild fatty infiltration.  No findings suspicious for disease recurrence.  Improved constipation.    Interval History  She returns to clinic today by herself for a four-month follow-up visit.  She has now been off of maintenance immunotherapy for over 6 months.  Her previous side-effects of mucositis, dermatitis and colitis have improved.  She still has some mild mouth sensitivity.  No difficulty eating, chewing or swallowing.  Her  weight is stable.  She is having regular bowel movements on Linzess, typically on the soft side.  No abdominal pain or cramping.  No melena or hematochezia.  Surveillance CT scans of the chest, abdomen and pelvis 9/30/24 showed chronic calcifications in the liver with no evidence of disease recurrence.      Review of Systems   Constitutional:  Negative for appetite change, fatigue, fever and unexpected weight change.   HENT:  Negative for mouth sores, sore throat and trouble swallowing.    Eyes: Negative.    Respiratory:  Negative for cough and shortness of breath.    Cardiovascular:  Negative for chest pain, palpitations and leg swelling.   Gastrointestinal:  Positive for constipation (improved). Negative for abdominal distention, abdominal pain, diarrhea, nausea and reflux.   Genitourinary:  Negative for dysuria, frequency and urgency.   Musculoskeletal:  Positive for arthralgias (Mild). Negative for back pain.   Integumentary:  Negative for pallor and rash.   Neurological:  Negative for dizziness, weakness, numbness and headaches.   Hematological:  Negative for adenopathy. Does not bruise/bleed easily.   Psychiatric/Behavioral: Negative.         PMHx:  Arthritis, breast cancer  PSHx:  Tonsils, cholecystectomy, left foot surgery, hysterectomy/BSO, right breast lumpectomy, bilateral mastectomies with reconstruction, HCC resection x2, Y-90 embolization  SH:  Lifetime nonsmoker, occasional social alcohol use. Lives in [place] with her , retired .  FH:  Father, paternal uncle and maternal grandfather had kidney cancer. A great aunt had breast cancer.     Objective:        BP (!) 139/91 (BP Location: Right arm, Patient Position: Sitting)   Pulse 64   Temp 97.6 °F (36.4 °C)   Resp 15   Wt 62.1 kg (137 lb)   SpO2 98%   BMI 22.11 kg/m²    Physical Exam  Constitutional:       Comments: Well-developed white female in NAD   HENT:      Head: Normocephalic.      Mouth/Throat:      Mouth: Mucous  membranes are moist.      Pharynx: Oropharynx is clear. No posterior oropharyngeal erythema.   Eyes:      General: No scleral icterus.     Extraocular Movements: Extraocular movements intact.      Pupils: Pupils are equal, round, and reactive to light.   Cardiovascular:      Rate and Rhythm: Normal rate and regular rhythm.      Heart sounds: No murmur heard.  Pulmonary:      Comments: Lungs clear to auscultation  Abdominal:      General: Bowel sounds are normal. There is no distension.      Palpations: Abdomen is soft.      Tenderness: There is no abdominal tenderness.      Comments: Well-healed RUQ incision.  No palpable masses or organomegaly.   Musculoskeletal:         General: No swelling or tenderness. Normal range of motion.      Cervical back: Neck supple. No tenderness.   Skin:     General: Skin is warm and dry.      Findings: No rash.   Neurological:      General: No focal deficit present.      Mental Status: She is oriented to person, place, and time.      Cranial Nerves: No cranial nerve deficit.      Motor: No weakness.       ECOG SCORE    0 - Fully active-able to carry on all pre-disease performance without restriction          LABORATORY  Laboratory from 9/30/24 reviewed    Assessment:   Recurrent multifocal hepatocellular carcinoma - KASSI  Stage IIA triple negative breast cancer 10/13 - KASSI  Positive BRCA2 mutation  Irritable bowel syndrome with constipation      Plan:   Patient continues to do well with no clinical or radiographic findings suspicious for disease recurrence.  CT images from 9/30/24 were reviewed in the office today in the presence of the patient.    Her immune mediated side effects have all improved off of treatment.  Continued close observation is recommended.    She will return in 4 months for a follow-up visit and clinical exam with repeat laboratory and surveillance CT scans of the chest, abdomen and pelvis.  Continue Linzess 145 mcg daily.      PHAM WOLF MD    Other  Physicians  Dr. Arthur Patel ()  Dr. Anjel Perera

## 2024-09-30 ENCOUNTER — HOSPITAL ENCOUNTER (OUTPATIENT)
Dept: RADIOLOGY | Facility: HOSPITAL | Age: 69
Discharge: HOME | End: 2024-09-30
Attending: INTERNAL MEDICINE
Payer: MEDICARE

## 2024-09-30 DIAGNOSIS — C22.0 HEPATOCELLULAR CARCINOMA: ICD-10-CM

## 2024-09-30 PROCEDURE — 71260 CT THORAX DX C+: CPT | Mod: TC

## 2024-09-30 PROCEDURE — 25500020 PHARM REV CODE 255: Performed by: INTERNAL MEDICINE

## 2024-09-30 PROCEDURE — 74177 CT ABD & PELVIS W/CONTRAST: CPT | Mod: TC

## 2024-09-30 RX ORDER — DIATRIZOATE MEGLUMINE AND DIATRIZOATE SODIUM 660; 100 MG/ML; MG/ML
30 SOLUTION ORAL; RECTAL
Status: COMPLETED | OUTPATIENT
Start: 2024-09-30 | End: 2024-09-30

## 2024-09-30 RX ADMIN — DIATRIZOATE MEGLUMINE AND DIATRIZOATE SODIUM 30 ML: 660; 100 LIQUID ORAL; RECTAL at 10:09

## 2024-09-30 RX ADMIN — IOHEXOL 100 ML: 350 INJECTION, SOLUTION INTRAVENOUS at 10:09

## 2024-10-09 ENCOUNTER — OFFICE VISIT (OUTPATIENT)
Dept: HEMATOLOGY/ONCOLOGY | Facility: CLINIC | Age: 69
End: 2024-10-09
Payer: MEDICARE

## 2024-10-09 VITALS
WEIGHT: 137 LBS | SYSTOLIC BLOOD PRESSURE: 139 MMHG | BODY MASS INDEX: 22.11 KG/M2 | DIASTOLIC BLOOD PRESSURE: 91 MMHG | HEART RATE: 64 BPM | RESPIRATION RATE: 15 BRPM | OXYGEN SATURATION: 98 % | TEMPERATURE: 98 F

## 2024-10-09 DIAGNOSIS — C22.0 HEPATOCELLULAR CARCINOMA: Primary | ICD-10-CM

## 2024-10-09 PROCEDURE — 99999 PR PBB SHADOW E&M-EST. PATIENT-LVL III: CPT | Mod: PBBFAC,,, | Performed by: INTERNAL MEDICINE

## 2024-10-09 PROCEDURE — 99213 OFFICE O/P EST LOW 20 MIN: CPT | Mod: PBBFAC | Performed by: INTERNAL MEDICINE

## 2024-10-09 PROCEDURE — 99214 OFFICE O/P EST MOD 30 MIN: CPT | Mod: S$PBB,,, | Performed by: INTERNAL MEDICINE

## 2024-10-27 ENCOUNTER — PATIENT MESSAGE (OUTPATIENT)
Dept: HEMATOLOGY/ONCOLOGY | Facility: CLINIC | Age: 69
End: 2024-10-27
Payer: MEDICARE

## 2025-01-05 ENCOUNTER — PATIENT MESSAGE (OUTPATIENT)
Dept: HEMATOLOGY/ONCOLOGY | Facility: CLINIC | Age: 70
End: 2025-01-05
Payer: MEDICARE

## 2025-01-07 ENCOUNTER — PATIENT MESSAGE (OUTPATIENT)
Dept: INTERNAL MEDICINE | Facility: CLINIC | Age: 70
End: 2025-01-07
Payer: MEDICARE

## 2025-01-07 DIAGNOSIS — M50.30 DDD (DEGENERATIVE DISC DISEASE), CERVICAL: Primary | ICD-10-CM

## 2025-02-06 NOTE — PROGRESS NOTES
Subjective:       Patient ID: Maci Lewis is a 69 y.o. female.    Chief Complaint:  Intermittent gas and bloating    Diagnosis: Recurrent multifocal hepatocellular carcinoma                    Stage IIA right breast cancer 10/13 (T2 N0 M0), 2.4 cm, grade III, ER/[place] neg, Her-2 neg                    Positive BRCA2 mutation                    S/p bilateral mastectomies and BSO     Treatment History  AC x4 (2/14) -->weekly Taxol x12 (6//14) ---> XRT x 14 days ---> bilateral mastectomies  Partial right hepatectomy 7/16  Right hepatic segmentectomy 11/17  Y-90 RHA 6/19/18; Y-90 LHA 8/14/18  Nexavar 8/2-8/16/18  Nivolumab 10/18 - 3/24    Current Treatment: Close observation    Clinical History:  Patient underwent right breast lumpectomy and SLND 10/28/13. Final pathology showed a 2.5 cm grade 3 infiltrating ductal carcinoma with clear margins, no lymphovascular invasion and 6 negative LN's.  She completed adjuvant chemotherapy followed by radiation as outlined above.  Genetic testing was positive for a BRCA2 gene mutation.  She underwent bilateral mastectomies and ROCIO reconstruction 12/8/14    She presented to the hospital 7/24/16 with acute right neck and shoulder pain, followed by abdominal pain and distention.  She was anemic and hypotensive. CT of the abdomen showed 2 complex heterogeneous masses along the inferior aspect of the right hepatic lobe.  The larger more peripheral mass showed evidence of active hemorrhage.  There was a large subcapsular hematoma with free fluid in the pelvis.  Attempted embolization by interventional radiology was unsuccessful.  She was taken to the OR for exploratory laparotomy.  A large intra-abdominal blood collection was evacuated.  There was tumor in hepatic segment 6 with necrosis and rupture with some minimal oozing but no active bleeding.  Partial right hepatectomy was performed.  Intraoperative ultrasound of the liver showed no other suspicious lesions.   Alpha-fetoprotein level was normal.  Final pathology showed a 5.3 cm intermediate grade hepatocellular carcinoma with focal hemorrhagic infarction, fibrosis and hepatic capsular rupture.  Margins of resection were clear. CT A/P 10/14/16 showed postoperative changes in the lateral inferior margin of the right hepatic lobe and a small fluid collection. There was no abnormal contrast enhancement or evidence of suspicious liver lesions. Repeat scans 2/14/17 showed stable postoperative changes with improved small perihepatic fluid collection.    CT of the abdomen and pelvis 7/24/17 showed postsurgical changes along the inferior right hepatic lobe with a new area of hypoattenuation inferiorly along the suture line measuring up to about 12 mm. This finding was not associated with contrast enhancement to suggest recurrence. Short-term interval follow-up was recommended in 3 months by Radiology. There were small stable hepatic cysts. Follow-up CT scan 10/25/17 showed significant increase of the hypodense lesion adjacent to the suture line in the right hepatic lobe to 4.7 x 4.2 cm. There was minimal low-density change within the lesion suggesting central necrosis and subtle contrast enhancement. Alpha-fetoprotein level was normal. CT scan of the chest 11/3/17 showed no evidence of metastatic disease. There was a small hypodense nodule in the right thyroid. She was referred to surgical oncology and underwent resection of segment 6 of the liver 11/24/17 along with cholecystectomy. Final pathology showed a moderately differentiated hepatocellular carcinoma measuring 6.7 cm in greatest dimension. There was no evidence of vascular or perineural invasion. All resection margins were clear.    Surveillance CT scan of the abdomen and pelvis 5/25/18 showed significant progression and recurrence of her disease with a dominant mass in segment 5 of the right hepatic lobe measuring 6.7 cm and a new mass in the right lobe measuring 3.7 cm  in segment 7 and also in segment 8 measuring 1.6 cm.  A new lesion in the left lobe of the liver measured 4.2 cm.  Alpha-fetoprotein level was normal at 4.5 ng/mL.  CT-guided biopsy 6/5/18 confirmed hepatocellular carcinoma, well-differentiated.     She underwent Y-90 radioembolization to the right hepatic artery to a total dose of 335 mGy 6/19/18.  She was evaluated at [facility] 6/20/18 by Dr. Gigi Perkins for consideration of liver transplant.  Based on her disease burden, she did not meet criteria for liver transplantation.  Recommendations were to attempt to downstage her tumor burden through radioembolization and potentially revisit transplantation in the future depending on her response.  Her case was presented at interdisciplinary conference, and these recommendations were supported, along with addition of Nexavar for systemic treatment as tolerated.  Nexavar was initiated 8/2/18. She underwent Y-90 radioembolization of the left hepatic artery 8/14/18 to a total dose of 256 mGy.  Nexavar was discontinued secondary to significant toxicity including a severe rash, alopecia, anemia and transaminitis.    She was seen at [facility] 10/18/18 by Dr. Christopher for a second treatment opinion. She was not eligible for any current clinical trials at their institution. Her pathology was requested and submitted for next generation sequencing to determine if she had any targetable mutations. We discussed further treatment and both Dr. Christopher and I agreed that her best option for further therapy would be treatment with Nivolumab given the uncharacteristic nature and behavior of her multifocal hepatocellular carcinoma.  Treatment was initiated 10/25/18.    Restaging CT of the abdomen 1/15/19 after 3 months of therapy showed significant disease regression.  The reference left hepatic lobe mass measured 4.8 x 2.0 cm (previously 10.1 x 6.9 cm), mass in segment IV was 4.3 x 2.0 cm (previous 7.6 x 7.3 cm) and a posterior right hepatic  lobe mass was 2.7 x 2.1 cm (previous 5.2 x 4.3 cm). Her transaminases completely normalized and her anemia resolved. CT scans of the chest, abdomen and pelvis 4/4/19 showed continued decrease in size of the multiple hepatic lesions. Representative left hepatic lobe mass measured 3.6 cm (previous 4.8 cm). There were no new lesions. Spleen was unremarkable. There was a 2 mm noncalcified nodule in the left upper lobe. There was no suspicious mediastinal or hilar adenopathy. CT scans of the chest, abdomen and pelvis 4/2/19 showed continued interval decrease of the hepatic metastatic lesions and a 2 mm noncalcified nodule in the left upper lobe with no new sites of disease. Serial CT scans have shown no significant disease progression.     Colonoscopy on 10/11/22 showing patchy, discontinuous erythema with no bleeding.  Pathology showed changes consistent with lymphocytic colitis involving the left and right areas of the colon that were sampled.    She developed increased gas, intermittent diarrhea, and weight loss.  She was also having pruritus and tenderness of the oral mucosa.  Symptoms were felt to be immune mediated side effects from the Nivolumab.  Treatment was held after her infusion on 3/1/24 and she was started on Budesonide followed by gradual taper.    CT C/A/P 5/29/24:  Chronic scarring and calcification in the liver with no evidence of recurrent or progressive disease.  Large amount of stool diffusely throughout the colon.  CT C/A/P 9/30/24:  Stable region of partially calcified scarring inferior liver and mild fatty infiltration.  No findings suspicious for disease recurrence.  Improved constipation.  CT C/A/P 2/12/25:  Stable postsurgical changes right hepatic lobe with chronic calcification.  No new or suspicious liver lesions.  No evidence of metastatic disease.      Interval History  She returns to the office today by herself for a four-month follow-up visit.  She has been off of maintenance  "immunotherapy for 11 months.  She has no abdominal pain, anorexia or weight loss.  She has intermittent gas and bloating.  EGD 11/22/24 showed minimal gastritis.  Biopsies of the gastric antrum and duodenum showed no significant inflammation or dysplasia.  No evidence of Helicobacter.  Surveillance CT scans of the chest, abdomen and pelvis 2/12/25 showed stable chronic calcification in the liver with no evidence of disease recurrence.      Review of Systems   Constitutional:  Negative for appetite change, fatigue, fever and unexpected weight change.   HENT:  Negative for mouth sores, sore throat and trouble swallowing.    Eyes: Negative.    Respiratory:  Negative for cough and shortness of breath.    Cardiovascular:  Negative for chest pain, palpitations and leg swelling.   Gastrointestinal:  Positive for constipation (improved on Linzess). Negative for abdominal distention, abdominal pain, diarrhea, nausea and reflux.   Genitourinary:  Negative for dysuria, flank pain and frequency.   Musculoskeletal:  Positive for arthralgias (Mild). Negative for back pain.   Integumentary:  Negative for pallor and rash.   Neurological:  Negative for dizziness, weakness, numbness and headaches.   Hematological:  Negative for adenopathy. Does not bruise/bleed easily.   Psychiatric/Behavioral: Negative.         PMHx:  Arthritis, breast cancer  PSHx:  Tonsils, cholecystectomy, left foot surgery, hysterectomy/BSO, right breast lumpectomy, bilateral mastectomies with reconstruction, HCC resection x2, Y-90 embolization  SH:  Lifetime nonsmoker, occasional social alcohol use. Lives in [place] with her , retired .  FH:  Father, paternal uncle and maternal grandfather had kidney cancer. A great aunt had breast cancer.     Objective:        BP (!) 138/90 (Patient Position: Sitting)   Pulse 60   Temp 97.9 °F (36.6 °C)   Resp 15   Ht 5' 6" (1.676 m)   Wt 64.2 kg (141 lb 9.6 oz)   SpO2 98%   BMI 22.85 kg/m²  "   Physical Exam  Constitutional:       Comments: Well-developed white female in NAD   HENT:      Head: Normocephalic.      Mouth/Throat:      Mouth: Mucous membranes are moist.      Pharynx: Oropharynx is clear. No posterior oropharyngeal erythema.   Eyes:      General: No scleral icterus.     Extraocular Movements: Extraocular movements intact.      Pupils: Pupils are equal, round, and reactive to light.   Cardiovascular:      Rate and Rhythm: Normal rate and regular rhythm.      Heart sounds: No murmur heard.  Pulmonary:      Comments: Lungs clear to auscultation  Abdominal:      General: Bowel sounds are normal. There is no distension.      Palpations: Abdomen is soft.      Tenderness: There is no abdominal tenderness.      Comments: Well-healed RUQ incision.  No palpable masses or organomegaly.   Musculoskeletal:         General: No swelling or tenderness. Normal range of motion.      Cervical back: Neck supple. No tenderness.   Skin:     General: Skin is warm and dry.      Findings: No rash.   Neurological:      General: No focal deficit present.      Mental Status: She is oriented to person, place, and time.      Cranial Nerves: No cranial nerve deficit.      Motor: No weakness.       ECOG SCORE    0 - Fully active-able to carry on all pre-disease performance without restriction          LABORATORY  Laboratory testing from 2/11/25 reviewed and unremarkable.    Assessment:   Recurrent multifocal hepatocellular carcinoma - KASSI  Stage IIA triple negative breast cancer 10/13 - KASSI  Positive BRCA2 mutation  Irritable bowel syndrome with constipation      Plan:   Surveillance CT scans show no evidence of disease recurrence.  CT images were reviewed in the office in the presence of the patient.  She has been off of immunotherapy for almost 1 year.  She will be scheduled for a surveillance CT of the abdomen and pelvis in 6 months.    RTC after the scan for a follow-up visit and clinical assessment.      PHAM DANIELS  MD MARYANN    Other Physicians  Dr. Arthur Patel ()  Dr. Anjel Perera

## 2025-02-11 ENCOUNTER — LAB VISIT (OUTPATIENT)
Dept: LAB | Facility: HOSPITAL | Age: 70
End: 2025-02-11
Attending: INTERNAL MEDICINE
Payer: MEDICARE

## 2025-02-11 DIAGNOSIS — C22.0 HEPATOCELLULAR CARCINOMA: ICD-10-CM

## 2025-02-11 LAB
ALBUMIN SERPL-MCNC: 3.8 G/DL (ref 3.4–4.8)
ALBUMIN/GLOB SERPL: 1.1 RATIO (ref 1.1–2)
ALP SERPL-CCNC: 73 UNIT/L (ref 40–150)
ALT SERPL-CCNC: 30 UNIT/L (ref 0–55)
ANION GAP SERPL CALC-SCNC: 4 MEQ/L
AST SERPL-CCNC: 29 UNIT/L (ref 5–34)
BASOPHILS # BLD AUTO: 0.04 X10(3)/MCL
BASOPHILS NFR BLD AUTO: 0.7 %
BILIRUB SERPL-MCNC: 0.9 MG/DL
BUN SERPL-MCNC: 15.9 MG/DL (ref 9.8–20.1)
CALCIUM SERPL-MCNC: 9.4 MG/DL (ref 8.4–10.2)
CHLORIDE SERPL-SCNC: 108 MMOL/L (ref 98–107)
CO2 SERPL-SCNC: 25 MMOL/L (ref 23–31)
CREAT SERPL-MCNC: 0.98 MG/DL (ref 0.55–1.02)
CREAT/UREA NIT SERPL: 16
EOSINOPHIL # BLD AUTO: 1.5 X10(3)/MCL (ref 0–0.9)
EOSINOPHIL NFR BLD AUTO: 27.6 %
ERYTHROCYTE [DISTWIDTH] IN BLOOD BY AUTOMATED COUNT: 12.5 % (ref 11.5–17)
GFR SERPLBLD CREATININE-BSD FMLA CKD-EPI: >60 ML/MIN/1.73/M2
GLOBULIN SER-MCNC: 3.5 GM/DL (ref 2.4–3.5)
GLUCOSE SERPL-MCNC: 133 MG/DL (ref 82–115)
HCT VFR BLD AUTO: 41.8 % (ref 37–47)
HGB BLD-MCNC: 14.2 G/DL (ref 12–16)
IMM GRANULOCYTES # BLD AUTO: 0 X10(3)/MCL (ref 0–0.04)
IMM GRANULOCYTES NFR BLD AUTO: 0 %
LYMPHOCYTES # BLD AUTO: 1.09 X10(3)/MCL (ref 0.6–4.6)
LYMPHOCYTES NFR BLD AUTO: 20.1 %
MCH RBC QN AUTO: 31.3 PG (ref 27–31)
MCHC RBC AUTO-ENTMCNC: 34 G/DL (ref 33–36)
MCV RBC AUTO: 92.1 FL (ref 80–94)
MONOCYTES # BLD AUTO: 0.39 X10(3)/MCL (ref 0.1–1.3)
MONOCYTES NFR BLD AUTO: 7.2 %
NEUTROPHILS # BLD AUTO: 2.41 X10(3)/MCL (ref 2.1–9.2)
NEUTROPHILS NFR BLD AUTO: 44.4 %
PLATELET # BLD AUTO: 156 X10(3)/MCL (ref 130–400)
PMV BLD AUTO: 8.9 FL (ref 7.4–10.4)
POTASSIUM SERPL-SCNC: 4.2 MMOL/L (ref 3.5–5.1)
PROT SERPL-MCNC: 7.3 GM/DL (ref 5.8–7.6)
RBC # BLD AUTO: 4.54 X10(6)/MCL (ref 4.2–5.4)
SODIUM SERPL-SCNC: 137 MMOL/L (ref 136–145)
WBC # BLD AUTO: 5.43 X10(3)/MCL (ref 4.5–11.5)

## 2025-02-11 PROCEDURE — 80053 COMPREHEN METABOLIC PANEL: CPT

## 2025-02-11 PROCEDURE — 36415 COLL VENOUS BLD VENIPUNCTURE: CPT

## 2025-02-11 PROCEDURE — 85025 COMPLETE CBC W/AUTO DIFF WBC: CPT

## 2025-02-18 ENCOUNTER — OFFICE VISIT (OUTPATIENT)
Dept: HEMATOLOGY/ONCOLOGY | Facility: CLINIC | Age: 70
End: 2025-02-18
Payer: MEDICARE

## 2025-02-18 VITALS
OXYGEN SATURATION: 98 % | RESPIRATION RATE: 15 BRPM | HEIGHT: 66 IN | WEIGHT: 141.63 LBS | SYSTOLIC BLOOD PRESSURE: 138 MMHG | HEART RATE: 60 BPM | BODY MASS INDEX: 22.76 KG/M2 | TEMPERATURE: 98 F | DIASTOLIC BLOOD PRESSURE: 90 MMHG

## 2025-02-18 DIAGNOSIS — C22.0 HEPATOCELLULAR CARCINOMA: Primary | ICD-10-CM

## 2025-02-18 PROCEDURE — 99214 OFFICE O/P EST MOD 30 MIN: CPT | Mod: PBBFAC | Performed by: INTERNAL MEDICINE

## 2025-02-18 RX ORDER — SIMETHICONE 80 MG
80 TABLET,CHEWABLE ORAL EVERY 6 HOURS PRN
COMMUNITY

## 2025-03-10 ENCOUNTER — TELEPHONE (OUTPATIENT)
Dept: INTERNAL MEDICINE | Facility: CLINIC | Age: 70
End: 2025-03-10
Payer: MEDICARE

## 2025-03-10 DIAGNOSIS — R73.01 IFG (IMPAIRED FASTING GLUCOSE): ICD-10-CM

## 2025-03-10 DIAGNOSIS — Z13.220 ENCOUNTER FOR LIPID SCREENING FOR CARDIOVASCULAR DISEASE: ICD-10-CM

## 2025-03-10 DIAGNOSIS — Z13.6 ENCOUNTER FOR LIPID SCREENING FOR CARDIOVASCULAR DISEASE: ICD-10-CM

## 2025-03-10 DIAGNOSIS — C22.0 HEPATOCELLULAR CARCINOMA: Primary | ICD-10-CM

## 2025-03-10 DIAGNOSIS — I70.0 AORTIC ATHEROSCLEROSIS: ICD-10-CM

## 2025-03-10 DIAGNOSIS — E03.2 HYPOTHYROIDISM DUE TO DRUGS: ICD-10-CM

## 2025-03-10 NOTE — TELEPHONE ENCOUNTER
----- Message from Nurse Monahan sent at 3/10/2025 10:13 AM CDT -----  Regarding: janet walters 3/18 @9:40  Are there any outstanding tasks in patient chart? Needs fasting labsIs there documentation of outstanding tasks in patient chart? noHas patient been to the ER, urgent care, or another physician since last visit?Has patient done any blood work or x-rays since last visit?5. PLEASE HAVE PATIENT BRING MEDICATION LIST OR BOTTLES TO EVERY OFFICE VISIT

## 2025-03-13 ENCOUNTER — LAB VISIT (OUTPATIENT)
Dept: LAB | Facility: HOSPITAL | Age: 70
End: 2025-03-13
Attending: INTERNAL MEDICINE
Payer: MEDICARE

## 2025-03-13 DIAGNOSIS — R73.01 IFG (IMPAIRED FASTING GLUCOSE): ICD-10-CM

## 2025-03-13 DIAGNOSIS — E03.2 HYPOTHYROIDISM DUE TO DRUGS: ICD-10-CM

## 2025-03-13 DIAGNOSIS — C22.0 HEPATOCELLULAR CARCINOMA: ICD-10-CM

## 2025-03-13 DIAGNOSIS — Z13.6 ENCOUNTER FOR LIPID SCREENING FOR CARDIOVASCULAR DISEASE: ICD-10-CM

## 2025-03-13 DIAGNOSIS — I70.0 AORTIC ATHEROSCLEROSIS: ICD-10-CM

## 2025-03-13 DIAGNOSIS — Z13.220 ENCOUNTER FOR LIPID SCREENING FOR CARDIOVASCULAR DISEASE: ICD-10-CM

## 2025-03-13 LAB
CHOLEST SERPL-MCNC: 179 MG/DL
CHOLEST/HDLC SERPL: 4 {RATIO} (ref 0–5)
EST. AVERAGE GLUCOSE BLD GHB EST-MCNC: 111.2 MG/DL
HBA1C MFR BLD: 5.5 %
HDLC SERPL-MCNC: 51 MG/DL (ref 35–60)
LDLC SERPL CALC-MCNC: 109 MG/DL (ref 50–140)
T4 FREE SERPL-MCNC: 0.96 NG/DL (ref 0.7–1.48)
TRIGL SERPL-MCNC: 96 MG/DL (ref 37–140)
TSH SERPL-ACNC: 2.48 UIU/ML (ref 0.35–4.94)
VLDLC SERPL CALC-MCNC: 19 MG/DL

## 2025-03-13 PROCEDURE — 36415 COLL VENOUS BLD VENIPUNCTURE: CPT

## 2025-03-13 PROCEDURE — 84443 ASSAY THYROID STIM HORMONE: CPT

## 2025-03-13 PROCEDURE — 84439 ASSAY OF FREE THYROXINE: CPT

## 2025-03-13 PROCEDURE — 80061 LIPID PANEL: CPT

## 2025-03-13 PROCEDURE — 83036 HEMOGLOBIN GLYCOSYLATED A1C: CPT

## 2025-03-18 ENCOUNTER — OFFICE VISIT (OUTPATIENT)
Dept: INTERNAL MEDICINE | Facility: CLINIC | Age: 70
End: 2025-03-18
Payer: MEDICARE

## 2025-03-18 VITALS
WEIGHT: 141.63 LBS | SYSTOLIC BLOOD PRESSURE: 124 MMHG | HEART RATE: 71 BPM | BODY MASS INDEX: 22.76 KG/M2 | OXYGEN SATURATION: 98 % | RESPIRATION RATE: 18 BRPM | DIASTOLIC BLOOD PRESSURE: 82 MMHG | HEIGHT: 66 IN

## 2025-03-18 DIAGNOSIS — C22.0 HEPATOCELLULAR CARCINOMA: Primary | ICD-10-CM

## 2025-03-18 DIAGNOSIS — R19.7 DIARRHEA, UNSPECIFIED TYPE: ICD-10-CM

## 2025-03-18 DIAGNOSIS — C50.111 MALIGNANT NEOPLASM OF CENTRAL PORTION OF RIGHT BREAST IN FEMALE, ESTROGEN RECEPTOR POSITIVE: ICD-10-CM

## 2025-03-18 DIAGNOSIS — Z15.09 BRCA2 GENE MUTATION POSITIVE: ICD-10-CM

## 2025-03-18 DIAGNOSIS — Z00.00 ANNUAL PHYSICAL EXAM: ICD-10-CM

## 2025-03-18 DIAGNOSIS — Z17.0 MALIGNANT NEOPLASM OF CENTRAL PORTION OF RIGHT BREAST IN FEMALE, ESTROGEN RECEPTOR POSITIVE: ICD-10-CM

## 2025-03-18 DIAGNOSIS — Z15.01 BRCA2 GENE MUTATION POSITIVE: ICD-10-CM

## 2025-03-18 DIAGNOSIS — E03.2 HYPOTHYROIDISM DUE TO DRUGS: ICD-10-CM

## 2025-03-18 PROBLEM — K74.60 HEPATIC CIRRHOSIS, UNSPECIFIED HEPATIC CIRRHOSIS TYPE, UNSPECIFIED WHETHER ASCITES PRESENT: Status: RESOLVED | Noted: 2023-03-07 | Resolved: 2025-03-18

## 2025-03-18 PROCEDURE — G0439 PPPS, SUBSEQ VISIT: HCPCS | Mod: ,,, | Performed by: INTERNAL MEDICINE

## 2025-03-18 RX ORDER — PANTOPRAZOLE SODIUM 40 MG/1
40 TABLET, DELAYED RELEASE ORAL EVERY MORNING
COMMUNITY

## 2025-03-18 NOTE — PROGRESS NOTES
Patient ID: Maci Lewis is a 69 y.o. female.    Chief Complaint: Medicare AWV (Labs done 3/13, discuss pantoprazole dosing)      Patient Care Team:  Hussein Patel II, MD as PCP - General (Internal Medicine)     HPI:   Patient presents here today for above reason.     Ms. valentino is a 69-year-old female here for follow-up.  History of breast cancer BRCA mutation also H cc both in remission she is treated with Opdivo in his developed some GI issues did undergo GI surveillance with no findings.  Otherwise doing well labs all within normal limits age-appropriate screening up-to-date here for follow-up.  Is complaining of some refluxing feels like she dumps acid into her stomach has some bloating.  Has been taking Protonix 40 mg.  Otherwise doing well here for follow-up.    The patient's Health Maintenance was reviewed and the following appears to be due at this time:   Health Maintenance Due   Topic Date Due    Shingles Vaccine (1 of 2) Never done    DEXA Scan  03/01/2024    COVID-19 Vaccine (9 - 2024-25 season) 02/14/2025        Past Medical History:  Past Medical History:   Diagnosis Date    Arthritis     BRCA2 gene mutation positive 10/18/2018    Breast cancer     Cancer     Hepatocellular    History of breast cancer 10/18/2018     Past Surgical History:   Procedure Laterality Date    BREAST LUMPECTOMY Right     BREAST RECONSTRUCTION      Reconstruction twice    CHOLECYSTECTOMY      HEEL SPUR SURGERY Left     LIVER RESECTION      2016 and 2017     MASTECTOMY Bilateral     double 2014    OOPHORECTOMY      hysterectomy/ BSO    TONSILLECTOMY  1959    Y-90 embolization       Review of patient's allergies indicates:  No Known Allergies  Medications Ordered Prior to Encounter[1]  Social History[2]  Family History   Problem Relation Name Age of Onset    Heart disease Father      Kidney cancer Father          RCC    Kidney cancer Maternal Grandfather      Kidney cancer Paternal Uncle          RCC    Breast  cancer Maternal Aunt         ROS:   Review of Systems  Constitutional: No weight gain, No fever, No chills, No fatigue.   Eyes: No blurring, No visual disturbances.   Ear/Nose/Mouth/Throat: No decreased hearing, No ear pain, No nasal congestion, No sore throat.   Respiratory: No shortness of breath, No cough, No wheezing.   Cardiovascular: No chest pain, No palpitations, No peripheral edema.   Gastrointestinal: No nausea, No vomiting, No diarrhea, No constipation, No abdominal pain.   Genitourinary: No dysuria, No hematuria.   Hematology/Lymphatics: No bruising tendency, No bleeding tendency, No swollen lymph glands.   Endocrine: No excessive thirst, No polyuria, No excessive hunger.   Musculoskeletal: No joint pain, No muscle pain, No decreased range of motion.   Integumentary: No rash, No pruritus.   Neurologic: No abnormal balance, No confusion, No headache.   Psychiatric: No anxiety, No depression, Not suicidal, No hallucinations.        A comprehensive HEALTH RISK ASSESSMENT was completed today. Results are summarized below:    There are NO EMOTIONAL/SOCIAL CONCERNS identified on today's screening for Social Isolation, Depression and Anxiety.    There are NO COGNITIVE FUNCTION CONCERNS identified on today's screening.  There are NO FUNCTIONAL OR SAFETY CONCERNS were identified on today's screening for Physical Symptoms, Nutritional, Cognitive Function, Home Safety/Living Situation, Fall Risk, Activities of Daily Living, Independent Activities of Daily Living, Physical Activity, Timed Up and Go test and Whisper test.   The patient reports NO OPIOID PRESCRIPTIONS. This was confirmed through medication reconciliation and the [facility] website.    The patient is NOT A TOBACCO USER.  The patient reports NO SIGNIFICANT ALCOHOL USE.     All Questions regarding food, transportation or housing were not answered today.     Vitals/PE:   /82 (BP Location: Left arm, Patient Position: Sitting)   Pulse 71   Resp 18   Ht 5'  "6" (1.676 m)   Wt 64.2 kg (141 lb 9.6 oz)   SpO2 98%   BMI 22.85 kg/m²   Physical Exam    General: Alert and oriented, No acute distress.   Eye: Normal conjunctiva without exudate.  HENMT: Normocephalic/AT, Normal hearing, Oral mucosa is moist and pink   Neck: No goiter visualized.   Respiratory: Lungs CTAB, Respirations are non-labored, Breath sounds are equal, Symmetrical chest wall expansion.  Cardiovascular: Normal rate, Regular rhythm, No murmur, No edema.   Gastrointestinal: Non-distended.   Genitourinary: Deferred.  Musculoskeletal: Normal ROM, Normal gait, No deformities or amputations.  Integumentary: Warm, Dry, Intact. No diaphoresis, or flushing.  Neurologic: No focal deficits, Cranial Nerves II-XII are grossly intact.   Psychiatric: Cooperative, Appropriate mood & affect, Normal judgment, Non-suicidal.             No data to display                  3/18/2025     9:40 AM 10/9/2024    11:20 AM 6/3/2024     3:00 PM 5/3/2024    10:00 AM 4/4/2024     2:00 PM 3/12/2024     3:00 PM 3/12/2024     9:00 AM   Fall Risk Assessment - Outpatient   Mobility Status Ambulatory Ambulatory Ambulatory Ambulatory Ambulatory Ambulatory Ambulatory   Number of falls 0 0 0 0 0 0 0   Identified as fall risk False False False False False False False                Assessment/Plan:       1. Hepatocellular carcinoma    2. BRCA2 gene mutation positive    3. Malignant neoplasm of central portion of right breast in female, estrogen receptor positive    4. 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 Diarrhea, unspecified type    6. Annual physical exam         Plan:   1.  H cc in remission breast cancer in remission as well.    Continue current therapy.    Thyroid levels are within normal range.    She has been on PPIs for some time will place her on Vo[place] as needed  Age-appropriate screening up-to-date continue current therapy.    Education and counseling done face to face regarding medical conditions and plan. Contact office if new " symptoms develop. Should any symptoms ever significantly worsen seek emergency medical attention/go to ER. Follow up at least yearly for wellness or sooner PRN. Nurse to call patient with any results. The patient is receptive, expresses understanding and is agreeable to plan. All questions have been answered.    No follow-ups on file.      Medicare Annual Wellness and Personalized Prevention Plan:   Fall Risk + Home Safety + Hearing Impairment + Depression Screen + Cognitive Impairment Screen + Health Risk Assessment all reviewed.    Advance Care Planning   I attest to discussing Advance Care Planning with patient and/or family member.  Education was provided including the importance of the Health Care Power of , Advance Directives, and/or [place] documentation.  The patient expressed understanding to the importance of this information and discussion.  Length of ACP conversation in minutes:                   [1]   Current Outpatient Medications on File Prior to Visit   Medication Sig Dispense Refill    alpha-D-galactosidase (BEANO ORAL) Take by mouth.      biotin 1 mg tablet Take 5,000 mcg by mouth once daily.       calcium carbonate (OS-MENDEZ) 600 mg calcium (1,500 mg) Tab Take by mouth.      INTRAROSA 6.5 mg Inst Place 1 tablet vaginally once daily.      meloxicam (MOBIC) 15 MG tablet Take 1 tablet (15 mg total) by mouth once daily. As needed 30 tablet 2    multivitamin (THERAGRAN) per tablet Take 1 tablet by mouth once daily.      omega-3 fatty acids fish oil 1,050-1,200 mg Cap capsule Take by mouth.      pantoprazole (PROTONIX) 40 MG tablet Take 40 mg by mouth every morning.      simethicone (MYLICON) 80 MG chewable tablet Take 80 mg by mouth every 6 (six) hours as needed for Flatulence.      tacrolimus (PROGRAF) 1 MG Cap SMARTSI Capsule(s) By Mouth Daily      XIIDRA 5 % Dpet Place 1 drop into both eyes 2 (two) times daily.      [DISCONTINUED] linaCLOtide (LINZESS) 145 mcg Cap capsule Take 1 capsule (145  mcg total) by mouth before breakfast. (Patient not taking: Reported on 3/18/2025) 30 capsule 5     No current facility-administered medications on file prior to visit.   [2]   Social History  Socioeconomic History    Marital status:    Occupational History    Occupation: Retired    Tobacco Use    Smoking status: Never    Smokeless tobacco: Never   Substance and Sexual Activity    Alcohol use: Not Currently     Comment: social drinking 3-5 drinks weekly    Drug use: No     Comment: patient denies    Sexual activity: Yes     Partners: Male     Social Drivers of Health     Financial Resource Strain: Low Risk  (3/17/2025)    Overall Financial Resource Strain (CARDIA)     Difficulty of Paying Living Expenses: Not hard at all   Food Insecurity: No Food Insecurity (3/17/2025)    Hunger Vital Sign     Worried About Running Out of Food in the Last Year: Never true     Ran Out of Food in the Last Year: Never true   Transportation Needs: No Transportation Needs (3/17/2025)    PRAPARE - Transportation     Lack of Transportation (Medical): No     Lack of Transportation (Non-Medical): No   Physical Activity: Sufficiently Active (3/17/2025)    Exercise Vital Sign     Days of Exercise per Week: 5 days     Minutes of Exercise per Session: 60 min   Stress: No Stress Concern Present (3/17/2025)    [employer] [place] [employer] - Occupational Stress Questionnaire     Feeling of Stress : Not at all   Housing Stability: Low Risk  (3/17/2025)    Housing Stability Vital Sign     Unable to Pay for Housing in the Last Year: No     Number of Times Moved in the Last Year: 0     Homeless in the Last Year: No

## 2025-08-07 ENCOUNTER — HOSPITAL ENCOUNTER (OUTPATIENT)
Dept: RADIOLOGY | Facility: HOSPITAL | Age: 70
Discharge: HOME | End: 2025-08-07
Attending: INTERNAL MEDICINE
Payer: MEDICARE

## 2025-08-07 DIAGNOSIS — C22.0 HEPATOCELLULAR CARCINOMA: ICD-10-CM

## 2025-08-07 PROCEDURE — 74177 CT ABD & PELVIS W/CONTRAST: CPT | Mod: TC

## 2025-08-07 PROCEDURE — 25500020 PHARM REV CODE 255: Performed by: INTERNAL MEDICINE

## 2025-08-07 RX ORDER — DIATRIZOATE MEGLUMINE AND DIATRIZOATE SODIUM 660; 100 MG/ML; MG/ML
30 SOLUTION ORAL; RECTAL
Status: COMPLETED | OUTPATIENT
Start: 2025-08-07 | End: 2025-08-07

## 2025-08-07 RX ADMIN — DIATRIZOATE MEGLUMINE AND DIATRIZOATE SODIUM 30 ML: 660; 100 LIQUID ORAL; RECTAL at 08:08

## 2025-08-07 RX ADMIN — IOHEXOL 75 ML: 350 INJECTION, SOLUTION INTRAVENOUS at 08:08

## 2025-08-14 ENCOUNTER — LAB VISIT (OUTPATIENT)
Dept: LAB | Facility: HOSPITAL | Age: 70
End: 2025-08-14
Attending: INTERNAL MEDICINE
Payer: MEDICARE

## 2025-08-14 DIAGNOSIS — C22.0 HEPATOCELLULAR CARCINOMA: ICD-10-CM

## 2025-08-14 LAB
ALBUMIN SERPL-MCNC: 3.8 G/DL (ref 3.4–4.8)
ALBUMIN/GLOB SERPL: 1 RATIO (ref 1.1–2)
ALP SERPL-CCNC: 66 UNIT/L (ref 40–150)
ALT SERPL-CCNC: 27 UNIT/L (ref 0–55)
ANION GAP SERPL CALC-SCNC: 7 MEQ/L
AST SERPL-CCNC: 22 UNIT/L (ref 11–45)
BASOPHILS # BLD AUTO: 0.03 X10(3)/MCL
BASOPHILS NFR BLD AUTO: 0.8 %
BILIRUB SERPL-MCNC: 0.5 MG/DL
BUN SERPL-MCNC: 20 MG/DL (ref 9.8–20.1)
CALCIUM SERPL-MCNC: 9.4 MG/DL (ref 8.4–10.2)
CHLORIDE SERPL-SCNC: 108 MMOL/L (ref 98–107)
CO2 SERPL-SCNC: 24 MMOL/L (ref 23–31)
CREAT SERPL-MCNC: 0.82 MG/DL (ref 0.55–1.02)
CREAT/UREA NIT SERPL: 24
EOSINOPHIL # BLD AUTO: 0.29 X10(3)/MCL (ref 0–0.9)
EOSINOPHIL NFR BLD AUTO: 7.5 %
ERYTHROCYTE [DISTWIDTH] IN BLOOD BY AUTOMATED COUNT: 12.2 % (ref 11.5–17)
GFR SERPLBLD CREATININE-BSD FMLA CKD-EPI: >60 ML/MIN/1.73/M2
GLOBULIN SER-MCNC: 3.7 GM/DL (ref 2.4–3.5)
GLUCOSE SERPL-MCNC: 133 MG/DL (ref 82–115)
HCT VFR BLD AUTO: 41 % (ref 37–47)
HGB BLD-MCNC: 14 G/DL (ref 12–16)
IMM GRANULOCYTES # BLD AUTO: 0 X10(3)/MCL (ref 0–0.04)
IMM GRANULOCYTES NFR BLD AUTO: 0 %
LYMPHOCYTES # BLD AUTO: 1.28 X10(3)/MCL (ref 0.6–4.6)
LYMPHOCYTES NFR BLD AUTO: 33 %
MCH RBC QN AUTO: 30.8 PG (ref 27–31)
MCHC RBC AUTO-ENTMCNC: 34.1 G/DL (ref 33–36)
MCV RBC AUTO: 90.1 FL (ref 80–94)
MONOCYTES # BLD AUTO: 0.33 X10(3)/MCL (ref 0.1–1.3)
MONOCYTES NFR BLD AUTO: 8.5 %
NEUTROPHILS # BLD AUTO: 1.95 X10(3)/MCL (ref 2.1–9.2)
NEUTROPHILS NFR BLD AUTO: 50.2 %
PLATELET # BLD AUTO: 153 X10(3)/MCL (ref 130–400)
PMV BLD AUTO: 8.6 FL (ref 7.4–10.4)
POTASSIUM SERPL-SCNC: 4.6 MMOL/L (ref 3.5–5.1)
PROT SERPL-MCNC: 7.5 GM/DL (ref 5.8–7.6)
RBC # BLD AUTO: 4.55 X10(6)/MCL (ref 4.2–5.4)
SODIUM SERPL-SCNC: 139 MMOL/L (ref 136–145)
WBC # BLD AUTO: 3.88 X10(3)/MCL (ref 4.5–11.5)

## 2025-08-14 PROCEDURE — 85025 COMPLETE CBC W/AUTO DIFF WBC: CPT

## 2025-08-14 PROCEDURE — 80053 COMPREHEN METABOLIC PANEL: CPT

## 2025-08-14 PROCEDURE — 36415 COLL VENOUS BLD VENIPUNCTURE: CPT

## 2025-08-19 ENCOUNTER — OFFICE VISIT (OUTPATIENT)
Dept: HEMATOLOGY/ONCOLOGY | Facility: CLINIC | Age: 70
End: 2025-08-19
Payer: MEDICARE

## 2025-08-19 VITALS
WEIGHT: 143.19 LBS | HEIGHT: 66 IN | HEART RATE: 52 BPM | DIASTOLIC BLOOD PRESSURE: 96 MMHG | OXYGEN SATURATION: 99 % | BODY MASS INDEX: 23.01 KG/M2 | RESPIRATION RATE: 15 BRPM | TEMPERATURE: 98 F | SYSTOLIC BLOOD PRESSURE: 162 MMHG

## 2025-08-19 DIAGNOSIS — C22.0 HEPATOCELLULAR CARCINOMA: Primary | ICD-10-CM

## 2025-08-19 DIAGNOSIS — Z15.09 BRCA2 GENE MUTATION POSITIVE: ICD-10-CM

## 2025-08-19 DIAGNOSIS — Z15.01 BRCA2 GENE MUTATION POSITIVE: ICD-10-CM

## 2025-08-19 DIAGNOSIS — Z85.3 PERSONAL HISTORY OF BREAST CANCER: ICD-10-CM

## 2025-08-19 PROCEDURE — 99999 PR PBB SHADOW E&M-EST. PATIENT-LVL IV: CPT | Mod: PBBFAC,,, | Performed by: INTERNAL MEDICINE

## 2025-08-19 PROCEDURE — 99214 OFFICE O/P EST MOD 30 MIN: CPT | Mod: PBBFAC | Performed by: INTERNAL MEDICINE

## 2025-08-19 PROCEDURE — 99214 OFFICE O/P EST MOD 30 MIN: CPT | Mod: S$PBB,,, | Performed by: INTERNAL MEDICINE
